# Patient Record
Sex: FEMALE | Race: WHITE | NOT HISPANIC OR LATINO
[De-identification: names, ages, dates, MRNs, and addresses within clinical notes are randomized per-mention and may not be internally consistent; named-entity substitution may affect disease eponyms.]

---

## 2017-02-17 ENCOUNTER — PRIOR ORIGINAL RECORDS (OUTPATIENT)
Dept: OTHER | Age: 82
End: 2017-02-17

## 2017-03-07 ENCOUNTER — PRIOR ORIGINAL RECORDS (OUTPATIENT)
Dept: OTHER | Age: 82
End: 2017-03-07

## 2017-03-10 ENCOUNTER — APPOINTMENT (OUTPATIENT)
Dept: LAB | Facility: HOSPITAL | Age: 82
End: 2017-03-10
Attending: INTERNAL MEDICINE
Payer: MEDICARE

## 2017-03-10 ENCOUNTER — PRIOR ORIGINAL RECORDS (OUTPATIENT)
Dept: OTHER | Age: 82
End: 2017-03-10

## 2017-03-10 DIAGNOSIS — I48.91 ATRIAL FIBRILLATION, UNSPECIFIED TYPE (HCC): ICD-10-CM

## 2017-03-10 LAB
ANION GAP SERPL CALC-SCNC: 8 MMOL/L (ref 0–18)
BUN SERPL-MCNC: 7 MG/DL (ref 8–20)
BUN/CREAT SERPL: 7.1 (ref 10–20)
CALCIUM SERPL-MCNC: 9.1 MG/DL (ref 8.5–10.5)
CHLORIDE SERPL-SCNC: 107 MMOL/L (ref 95–110)
CO2 SERPL-SCNC: 28 MMOL/L (ref 22–32)
CREAT SERPL-MCNC: 0.99 MG/DL (ref 0.5–1.5)
GLUCOSE SERPL-MCNC: 107 MG/DL (ref 70–99)
OSMOLALITY UR CALC.SUM OF ELEC: 294 MOSM/KG (ref 275–295)
POTASSIUM SERPL-SCNC: 4.2 MMOL/L (ref 3.3–5.1)
SODIUM SERPL-SCNC: 143 MMOL/L (ref 136–144)

## 2017-03-10 PROCEDURE — 80048 BASIC METABOLIC PNL TOTAL CA: CPT

## 2017-03-10 PROCEDURE — 36415 COLL VENOUS BLD VENIPUNCTURE: CPT

## 2017-03-13 LAB
BUN: 7 MG/DL
CALCIUM: 9.1 MG/DL
CHLORIDE: 107 MEQ/L
CREATININE, SERUM: 0.99 MG/DL
GLUCOSE: 107 MG/DL
POTASSIUM, SERUM: 4.2 MEQ/L
SODIUM: 143 MEQ/L

## 2017-03-17 RX ORDER — SODIUM CHLORIDE 9 MG/ML
INJECTION, SOLUTION INTRAVENOUS ONCE
Status: COMPLETED | OUTPATIENT
Start: 2017-03-20 | End: 2017-03-20

## 2017-03-17 RX ORDER — AMIODARONE HYDROCHLORIDE 200 MG/1
400 TABLET ORAL DAILY
COMMUNITY
End: 2017-07-04

## 2017-03-20 ENCOUNTER — HOSPITAL ENCOUNTER (OUTPATIENT)
Dept: INTERVENTIONAL RADIOLOGY/VASCULAR | Facility: HOSPITAL | Age: 82
Discharge: HOME | End: 2017-03-20
Attending: INTERNAL MEDICINE | Admitting: INTERNAL MEDICINE
Payer: MEDICARE

## 2017-03-20 VITALS
HEIGHT: 68 IN | HEART RATE: 52 BPM | OXYGEN SATURATION: 97 % | SYSTOLIC BLOOD PRESSURE: 147 MMHG | DIASTOLIC BLOOD PRESSURE: 77 MMHG | WEIGHT: 195 LBS | RESPIRATION RATE: 18 BRPM | BODY MASS INDEX: 29.55 KG/M2

## 2017-03-20 DIAGNOSIS — I48.91 A-FIB (HCC): ICD-10-CM

## 2017-03-20 PROCEDURE — 5A2204Z RESTORATION OF CARDIAC RHYTHM, SINGLE: ICD-10-PCS | Performed by: INTERNAL MEDICINE

## 2017-03-20 PROCEDURE — 93005 ELECTROCARDIOGRAM TRACING: CPT

## 2017-03-20 PROCEDURE — 93010 ELECTROCARDIOGRAM REPORT: CPT | Performed by: INTERNAL MEDICINE

## 2017-03-20 PROCEDURE — 92960 CARDIOVERSION ELECTRIC EXT: CPT

## 2017-03-20 RX ADMIN — SODIUM CHLORIDE: 9 INJECTION, SOLUTION INTRAVENOUS at 08:45:00

## 2017-03-20 NOTE — PROCEDURES
PROCEDURE(S) PERFORMED:    1. Cardioversion. 2.     Sedation     :  Marcelino Traylor MD    ANESTHESIA:  IV sedation. Moderate conscious sedation for this procedure was administered and personally monitored from 9:10a until 9:23.      INDICATION:  Pe

## 2017-03-27 ENCOUNTER — OFFICE VISIT (OUTPATIENT)
Dept: CARDIOLOGY CLINIC | Facility: HOSPITAL | Age: 82
End: 2017-03-27
Attending: INTERNAL MEDICINE
Payer: MEDICARE

## 2017-03-27 VITALS
WEIGHT: 193.31 LBS | OXYGEN SATURATION: 97 % | HEART RATE: 54 BPM | RESPIRATION RATE: 16 BRPM | SYSTOLIC BLOOD PRESSURE: 127 MMHG | DIASTOLIC BLOOD PRESSURE: 70 MMHG | BODY MASS INDEX: 29 KG/M2

## 2017-03-27 DIAGNOSIS — I48.19 PERSISTENT ATRIAL FIBRILLATION (HCC): ICD-10-CM

## 2017-03-27 DIAGNOSIS — I10 ESSENTIAL HYPERTENSION: ICD-10-CM

## 2017-03-27 DIAGNOSIS — Z79.899 ON AMIODARONE THERAPY: Chronic | ICD-10-CM

## 2017-03-27 DIAGNOSIS — I48.19 ATRIAL FIBRILLATION, PERSISTENT (HCC): ICD-10-CM

## 2017-03-27 DIAGNOSIS — I48.91 ATRIAL FIBRILLATION (HCC): Primary | ICD-10-CM

## 2017-03-27 PROCEDURE — 99214 OFFICE O/P EST MOD 30 MIN: CPT | Performed by: NURSE PRACTITIONER

## 2017-03-27 PROCEDURE — 93005 ELECTROCARDIOGRAM TRACING: CPT

## 2017-03-27 PROCEDURE — 99211 OFF/OP EST MAY X REQ PHY/QHP: CPT | Performed by: NURSE PRACTITIONER

## 2017-03-27 PROCEDURE — 93010 ELECTROCARDIOGRAM REPORT: CPT | Performed by: NURSE PRACTITIONER

## 2017-03-27 RX ORDER — CARVEDILOL 3.12 MG/1
3.12 TABLET ORAL 2 TIMES DAILY WITH MEALS
Qty: 60 TABLET | Refills: 2 | Status: SHIPPED | OUTPATIENT
Start: 2017-03-27 | End: 2017-09-06

## 2017-03-27 NOTE — PATIENT INSTRUCTIONS
Continue all your same medications except decrease carvedilol to 3.125 mg take 1 tablet twice daily (may cut the 6.25 mg tablet in half)    Call if having any dizziness, lightheadedness, heart racing, palpitations, chest pain, shortness of breath, coughing

## 2017-03-27 NOTE — PROGRESS NOTES
1740 [facility] Patient Status:  Outpatient    1934 MRN [id number]   [facility] Mcgee Gunnar is a 80year old female who presents to clini 143 03/10/2017 10:14 AM   K 4.2 03/10/2017 10:14 AM    03/10/2017 10:14 AM   CO2 28 03/10/2017 10:14 AM   * 03/10/2017 10:14 AM   CA 9.1 03/10/2017 10:14 AM   ALB 3.3* 10/04/2016 05:20 AM   ALKPHO 59 10/04/2016 05:20 AM   BILT 1.1 10/04/2016 0 amiodarone after recurrent Atrial fib on flecainide. There is post DC cardioversion on 3/20/2017 and continued on Xarelto 20 mg daily. EKG today notes sinus bradycardia heart rate 50 with stable QTC.       History of paroxysmal atrial fibrillation cardiov · Heart healthy, decreased refined sugars, and  2000 mg sodium restricted diet and maintain fluids at at about 64 ounces per day.  Common high sodium foods include frozen dinners, soups (not homemade), some cereal, vegetable juice, canned vegetables, janna

## 2017-04-10 ENCOUNTER — OFFICE VISIT (OUTPATIENT)
Dept: CARDIOLOGY CLINIC | Facility: HOSPITAL | Age: 82
End: 2017-04-10
Attending: INTERNAL MEDICINE
Payer: MEDICARE

## 2017-04-10 ENCOUNTER — PRIOR ORIGINAL RECORDS (OUTPATIENT)
Dept: OTHER | Age: 82
End: 2017-04-10

## 2017-04-10 VITALS
SYSTOLIC BLOOD PRESSURE: 117 MMHG | HEART RATE: 60 BPM | BODY MASS INDEX: 29 KG/M2 | RESPIRATION RATE: 16 BRPM | WEIGHT: 192.13 LBS | DIASTOLIC BLOOD PRESSURE: 77 MMHG | OXYGEN SATURATION: 98 %

## 2017-04-10 DIAGNOSIS — I95.1 ORTHOSTASIS: ICD-10-CM

## 2017-04-10 DIAGNOSIS — I10 ESSENTIAL HYPERTENSION: ICD-10-CM

## 2017-04-10 DIAGNOSIS — I48.0 PAROXYSMAL ATRIAL FIBRILLATION (HCC): ICD-10-CM

## 2017-04-10 DIAGNOSIS — I48.91 ATRIAL FIBRILLATION (HCC): Primary | ICD-10-CM

## 2017-04-10 DIAGNOSIS — R42 DIZZINESS: ICD-10-CM

## 2017-04-10 DIAGNOSIS — Z79.899 ON AMIODARONE THERAPY: Chronic | ICD-10-CM

## 2017-04-10 PROCEDURE — 36415 COLL VENOUS BLD VENIPUNCTURE: CPT | Performed by: NURSE PRACTITIONER

## 2017-04-10 PROCEDURE — 99214 OFFICE O/P EST MOD 30 MIN: CPT | Performed by: NURSE PRACTITIONER

## 2017-04-10 PROCEDURE — 93005 ELECTROCARDIOGRAM TRACING: CPT

## 2017-04-10 PROCEDURE — 99212 OFFICE O/P EST SF 10 MIN: CPT | Performed by: NURSE PRACTITIONER

## 2017-04-10 PROCEDURE — 93010 ELECTROCARDIOGRAM REPORT: CPT | Performed by: NURSE PRACTITIONER

## 2017-04-10 PROCEDURE — 84443 ASSAY THYROID STIM HORMONE: CPT | Performed by: NURSE PRACTITIONER

## 2017-04-10 PROCEDURE — 80053 COMPREHEN METABOLIC PANEL: CPT | Performed by: NURSE PRACTITIONER

## 2017-04-10 NOTE — PROGRESS NOTES
1740 [facility] Patient Status:  Outpatient    1934 MRN [id number]   Location MD Lashell Kingsley MD Adolph Blacksmith is a 80year old female who presents to clini HGB 14.1 10/19/2016 08:58 AM   HCT 41.6 10/19/2016 08:58 AM    10/19/2016 08:58 AM   CREATSERUM 0.99 03/10/2017 10:14 AM   BUN 7* 03/10/2017 10:14 AM    03/10/2017 10:14 AM   K 4.2 03/10/2017 10:14 AM    03/10/2017 10:14 AM   CO2 28 03 purpose of clinic visits, sodium restricted diet, low sodium foods, fluid restrictions, daily weights, medication regimen s/s atrial fibrillation exacerbation and when to call APN/clinic. Patient receptive.     Assessment:  Persistent recurrent atrial fibri for total of 3 months then decrease to 200 mg daily if she is remaining in sinus rhythm. Consider stopping carvedilol but reluctant due to history of recurrent A. fib.   Follow-up in the atrial fibrillation clinic on April 24 and follow up with Dr. Lexx Dsouza o

## 2017-04-26 ENCOUNTER — OFFICE VISIT (OUTPATIENT)
Dept: CARDIOLOGY CLINIC | Facility: HOSPITAL | Age: 82
End: 2017-04-26
Attending: INTERNAL MEDICINE
Payer: MEDICARE

## 2017-04-26 VITALS
DIASTOLIC BLOOD PRESSURE: 72 MMHG | BODY MASS INDEX: 30 KG/M2 | RESPIRATION RATE: 16 BRPM | HEART RATE: 59 BPM | SYSTOLIC BLOOD PRESSURE: 144 MMHG | OXYGEN SATURATION: 99 % | WEIGHT: 195.31 LBS

## 2017-04-26 DIAGNOSIS — I48.0 PAROXYSMAL ATRIAL FIBRILLATION (HCC): Primary | ICD-10-CM

## 2017-04-26 DIAGNOSIS — Z79.899 ON AMIODARONE THERAPY: Chronic | ICD-10-CM

## 2017-04-26 DIAGNOSIS — I10 ESSENTIAL HYPERTENSION: ICD-10-CM

## 2017-04-26 DIAGNOSIS — R42 DIZZINESS: ICD-10-CM

## 2017-04-26 PROCEDURE — 99213 OFFICE O/P EST LOW 20 MIN: CPT | Performed by: NURSE PRACTITIONER

## 2017-04-26 PROCEDURE — 99211 OFF/OP EST MAY X REQ PHY/QHP: CPT | Performed by: NURSE PRACTITIONER

## 2017-04-26 NOTE — PROGRESS NOTES
1740 [facility] Patient Status:  Outpatient    1934 MRN [id number]   Location MD Leno Shannon MD Tully Macadam is a 80year old female who presents to clini negative for muscle weakness and myalgias    Objective:    Lab Results  Component Value Date/Time   WBC 4.0 10/19/2016 08:58 AM   HGB 14.1 10/19/2016 08:58 AM   HCT 41.6 10/19/2016 08:58 AM    10/19/2016 08:58 AM   CREATSERUM 0.95 04/10/2017 01:36 P changes    Education:  Patient  instructed at length regarding clinic procedures, hours, purpose of clinic visits, sodium restricted diet, low sodium foods, fluid restrictions, daily weights, medication regimen s/s atrial fibrillation exacerbation and when worsening symptoms. Weigh yourself daily in the morning before breakfast, call if gaining 3 lbs or more overnight or more than 5 lbs in one week.     Follow up with Dr. Odell Houser on May 30 th as scheduled    Follow up with the atrial fibrillation clinic as

## 2017-05-30 ENCOUNTER — LAB ENCOUNTER (OUTPATIENT)
Dept: LAB | Facility: HOSPITAL | Age: 82
End: 2017-05-30
Attending: INTERNAL MEDICINE
Payer: MEDICARE

## 2017-05-30 ENCOUNTER — PRIOR ORIGINAL RECORDS (OUTPATIENT)
Dept: OTHER | Age: 82
End: 2017-05-30

## 2017-05-30 DIAGNOSIS — I48.91 A-FIB (HCC): Primary | ICD-10-CM

## 2017-05-30 LAB
ALBUMIN: 3.6 G/DL
ALKALINE PHOSPHATATE(ALK PHOS): 60 IU/L
ALT (SGPT): 31 U/L
AST (SGOT): 24 U/L
BILIRUBIN TOTAL: 0.9 MG/DL
BUN: 9 MG/DL
CALCIUM: 9.1 MG/DL
CHLORIDE: 106 MEQ/L
CREATININE, SERUM: 0.95 MG/DL
GLOBULIN: 2.8 G/DL
GLUCOSE: 89 MG/DL
GLUCOSE: 89 MG/DL
POTASSIUM, SERUM: 4.1 MEQ/L
PROTEIN, TOTAL: 6.4 G/DL
SGOT (AST): 24 IU/L
SGPT (ALT): 31 IU/L
SODIUM: 141 MEQ/L
THYROID STIMULATING HORMONE: 5.03 MLU/L

## 2017-05-30 PROCEDURE — 80053 COMPREHEN METABOLIC PANEL: CPT

## 2017-05-30 PROCEDURE — 84443 ASSAY THYROID STIM HORMONE: CPT

## 2017-05-30 PROCEDURE — 36415 COLL VENOUS BLD VENIPUNCTURE: CPT

## 2017-05-31 ENCOUNTER — PRIOR ORIGINAL RECORDS (OUTPATIENT)
Dept: OTHER | Age: 82
End: 2017-05-31

## 2017-05-31 LAB
ALBUMIN: 4 G/DL
ALKALINE PHOSPHATATE(ALK PHOS): 65 IU/L
ALT (SGPT): 27 U/L
AST (SGOT): 29 U/L
BILIRUBIN TOTAL: 1.1 MG/DL
BUN: 10 MG/DL
CALCIUM: 9.6 MG/DL
CHLORIDE: 108 MEQ/L
CREATININE, SERUM: 1.16 MG/DL
GLOBULIN: 2.5 G/DL
GLUCOSE: 94 MG/DL
POTASSIUM, SERUM: 4 MEQ/L
PROTEIN, TOTAL: 6.5 G/DL
SGOT (AST): 29 IU/L
SGPT (ALT): 27 IU/L
SODIUM: 144 MEQ/L
THYROID STIMULATING HORMONE: 4.94 MLU/L

## 2017-06-20 ENCOUNTER — OFFICE VISIT (OUTPATIENT)
Dept: INTERNAL MEDICINE CLINIC | Facility: CLINIC | Age: 82
End: 2017-06-20

## 2017-06-20 ENCOUNTER — PRIOR ORIGINAL RECORDS (OUTPATIENT)
Dept: OTHER | Age: 82
End: 2017-06-20

## 2017-06-20 ENCOUNTER — APPOINTMENT (OUTPATIENT)
Dept: LAB | Facility: HOSPITAL | Age: 82
End: 2017-06-20
Attending: INTERNAL MEDICINE
Payer: MEDICARE

## 2017-06-20 VITALS
HEART RATE: 60 BPM | HEIGHT: 66 IN | DIASTOLIC BLOOD PRESSURE: 72 MMHG | WEIGHT: 187 LBS | TEMPERATURE: 98 F | OXYGEN SATURATION: 95 % | BODY MASS INDEX: 30.05 KG/M2 | SYSTOLIC BLOOD PRESSURE: 124 MMHG

## 2017-06-20 DIAGNOSIS — E55.9 VITAMIN D DEFICIENCY: ICD-10-CM

## 2017-06-20 DIAGNOSIS — G47.9 SLEEP DISTURBANCE: ICD-10-CM

## 2017-06-20 DIAGNOSIS — E03.9 HYPOTHYROIDISM, UNSPECIFIED TYPE: ICD-10-CM

## 2017-06-20 DIAGNOSIS — I48.0 PAROXYSMAL ATRIAL FIBRILLATION (HCC): ICD-10-CM

## 2017-06-20 DIAGNOSIS — I10 ESSENTIAL HYPERTENSION: ICD-10-CM

## 2017-06-20 DIAGNOSIS — Z91.81 AT RISK FOR FALLING: ICD-10-CM

## 2017-06-20 DIAGNOSIS — I10 ESSENTIAL HYPERTENSION: Primary | ICD-10-CM

## 2017-06-20 DIAGNOSIS — R53.83 FATIGUE, UNSPECIFIED TYPE: ICD-10-CM

## 2017-06-20 DIAGNOSIS — E78.5 HYPERLIPIDEMIA, UNSPECIFIED HYPERLIPIDEMIA TYPE: ICD-10-CM

## 2017-06-20 PROCEDURE — 36415 COLL VENOUS BLD VENIPUNCTURE: CPT

## 2017-06-20 PROCEDURE — 80053 COMPREHEN METABOLIC PANEL: CPT

## 2017-06-20 PROCEDURE — 84443 ASSAY THYROID STIM HORMONE: CPT

## 2017-06-20 PROCEDURE — 82306 VITAMIN D 25 HYDROXY: CPT

## 2017-06-20 PROCEDURE — G0463 HOSPITAL OUTPT CLINIC VISIT: HCPCS | Performed by: INTERNAL MEDICINE

## 2017-06-20 PROCEDURE — 99215 OFFICE O/P EST HI 40 MIN: CPT | Performed by: INTERNAL MEDICINE

## 2017-06-20 PROCEDURE — 93005 ELECTROCARDIOGRAM TRACING: CPT | Performed by: INTERNAL MEDICINE

## 2017-06-20 PROCEDURE — 84439 ASSAY OF FREE THYROXINE: CPT

## 2017-06-20 PROCEDURE — 81001 URINALYSIS AUTO W/SCOPE: CPT

## 2017-06-20 PROCEDURE — 93010 ELECTROCARDIOGRAM REPORT: CPT | Performed by: INTERNAL MEDICINE

## 2017-06-20 PROCEDURE — 85027 COMPLETE CBC AUTOMATED: CPT

## 2017-06-20 RX ORDER — LEVOTHYROXINE SODIUM 0.03 MG/1
25 TABLET ORAL
Qty: 30 TABLET | Refills: 2 | Status: SHIPPED | OUTPATIENT
Start: 2017-06-20 | End: 2017-09-05

## 2017-06-24 NOTE — PATIENT INSTRUCTIONS
Healthy Diet and Regular Exercise  [employer] [facility] [address] for making healthy food choices  -   Enjoy your food, but eat less. Fully enjoy your food when eating. Don’t eat while distracted and slow down. Avoid over sized portions.    Do a night light between the bedroom and bathroom. ? Get up slowly from lying down or sitting if you get dizzy. ? Keep a working flashlight near your bed. STAIRS:  ? Keep stairwells well lit with light switches at top and bottom. ?  Install sturdy handrail

## 2017-06-24 NOTE — PROGRESS NOTES
HPI:    Patient ID: Jessenia Carballo is a 80year old female.     HPI    Presents with worsening fatigue  Weakness  And lightleaded  Lives alone   Mostly rest during the day cannot garden or do her usual  Chores    /72   Pulse 60   Temp 98.1 °F (36.7 ° months then decrease to 200 mg daily if she is remaining in sinus rhythm. Follow-up with Dr. Ashok Banks on May 30 as scheduled.   Up with the atrial fibrillation clinic as needed or as directed     Plan:  Continue all your same medications     Call if having an Tartrate (AMBIEN) 5 MG Oral Tab Take 1 tablet (5 mg total) by mouth nightly as needed for Sleep. Disp: 60 tablet Rfl: 0   Rosuvastatin Calcium (CRESTOR) 20 MG Oral Tab Take 1 tablet (20 mg total) by mouth once daily.  Dispense generic only Disp: 90 tablet R heard.  Pulmonary/Chest: Effort normal and breath sounds normal. No respiratory distress. She has no wheezes. She has no rales. She exhibits no tenderness. Abdominal: She exhibits no distension (obese) and no mass. There is no tenderness.    Musculoskelet Increase acativit B{ monitor  Supplement and side effect of med  Fall precaution  Patient voiced understanding  and agrees with plan  .           Orders Placed This Encounter      Assay, Thyroid Stim Hormone      Free T4, (Free Thyroxine)      CBC, Platelet

## 2017-09-06 RX ORDER — LEVOTHYROXINE SODIUM 0.03 MG/1
TABLET ORAL
Qty: 90 TABLET | Refills: 3 | Status: SHIPPED | OUTPATIENT
Start: 2017-09-06 | End: 2018-10-13

## 2017-09-06 RX ORDER — CARVEDILOL 3.12 MG/1
1.56 TABLET ORAL 2 TIMES DAILY WITH MEALS
Qty: 90 TABLET | Refills: 0 | Status: SHIPPED | OUTPATIENT
Start: 2017-09-06 | End: 2019-02-07

## 2017-09-18 LAB
ALBUMIN: 4 G/DL
BILIRUBIN TOTAL: 1 MG/DL
BUN: 14 MG/DL
CALCIUM: 9.2 MG/DL
CHLORIDE: 105 MEQ/L
CREATININE, SERUM: 0.99 MG/DL
FREE T4: 1.13 MG/DL
GLOBULIN: 2.4 G/DL
GLUCOSE: 91 MG/DL
HEMATOCRIT: 41.3 %
HEMOGLOBIN: 13.8 G/DL
MCH: 33.1 PG
MCHC: 33.4 G/DL
MCV: 98.9 FL
PLATELETS: 181 K/UL
POTASSIUM, SERUM: 4.7 MEQ/L
PROTEIN, TOTAL: 6.4 G/DL
RED BLOOD COUNT: 4.17 X 10-6/U
SGOT (AST): 27 IU/L
SGPT (ALT): 30 IU/L
SODIUM: 140 MEQ/L
VITAMIN D 25-OH: 29.3 NG/ML
WHITE BLOOD COUNT: 4.9 X 10-3/U

## 2017-09-19 ENCOUNTER — PRIOR ORIGINAL RECORDS (OUTPATIENT)
Dept: OTHER | Age: 82
End: 2017-09-19

## 2017-09-22 ENCOUNTER — OFFICE VISIT (OUTPATIENT)
Dept: INTERNAL MEDICINE CLINIC | Facility: CLINIC | Age: 82
End: 2017-09-22

## 2017-09-22 VITALS
HEIGHT: 67 IN | TEMPERATURE: 97 F | BODY MASS INDEX: 28.56 KG/M2 | SYSTOLIC BLOOD PRESSURE: 109 MMHG | HEART RATE: 61 BPM | DIASTOLIC BLOOD PRESSURE: 65 MMHG | WEIGHT: 182 LBS

## 2017-09-22 DIAGNOSIS — I10 ESSENTIAL HYPERTENSION: ICD-10-CM

## 2017-09-22 DIAGNOSIS — R53.83 FATIGUE, UNSPECIFIED TYPE: ICD-10-CM

## 2017-09-22 DIAGNOSIS — G47.10 HYPERSOMNIA: ICD-10-CM

## 2017-09-22 DIAGNOSIS — R42 DIZZINESS: ICD-10-CM

## 2017-09-22 DIAGNOSIS — R29.818 SUSPECTED SLEEP APNEA: ICD-10-CM

## 2017-09-22 DIAGNOSIS — I48.20 CHRONIC ATRIAL FIBRILLATION (HCC): ICD-10-CM

## 2017-09-22 DIAGNOSIS — I48.0 PAROXYSMAL ATRIAL FIBRILLATION (HCC): Primary | ICD-10-CM

## 2017-09-22 DIAGNOSIS — E03.9 HYPOTHYROIDISM, UNSPECIFIED TYPE: ICD-10-CM

## 2017-09-22 DIAGNOSIS — E55.9 VITAMIN D DEFICIENCY: ICD-10-CM

## 2017-09-22 PROCEDURE — 93005 ELECTROCARDIOGRAM TRACING: CPT | Performed by: INTERNAL MEDICINE

## 2017-09-22 PROCEDURE — 93010 ELECTROCARDIOGRAM REPORT: CPT | Performed by: INTERNAL MEDICINE

## 2017-09-22 PROCEDURE — G0463 HOSPITAL OUTPT CLINIC VISIT: HCPCS | Performed by: INTERNAL MEDICINE

## 2017-09-22 PROCEDURE — 99214 OFFICE O/P EST MOD 30 MIN: CPT | Performed by: INTERNAL MEDICINE

## 2017-09-22 NOTE — TELEPHONE ENCOUNTER
Vit D refill request     Ref Range & Units 6/20/17  6:13 PM   Vitamin D, 25OH, Total ng/mL 29.3      Refill Protocol Appointment Criteria  · Appointment scheduled in the past 6 months or in the next 3 months  Recent Outpatient Visits            3 months ag

## 2017-09-25 NOTE — PROGRESS NOTES
HPI:    Patient ID: Forest Elizondo is a 80year old female.     HPI    Came in bec she is feeling very fatigued  Saw [facility]  Was to follow up in a few months    /65 (BP Location: Left arm, Patient Position: Sitting)   Pulse 61   Temp (!) 97.4 °F Take 2 tablets (400 mg total) by mouth daily. Disp: 180 tablet Rfl: 3   Rivaroxaban (XARELTO) 20 MG Oral Tab Take 1 tablet (20 mg total) by mouth daily.  Disp: 30 tablet Rfl: 5   Zolpidem Tartrate (AMBIEN) 5 MG Oral Tab Take 1 tablet (5 mg total) by mouth n distal pulses. Bradycardia present. Exam reveals no gallop. No murmur heard. Pulmonary/Chest: Effort normal and breath sounds normal. No respiratory distress. She has no wheezes. She has no rales. She exhibits no tenderness. Abdominal: Soft.  Bowel [id number]

## 2017-09-26 ENCOUNTER — APPOINTMENT (OUTPATIENT)
Dept: LAB | Facility: HOSPITAL | Age: 82
End: 2017-09-26
Attending: INTERNAL MEDICINE
Payer: MEDICARE

## 2017-09-26 ENCOUNTER — PRIOR ORIGINAL RECORDS (OUTPATIENT)
Dept: OTHER | Age: 82
End: 2017-09-26

## 2017-09-26 DIAGNOSIS — E55.9 VITAMIN D DEFICIENCY: ICD-10-CM

## 2017-09-26 DIAGNOSIS — R42 DIZZINESS: ICD-10-CM

## 2017-09-26 DIAGNOSIS — I10 ESSENTIAL HYPERTENSION: ICD-10-CM

## 2017-09-26 DIAGNOSIS — E03.9 HYPOTHYROIDISM, UNSPECIFIED TYPE: ICD-10-CM

## 2017-09-26 LAB
ALBUMIN SERPL BCP-MCNC: 3.7 G/DL (ref 3.5–4.8)
ALBUMIN/GLOB SERPL: 1.6 {RATIO} (ref 1–2)
ALP SERPL-CCNC: 56 U/L (ref 32–100)
ALT SERPL-CCNC: 37 U/L (ref 14–54)
ANION GAP SERPL CALC-SCNC: 7 MMOL/L (ref 0–18)
AST SERPL-CCNC: 34 U/L (ref 15–41)
BACTERIA UR QL AUTO: NEGATIVE /HPF
BILIRUB SERPL-MCNC: 1.1 MG/DL (ref 0.3–1.2)
BUN SERPL-MCNC: 9 MG/DL (ref 8–20)
BUN/CREAT SERPL: 8.2 (ref 10–20)
CALCIUM SERPL-MCNC: 8.8 MG/DL (ref 8.5–10.5)
CHLORIDE SERPL-SCNC: 107 MMOL/L (ref 95–110)
CO2 SERPL-SCNC: 26 MMOL/L (ref 22–32)
CREAT SERPL-MCNC: 1.1 MG/DL (ref 0.5–1.5)
ERYTHROCYTE [DISTWIDTH] IN BLOOD BY AUTOMATED COUNT: 14.3 % (ref 11–15)
FOLATE SERPL-MCNC: 9.2 NG/ML
GLOBULIN PLAS-MCNC: 2.3 G/DL (ref 2.5–3.7)
GLUCOSE SERPL-MCNC: 161 MG/DL (ref 70–99)
HCT VFR BLD AUTO: 40.1 % (ref 35–48)
HGB BLD-MCNC: 13.6 G/DL (ref 12–16)
MCH RBC QN AUTO: 33.8 PG (ref 27–32)
MCHC RBC AUTO-ENTMCNC: 34 G/DL (ref 32–37)
MCV RBC AUTO: 99.4 FL (ref 80–100)
OSMOLALITY UR CALC.SUM OF ELEC: 292 MOSM/KG (ref 275–295)
PLATELET # BLD AUTO: 154 K/UL (ref 140–400)
PMV BLD AUTO: 9.2 FL (ref 7.4–10.3)
POTASSIUM SERPL-SCNC: 3.7 MMOL/L (ref 3.3–5.1)
PROT SERPL-MCNC: 6 G/DL (ref 5.9–8.4)
RBC # BLD AUTO: 4.04 M/UL (ref 3.7–5.4)
RBC #/AREA URNS AUTO: 2 /HPF
SODIUM SERPL-SCNC: 140 MMOL/L (ref 136–144)
T4 FREE SERPL-MCNC: 1.39 NG/DL (ref 0.58–1.64)
TSH SERPL-ACNC: 3.3 UIU/ML (ref 0.45–5.33)
VIT B12 SERPL-MCNC: 668 PG/ML (ref 181–914)
WBC # BLD AUTO: 3.6 K/UL (ref 4–11)
WBC #/AREA URNS AUTO: 1 /HPF

## 2017-09-26 PROCEDURE — 82607 VITAMIN B-12: CPT

## 2017-09-26 PROCEDURE — 81015 MICROSCOPIC EXAM OF URINE: CPT

## 2017-09-26 PROCEDURE — 85027 COMPLETE CBC AUTOMATED: CPT

## 2017-09-26 PROCEDURE — 36415 COLL VENOUS BLD VENIPUNCTURE: CPT

## 2017-09-26 PROCEDURE — 84439 ASSAY OF FREE THYROXINE: CPT

## 2017-09-26 PROCEDURE — 82746 ASSAY OF FOLIC ACID SERUM: CPT

## 2017-09-26 PROCEDURE — 84443 ASSAY THYROID STIM HORMONE: CPT

## 2017-09-26 PROCEDURE — 82306 VITAMIN D 25 HYDROXY: CPT

## 2017-09-26 PROCEDURE — 80053 COMPREHEN METABOLIC PANEL: CPT

## 2017-09-27 LAB — 25(OH)D3 SERPL-MCNC: 38.5 NG/ML

## 2017-09-28 ENCOUNTER — TELEPHONE (OUTPATIENT)
Dept: INTERNAL MEDICINE CLINIC | Facility: CLINIC | Age: 82
End: 2017-09-28

## 2017-09-28 RX ORDER — ERGOCALCIFEROL 1.25 MG/1
CAPSULE ORAL
Qty: 3 CAPSULE | Refills: 0 | OUTPATIENT
Start: 2017-09-28

## 2017-09-28 RX ORDER — ERGOCALCIFEROL 1.25 MG/1
50000 CAPSULE ORAL
Qty: 6 CAPSULE | Refills: 0 | Status: SHIPPED | OUTPATIENT
Start: 2017-09-28 | End: 2018-02-15

## 2017-09-28 NOTE — TELEPHONE ENCOUNTER
Per pt spoke to pt this morning. Pt states Dr. Gloria Velázquez advise her to make appt to see her. Appt made for Tues. 10/3/17   Informed pt Dr. Gloria Velázquez will like her to make appt for sleep study. Order place. Phone number given. Verbalized understanding.   Pt stat

## 2017-09-28 NOTE — TELEPHONE ENCOUNTER
Call patient   I called her and told her that  I ordered a sleep study  She did not have the order and wanted phone number and more information re sleep study  Can you call her and notify her of the order  And give her he information on how to make an appt

## 2017-09-29 ENCOUNTER — PRIOR ORIGINAL RECORDS (OUTPATIENT)
Dept: OTHER | Age: 82
End: 2017-09-29

## 2017-09-29 RX ORDER — ZOLPIDEM TARTRATE 5 MG/1
TABLET ORAL
Qty: 60 TABLET | Refills: 0 | OUTPATIENT
Start: 2017-09-29 | End: 2017-11-30

## 2017-10-03 ENCOUNTER — OFFICE VISIT (OUTPATIENT)
Dept: INTERNAL MEDICINE CLINIC | Facility: CLINIC | Age: 82
End: 2017-10-03

## 2017-10-03 VITALS
HEIGHT: 68 IN | BODY MASS INDEX: 27.43 KG/M2 | DIASTOLIC BLOOD PRESSURE: 75 MMHG | HEART RATE: 57 BPM | SYSTOLIC BLOOD PRESSURE: 130 MMHG | TEMPERATURE: 98 F | WEIGHT: 181 LBS

## 2017-10-03 DIAGNOSIS — E03.9 HYPOTHYROIDISM, UNSPECIFIED TYPE: ICD-10-CM

## 2017-10-03 DIAGNOSIS — E55.9 VITAMIN D DEFICIENCY: ICD-10-CM

## 2017-10-03 DIAGNOSIS — I25.10 CORONARY ARTERY DISEASE INVOLVING NATIVE CORONARY ARTERY OF NATIVE HEART WITHOUT ANGINA PECTORIS: ICD-10-CM

## 2017-10-03 DIAGNOSIS — R42 DIZZINESS: ICD-10-CM

## 2017-10-03 DIAGNOSIS — I48.0 PAROXYSMAL ATRIAL FIBRILLATION (HCC): Primary | ICD-10-CM

## 2017-10-03 PROCEDURE — G0463 HOSPITAL OUTPT CLINIC VISIT: HCPCS | Performed by: INTERNAL MEDICINE

## 2017-10-03 PROCEDURE — 99214 OFFICE O/P EST MOD 30 MIN: CPT | Performed by: INTERNAL MEDICINE

## 2017-10-03 PROCEDURE — G0008 ADMIN INFLUENZA VIRUS VAC: HCPCS | Performed by: INTERNAL MEDICINE

## 2017-10-03 PROCEDURE — G0009 ADMIN PNEUMOCOCCAL VACCINE: HCPCS | Performed by: INTERNAL MEDICINE

## 2017-10-03 PROCEDURE — 90670 PCV13 VACCINE IM: CPT | Performed by: INTERNAL MEDICINE

## 2017-10-03 PROCEDURE — 90653 IIV ADJUVANT VACCINE IM: CPT | Performed by: INTERNAL MEDICINE

## 2017-10-18 NOTE — PROGRESS NOTES
HPI:    Patient ID: Ofelia Bonner is a 80year old female.     HPI    Feeling dizzy weak and fatigue  Hx of paroxysmal atrial fib  Switched form tambocor to amiodorone  Pt with bradycardia stable  Was recently seen by Dr Octavio Ricardo    Pt unsure if she sno ergocalciferol 92780 units Oral Cap Take 1 capsule (50,000 Units total) by mouth every 14 (fourteen) days.  Disp: 6 capsule Rfl: 0   LEVOTHYROXINE SODIUM 25 MCG Oral Tab TAKE 1 TABLET(25 MCG) BY MOUTH BEFORE BREAKFAST Disp: 90 tablet Rfl: 3   carvedilol 3 moist. No oropharyngeal exudate. Eyes: Conjunctivae and EOM are normal. Pupils are equal, round, and reactive to light. Right eye exhibits no discharge. Left eye exhibits no discharge. No scleral icterus. Neck: Neck supple. No thyromegaly present.    Ca

## 2017-10-25 ENCOUNTER — MED REC SCAN ONLY (OUTPATIENT)
Dept: INTERNAL MEDICINE CLINIC | Facility: CLINIC | Age: 82
End: 2017-10-25

## 2017-11-18 RX ORDER — ROSUVASTATIN CALCIUM 20 MG/1
TABLET, COATED ORAL
Qty: 90 TABLET | Refills: 3 | Status: SHIPPED | OUTPATIENT
Start: 2017-11-18 | End: 2018-12-06

## 2017-11-18 RX ORDER — RIVAROXABAN 20 MG/1
TABLET, FILM COATED ORAL
Qty: 90 TABLET | Refills: 3 | Status: SHIPPED | OUTPATIENT
Start: 2017-11-18 | End: 2018-12-06

## 2017-12-02 RX ORDER — ZOLPIDEM TARTRATE 5 MG/1
TABLET ORAL
Qty: 60 TABLET | Refills: 0 | OUTPATIENT
Start: 2017-12-02 | End: 2017-12-09

## 2017-12-10 RX ORDER — ZOLPIDEM TARTRATE 5 MG/1
TABLET ORAL
Qty: 60 TABLET | Refills: 1 | OUTPATIENT
Start: 2017-12-10 | End: 2018-02-15

## 2017-12-17 ENCOUNTER — TELEPHONE (OUTPATIENT)
Dept: INTERNAL MEDICINE CLINIC | Facility: CLINIC | Age: 82
End: 2017-12-17

## 2018-01-04 ENCOUNTER — TELEPHONE (OUTPATIENT)
Dept: INTERNAL MEDICINE CLINIC | Facility: CLINIC | Age: 83
End: 2018-01-04

## 2018-02-16 RX ORDER — ERGOCALCIFEROL 1.25 MG/1
CAPSULE ORAL
Qty: 6 CAPSULE | Refills: 0 | Status: SHIPPED | OUTPATIENT
Start: 2018-02-16 | End: 2018-07-27

## 2018-02-16 RX ORDER — ZOLPIDEM TARTRATE 5 MG/1
TABLET ORAL
Qty: 60 TABLET | Refills: 0 | OUTPATIENT
Start: 2018-02-16

## 2018-03-12 LAB
ALBUMIN: 3.7 G/DL
ALT (SGPT): 37 U/L
AST (SGOT): 34 U/L
BILIRUBIN TOTAL: 1.1 MG/DL
BUN: 9 MG/DL
CALCIUM: 8.8 MG/DL
CHLORIDE: 107 MEQ/L
CREATININE, SERUM: 1.1 MG/DL
FREE T4: 1.39 MG/DL
GLOBULIN: 2.3 G/DL
GLUCOSE: 161 MG/DL
GLUCOSE: 161 MG/DL
HEMATOCRIT: 40.1 %
HEMOGLOBIN: 13.6 G/DL
PLATELETS: 154 K/UL
POTASSIUM, SERUM: 3.7 MEQ/L
PROTEIN, TOTAL: 6 G/DL
RED BLOOD COUNT: 4.04 X 10-6/U
SGOT (AST): 34 IU/L
SGPT (ALT): 37 IU/L
SODIUM: 140 MEQ/L
THYROID STIMULATING HORMONE: 3.3 MLU/L
VITAMIN D 25-OH: 38.5 NG/ML
WHITE BLOOD COUNT: 3.6 X 10-3/U

## 2018-03-16 ENCOUNTER — PRIOR ORIGINAL RECORDS (OUTPATIENT)
Dept: OTHER | Age: 83
End: 2018-03-16

## 2018-03-16 ENCOUNTER — LAB ENCOUNTER (OUTPATIENT)
Dept: LAB | Facility: HOSPITAL | Age: 83
End: 2018-03-16
Attending: INTERNAL MEDICINE
Payer: MEDICARE

## 2018-03-16 DIAGNOSIS — I48.91 A-FIB (HCC): ICD-10-CM

## 2018-03-16 DIAGNOSIS — Z79.899 NEED FOR PROPHYLACTIC CHEMOTHERAPY: Primary | ICD-10-CM

## 2018-03-16 LAB
ALBUMIN SERPL BCP-MCNC: 3.8 G/DL (ref 3.5–4.8)
ALBUMIN/GLOB SERPL: 1.4 {RATIO} (ref 1–2)
ALP SERPL-CCNC: 66 U/L (ref 32–100)
ALT SERPL-CCNC: 36 U/L (ref 14–54)
ANION GAP SERPL CALC-SCNC: 6 MMOL/L (ref 0–18)
AST SERPL-CCNC: 32 U/L (ref 15–41)
BILIRUB SERPL-MCNC: 1 MG/DL (ref 0.3–1.2)
BUN SERPL-MCNC: 9 MG/DL (ref 8–20)
BUN/CREAT SERPL: 9.1 (ref 10–20)
CALCIUM SERPL-MCNC: 9.2 MG/DL (ref 8.5–10.5)
CHLORIDE SERPL-SCNC: 105 MMOL/L (ref 95–110)
CO2 SERPL-SCNC: 29 MMOL/L (ref 22–32)
CREAT SERPL-MCNC: 0.99 MG/DL (ref 0.5–1.5)
GLOBULIN PLAS-MCNC: 2.7 G/DL (ref 2.5–3.7)
GLUCOSE SERPL-MCNC: 116 MG/DL (ref 70–99)
OSMOLALITY UR CALC.SUM OF ELEC: 290 MOSM/KG (ref 275–295)
PATIENT FASTING: NO
POTASSIUM SERPL-SCNC: 3.7 MMOL/L (ref 3.3–5.1)
PROT SERPL-MCNC: 6.5 G/DL (ref 5.9–8.4)
SODIUM SERPL-SCNC: 140 MMOL/L (ref 136–144)
TSH SERPL-ACNC: 3.66 UIU/ML (ref 0.45–5.33)

## 2018-03-16 PROCEDURE — 80053 COMPREHEN METABOLIC PANEL: CPT

## 2018-03-16 PROCEDURE — 36415 COLL VENOUS BLD VENIPUNCTURE: CPT

## 2018-03-16 PROCEDURE — 84443 ASSAY THYROID STIM HORMONE: CPT

## 2018-03-19 LAB
ALBUMIN: 3.8 G/DL
ALKALINE PHOSPHATATE(ALK PHOS): 66 IU/L
ALT (SGPT): 36 U/L
AST (SGOT): 32 U/L
BILIRUBIN TOTAL: 1 MG/DL
BUN: 9 MG/DL
CALCIUM: 9.2 MG/DL
CHLORIDE: 105 MEQ/L
CREATININE, SERUM: 0.99 MG/DL
GLOBULIN: 2.7 G/DL
GLUCOSE: 116 MG/DL
POTASSIUM, SERUM: 3.7 MEQ/L
PROTEIN, TOTAL: 6.5 G/DL
SGOT (AST): 32 IU/L
SGPT (ALT): 36 IU/L
SODIUM: 140 MEQ/L
THYROID STIMULATING HORMONE: 3.66 MLU/L

## 2018-03-20 ENCOUNTER — PRIOR ORIGINAL RECORDS (OUTPATIENT)
Dept: OTHER | Age: 83
End: 2018-03-20

## 2018-04-17 ENCOUNTER — OFFICE VISIT (OUTPATIENT)
Dept: INTERNAL MEDICINE CLINIC | Facility: CLINIC | Age: 83
End: 2018-04-17

## 2018-04-17 VITALS
DIASTOLIC BLOOD PRESSURE: 80 MMHG | HEART RATE: 66 BPM | BODY MASS INDEX: 26.52 KG/M2 | SYSTOLIC BLOOD PRESSURE: 124 MMHG | WEIGHT: 175 LBS | TEMPERATURE: 97 F | HEIGHT: 68 IN

## 2018-04-17 DIAGNOSIS — R73.9 HYPERGLYCEMIA: ICD-10-CM

## 2018-04-17 DIAGNOSIS — E78.5 HYPERLIPIDEMIA, UNSPECIFIED HYPERLIPIDEMIA TYPE: ICD-10-CM

## 2018-04-17 DIAGNOSIS — Z12.31 VISIT FOR SCREENING MAMMOGRAM: ICD-10-CM

## 2018-04-17 DIAGNOSIS — I10 ESSENTIAL HYPERTENSION: Primary | ICD-10-CM

## 2018-04-17 PROCEDURE — G0463 HOSPITAL OUTPT CLINIC VISIT: HCPCS | Performed by: INTERNAL MEDICINE

## 2018-04-17 PROCEDURE — 99214 OFFICE O/P EST MOD 30 MIN: CPT | Performed by: INTERNAL MEDICINE

## 2018-04-18 ENCOUNTER — PRIOR ORIGINAL RECORDS (OUTPATIENT)
Dept: OTHER | Age: 83
End: 2018-04-18

## 2018-04-18 ENCOUNTER — APPOINTMENT (OUTPATIENT)
Dept: LAB | Facility: HOSPITAL | Age: 83
End: 2018-04-18
Attending: INTERNAL MEDICINE
Payer: MEDICARE

## 2018-04-18 DIAGNOSIS — R73.9 HYPERGLYCEMIA: ICD-10-CM

## 2018-04-18 DIAGNOSIS — E78.5 HYPERLIPIDEMIA, UNSPECIFIED HYPERLIPIDEMIA TYPE: ICD-10-CM

## 2018-04-18 PROCEDURE — 84460 ALANINE AMINO (ALT) (SGPT): CPT

## 2018-04-18 PROCEDURE — 36415 COLL VENOUS BLD VENIPUNCTURE: CPT

## 2018-04-18 PROCEDURE — 80061 LIPID PANEL: CPT

## 2018-04-18 PROCEDURE — 84450 TRANSFERASE (AST) (SGOT): CPT

## 2018-04-18 PROCEDURE — 80048 BASIC METABOLIC PNL TOTAL CA: CPT

## 2018-04-18 PROCEDURE — 83036 HEMOGLOBIN GLYCOSYLATED A1C: CPT

## 2018-04-19 ENCOUNTER — HOSPITAL ENCOUNTER (OUTPATIENT)
Dept: MAMMOGRAPHY | Facility: HOSPITAL | Age: 83
Discharge: HOME | End: 2018-04-19
Attending: INTERNAL MEDICINE
Payer: MEDICARE

## 2018-04-19 DIAGNOSIS — Z12.31 VISIT FOR SCREENING MAMMOGRAM: ICD-10-CM

## 2018-04-19 PROCEDURE — 77067 SCR MAMMO BI INCL CAD: CPT | Performed by: INTERNAL MEDICINE

## 2018-04-28 NOTE — PROGRESS NOTES
HPI:    Patient ID: Samson Wood is a 80year old female.     HPI     HTN  Long standing history of hypertension     sympotms  :        Headache no  dizziness        no                             Blurred vision no  palpitaionsSyncope no  Chest pain  no MOUTH EVERY DAY AT BEDTIME AS NEEDED Disp: 60 tablet Rfl: 0   ERGOCALCIFEROL 31326 units Oral Cap TAKE 1 CAPSULE BY MOUTH EVERY 14 DAYS Disp: 6 capsule Rfl: 0   ROSUVASTATIN CALCIUM 20 MG Oral Tab TAKE 1 TABLET BY MOUTH EVERY DAY Disp: 90 tablet Rfl: 3   X Conjunctivae and EOM are normal. Pupils are equal, round, and reactive to light. Right eye exhibits no discharge. Left eye exhibits no discharge. No scleral icterus. Neck: Neck supple. No thyromegaly present.    Cardiovascular: Normal rate, regular rhythm Visit:  No prescriptions requested or ordered in this encounter    Imaging & Referrals:  None        QI#[id number]

## 2018-04-30 ENCOUNTER — PATIENT OUTREACH (OUTPATIENT)
Dept: CASE MANAGEMENT | Age: 83
End: 2018-04-30

## 2018-05-01 ENCOUNTER — TELEPHONE (OUTPATIENT)
Dept: INTERNAL MEDICINE CLINIC | Facility: CLINIC | Age: 83
End: 2018-05-01

## 2018-05-01 NOTE — TELEPHONE ENCOUNTER
Component      Latest Ref Rng & Units 4/18/2018 3/16/2018   Glucose      70 - 99 mg/dL 103 (H) 116 (H)   Sodium      136 - 144 mmol/L 141 140   Potassium      3.3 - 5.1 mmol/L 4.2 3.7   Chloride      95 - 110 mmol/L 106 105   Carbon Dioxide, Total      22

## 2018-05-01 NOTE — TELEPHONE ENCOUNTER
Spoke with patient (identified name and ), results reviewed and agrees with plan.    Labs sent to the patient in the mail per her request.    Called re labs     About the same CKD3 slightly lower GFR   Adequate water intake advised   Lipids normal  She i

## 2018-07-28 RX ORDER — ERGOCALCIFEROL 1.25 MG/1
CAPSULE ORAL
Qty: 6 CAPSULE | Refills: 1 | Status: SHIPPED | OUTPATIENT
Start: 2018-07-28 | End: 2018-08-09

## 2018-07-31 ENCOUNTER — TELEPHONE (OUTPATIENT)
Dept: INTERNAL MEDICINE CLINIC | Facility: CLINIC | Age: 83
End: 2018-07-31

## 2018-07-31 NOTE — TELEPHONE ENCOUNTER
Was in [place] for 3 wks  When she came back about a month ago  Felt weak   No appetite  Lost 8 lbs   Cannot eat  Photosensitivity  Skin burns even under a reading light  Myalgias  Muscle weakness   lightheaded  Lives alone      Denies chest pain palpation

## 2018-08-01 ENCOUNTER — HOSPITAL ENCOUNTER (OUTPATIENT)
Dept: GENERAL RADIOLOGY | Facility: HOSPITAL | Age: 83
Discharge: HOME | End: 2018-08-01
Attending: INTERNAL MEDICINE
Payer: MEDICARE

## 2018-08-01 ENCOUNTER — LAB ENCOUNTER (OUTPATIENT)
Dept: LAB | Facility: HOSPITAL | Age: 83
End: 2018-08-01
Attending: INTERNAL MEDICINE
Payer: MEDICARE

## 2018-08-01 DIAGNOSIS — I10 ESSENTIAL HYPERTENSION: ICD-10-CM

## 2018-08-01 DIAGNOSIS — I48.19 ATRIAL FIBRILLATION, PERSISTENT (HCC): ICD-10-CM

## 2018-08-01 DIAGNOSIS — F41.9 ANXIETY: ICD-10-CM

## 2018-08-01 DIAGNOSIS — R21 RASH: ICD-10-CM

## 2018-08-01 DIAGNOSIS — E55.9 VITAMIN D DEFICIENCY: ICD-10-CM

## 2018-08-01 DIAGNOSIS — Z79.899 ON AMIODARONE THERAPY: Chronic | ICD-10-CM

## 2018-08-01 DIAGNOSIS — L56.8 PHOTOSENSITIVITY: ICD-10-CM

## 2018-08-01 DIAGNOSIS — E78.5 HYPERLIPIDEMIA, UNSPECIFIED HYPERLIPIDEMIA TYPE: ICD-10-CM

## 2018-08-01 LAB
25(OH)D3 SERPL-MCNC: 22.8 NG/ML
ALBUMIN SERPL BCP-MCNC: 3.6 G/DL (ref 3.5–4.8)
ALBUMIN/GLOB SERPL: 1.3 {RATIO} (ref 1–2)
ALP SERPL-CCNC: 69 U/L (ref 32–100)
ALT SERPL-CCNC: 37 U/L (ref 14–54)
ANION GAP SERPL CALC-SCNC: 7 MMOL/L (ref 0–18)
AST SERPL-CCNC: 33 U/L (ref 15–41)
BILIRUB SERPL-MCNC: 1.2 MG/DL (ref 0.3–1.2)
BUN SERPL-MCNC: 9 MG/DL (ref 8–20)
BUN/CREAT SERPL: 8.8 (ref 10–20)
C3 SERPL-MCNC: 113 MG/DL (ref 88–201)
C4 SERPL-MCNC: 32 MG/DL (ref 18–55)
CALCIUM SERPL-MCNC: 9.2 MG/DL (ref 8.5–10.5)
CHLORIDE SERPL-SCNC: 104 MMOL/L (ref 95–110)
CO2 SERPL-SCNC: 29 MMOL/L (ref 22–32)
CREAT SERPL-MCNC: 1.02 MG/DL (ref 0.5–1.5)
CRP SERPL-MCNC: <0.5 MG/DL (ref 0–0.9)
ERYTHROCYTE [DISTWIDTH] IN BLOOD BY AUTOMATED COUNT: 14.3 % (ref 11–15)
FOLATE SERPL-MCNC: 7.3 NG/ML
GLOBULIN PLAS-MCNC: 2.7 G/DL (ref 2.5–3.7)
GLUCOSE SERPL-MCNC: 159 MG/DL (ref 70–99)
HCT VFR BLD AUTO: 42.8 % (ref 35–48)
HGB BLD-MCNC: 14.6 G/DL (ref 12–16)
HYALINE CASTS #/AREA URNS AUTO: 11 /LPF
MCH RBC QN AUTO: 33.9 PG (ref 27–32)
MCHC RBC AUTO-ENTMCNC: 34.1 G/DL (ref 32–37)
MCV RBC AUTO: 99.4 FL (ref 80–100)
OSMOLALITY UR CALC.SUM OF ELEC: 292 MOSM/KG (ref 275–295)
PATIENT FASTING: NO
PLATELET # BLD AUTO: 198 K/UL (ref 140–400)
PMV BLD AUTO: 9.4 FL (ref 7.4–10.3)
POTASSIUM SERPL-SCNC: 3.7 MMOL/L (ref 3.3–5.1)
PROT SERPL-MCNC: 6.3 G/DL (ref 5.9–8.4)
RBC # BLD AUTO: 4.3 M/UL (ref 3.7–5.4)
RBC #/AREA URNS AUTO: 3 /HPF
RHEUMATOID FACT SER QL: <5 IU/ML
SODIUM SERPL-SCNC: 140 MMOL/L (ref 136–144)
T4 FREE SERPL-MCNC: 1.35 NG/DL (ref 0.58–1.64)
TSH SERPL-ACNC: 3.76 UIU/ML (ref 0.45–5.33)
VIT B12 SERPL-MCNC: 806 PG/ML (ref 181–914)
WBC # BLD AUTO: 3.4 K/UL (ref 4–11)
WBC #/AREA URNS AUTO: 6 /HPF

## 2018-08-01 PROCEDURE — 84443 ASSAY THYROID STIM HORMONE: CPT

## 2018-08-01 PROCEDURE — 87086 URINE CULTURE/COLONY COUNT: CPT

## 2018-08-01 PROCEDURE — 93010 ELECTROCARDIOGRAM REPORT: CPT | Performed by: INTERNAL MEDICINE

## 2018-08-01 PROCEDURE — 82103 ALPHA-1-ANTITRYPSIN TOTAL: CPT

## 2018-08-01 PROCEDURE — 85027 COMPLETE CBC AUTOMATED: CPT

## 2018-08-01 PROCEDURE — 82306 VITAMIN D 25 HYDROXY: CPT

## 2018-08-01 PROCEDURE — 36415 COLL VENOUS BLD VENIPUNCTURE: CPT

## 2018-08-01 PROCEDURE — 93005 ELECTROCARDIOGRAM TRACING: CPT

## 2018-08-01 PROCEDURE — 81015 MICROSCOPIC EXAM OF URINE: CPT

## 2018-08-01 PROCEDURE — 86140 C-REACTIVE PROTEIN: CPT

## 2018-08-01 PROCEDURE — 84439 ASSAY OF FREE THYROXINE: CPT

## 2018-08-01 PROCEDURE — 86038 ANTINUCLEAR ANTIBODIES: CPT

## 2018-08-01 PROCEDURE — 86160 COMPLEMENT ANTIGEN: CPT

## 2018-08-01 PROCEDURE — 80053 COMPREHEN METABOLIC PANEL: CPT

## 2018-08-01 PROCEDURE — 84165 PROTEIN E-PHORESIS SERUM: CPT

## 2018-08-01 PROCEDURE — 71046 X-RAY EXAM CHEST 2 VIEWS: CPT | Performed by: INTERNAL MEDICINE

## 2018-08-01 PROCEDURE — 82746 ASSAY OF FOLIC ACID SERUM: CPT

## 2018-08-01 PROCEDURE — 82607 VITAMIN B-12: CPT

## 2018-08-01 PROCEDURE — 86431 RHEUMATOID FACTOR QUANT: CPT

## 2018-08-02 LAB
ALPHA-1-ANTITRYPSIN: 147 MG/DL
NUCLEAR IGG TITR SER IF: NEGATIVE {TITER}

## 2018-08-03 LAB
ALBUMIN SERPL ELPH-MCNC: 3.73 G/DL (ref 3.75–5.21)
ALBUMIN/GLOB SERPL: 1.51 {RATIO} (ref 1–2)
ALPHA1 GLOB SERPL ELPH-MCNC: 0.3 G/DL (ref 0.19–0.46)
ALPHA2 GLOB SERPL ELPH-MCNC: 0.64 G/DL (ref 0.48–1.05)
B-GLOBULIN SERPL ELPH-MCNC: 0.65 G/DL (ref 0.68–1.23)
GAMMA GLOB SERPL ELPH-MCNC: 0.88 G/DL (ref 0.62–1.7)
TOTAL PROTEIN (SPECIAL TESTING): 6.2 G/DL (ref 6.5–9.1)

## 2018-08-06 ENCOUNTER — NURSE TRIAGE (OUTPATIENT)
Dept: OTHER | Age: 83
End: 2018-08-06

## 2018-08-06 NOTE — TELEPHONE ENCOUNTER
Pt called back, states that she prefers to see Dr. Patrick Juan at [place] location. Please advise if pt can be seen tomorrow. Pt states she was told by Dr. Soler December on Friday that she needs to be seen today.  Pt does not want to go to [facility], however, would rather see

## 2018-08-06 NOTE — TELEPHONE ENCOUNTER
Action Requested: Summary for Provider     []  Critical Lab, Recommendations Needed  [] Need Additional Advice  []   FYI    []   Need Orders  [] Need Medications Sent to Pharmacy  []  Other     SUMMARY: Pt states that she spoke to MMP on Friday regarding l

## 2018-08-09 ENCOUNTER — OFFICE VISIT (OUTPATIENT)
Dept: INTERNAL MEDICINE CLINIC | Facility: CLINIC | Age: 83
End: 2018-08-09
Payer: MEDICARE

## 2018-08-09 VITALS
RESPIRATION RATE: 18 BRPM | DIASTOLIC BLOOD PRESSURE: 81 MMHG | SYSTOLIC BLOOD PRESSURE: 156 MMHG | HEART RATE: 62 BPM | BODY MASS INDEX: 25.76 KG/M2 | HEIGHT: 68 IN | WEIGHT: 170 LBS

## 2018-08-09 DIAGNOSIS — I48.0 PAROXYSMAL ATRIAL FIBRILLATION (HCC): ICD-10-CM

## 2018-08-09 DIAGNOSIS — Z79.899 ON AMIODARONE THERAPY: ICD-10-CM

## 2018-08-09 DIAGNOSIS — R53.83 FATIGUE, UNSPECIFIED TYPE: ICD-10-CM

## 2018-08-09 DIAGNOSIS — E55.9 VITAMIN D DEFICIENCY: ICD-10-CM

## 2018-08-09 DIAGNOSIS — E78.5 HYPERLIPIDEMIA, UNSPECIFIED HYPERLIPIDEMIA TYPE: ICD-10-CM

## 2018-08-09 DIAGNOSIS — I10 ESSENTIAL HYPERTENSION: ICD-10-CM

## 2018-08-09 DIAGNOSIS — R63.4 WEIGHT LOSS: ICD-10-CM

## 2018-08-09 DIAGNOSIS — R31.29 MICROSCOPIC HEMATURIA: Primary | ICD-10-CM

## 2018-08-09 PROCEDURE — 99214 OFFICE O/P EST MOD 30 MIN: CPT | Performed by: INTERNAL MEDICINE

## 2018-08-09 PROCEDURE — G0463 HOSPITAL OUTPT CLINIC VISIT: HCPCS | Performed by: INTERNAL MEDICINE

## 2018-08-09 RX ORDER — MIRTAZAPINE 15 MG/1
15 TABLET, FILM COATED ORAL NIGHTLY
Qty: 30 TABLET | Refills: 0 | Status: SHIPPED | OUTPATIENT
Start: 2018-08-09 | End: 2018-09-08

## 2018-08-09 RX ORDER — LOTEPREDNOL ETABONATE 5 MG/ML
SUSPENSION/ DROPS OPHTHALMIC
COMMUNITY
Start: 2018-08-04 | End: 2019-01-16

## 2018-08-09 RX ORDER — ERGOCALCIFEROL 1.25 MG/1
50000 CAPSULE ORAL WEEKLY
Qty: 12 CAPSULE | Refills: 1 | Status: SHIPPED | OUTPATIENT
Start: 2018-08-09 | End: 2019-03-15

## 2018-08-10 NOTE — PROGRESS NOTES
HPI:    Patient ID: Ishan Sung is a 80year old female.     HPI    Follow   Up      Complain of fatigue  Loss of appetite  Skin burns easily  Currently  No skin redness or blister     Was exposed to the sun  Had sunburn but is much better      Was in G Rfl: 1   mirtazapine 15 MG Oral Tab Take 1 tablet (15 mg total) by mouth nightly.  Disp: 30 tablet Rfl: 0   ZOLPIDEM TARTRATE 5 MG Oral Tab TAKE 1 TABLET BY MOUTH EVERY DAY AT BEDTIME AS NEEDED Disp: 60 tablet Rfl: 0   ROSUVASTATIN CALCIUM 20 MG Oral Tab TA clear and moist. No oropharyngeal exudate. Eyes: Conjunctivae and EOM are normal. Pupils are equal, round, and reactive to light. Right eye exhibits no discharge. Left eye exhibits no discharge. No scleral icterus. Neck: Neck supple.  No thyromegaly pre (L)    GFR, -American      >=60 >60    Patient Fasting?            WBC      4.0 - 11.0 K/UL     RBC      3.70 - 5.40 M/UL     Hemoglobin      12.0 - 16.0 g/dL     Hematocrit      35.0 - 48.0 %     MCV      80.0 - 100.0 fL     MCH      27.0 - 32.0 pg g/dL 3.6   GLOBULIN, TOTAL      2.5 - 3.7 g/dL 2.7   A/G RATIO      1.0 - 2.0 1.3   ANION GAP      0 - 18 mmol/L 7   BUN/CREA RATIO      10.0 - 20.0 8.8 (L)   CALCULATED OSMOLALITY      275 - 295 mOsm/kg 292   GFR, Non-      >=60 51 (L)   G kg)  04/17/18 : 175 lb (79.4 kg)  10/03/17 : 181 lb (82.1 kg)  09/22/17 : 182 lb (82.6 kg)  06/20/17 : 187 lb (84.8 kg)  04/26/17 : 195 lb 4.8 oz (88.6 kg)    Pt is eating better  heatlhy choices  Appetite is less  Lives alone    Poor sleep   Anxious  Body

## 2018-08-11 ENCOUNTER — HOSPITAL ENCOUNTER (OUTPATIENT)
Dept: CT IMAGING | Facility: HOSPITAL | Age: 83
Discharge: HOME | End: 2018-08-11
Attending: INTERNAL MEDICINE
Payer: MEDICARE

## 2018-08-11 DIAGNOSIS — R63.4 WEIGHT LOSS: ICD-10-CM

## 2018-08-11 DIAGNOSIS — R31.29 MICROSCOPIC HEMATURIA: ICD-10-CM

## 2018-08-11 LAB — CREAT BLD-MCNC: 0.9 MG/DL (ref 0.5–1.5)

## 2018-08-11 PROCEDURE — 82565 ASSAY OF CREATININE: CPT

## 2018-08-11 PROCEDURE — 74178 CT ABD&PLV WO CNTR FLWD CNTR: CPT | Performed by: INTERNAL MEDICINE

## 2018-08-18 NOTE — TELEPHONE ENCOUNTER
LOV: 8-9-18 Last Rx: 7-4-17     No protocol     Please advise in regards to refill request. Thank You

## 2018-08-20 RX ORDER — AMIODARONE HYDROCHLORIDE 200 MG/1
200 TABLET ORAL DAILY
Qty: 90 TABLET | Refills: 3 | Status: SHIPPED | OUTPATIENT
Start: 2018-08-20 | End: 2019-02-07

## 2018-09-16 ENCOUNTER — APPOINTMENT (OUTPATIENT)
Dept: GENERAL RADIOLOGY | Facility: HOSPITAL | Age: 83
End: 2018-09-16
Attending: EMERGENCY MEDICINE
Payer: MEDICARE

## 2018-09-16 ENCOUNTER — HOSPITAL ENCOUNTER (EMERGENCY)
Facility: HOSPITAL | Age: 83
Discharge: HOME | End: 2018-09-16
Attending: EMERGENCY MEDICINE
Payer: MEDICARE

## 2018-09-16 ENCOUNTER — APPOINTMENT (OUTPATIENT)
Dept: CT IMAGING | Facility: HOSPITAL | Age: 83
End: 2018-09-16
Attending: EMERGENCY MEDICINE
Payer: MEDICARE

## 2018-09-16 ENCOUNTER — PRIOR ORIGINAL RECORDS (OUTPATIENT)
Dept: OTHER | Age: 83
End: 2018-09-16

## 2018-09-16 VITALS
TEMPERATURE: 97 F | DIASTOLIC BLOOD PRESSURE: 101 MMHG | BODY MASS INDEX: 26 KG/M2 | RESPIRATION RATE: 20 BRPM | WEIGHT: 169.75 LBS | OXYGEN SATURATION: 96 % | SYSTOLIC BLOOD PRESSURE: 161 MMHG | HEART RATE: 69 BPM

## 2018-09-16 DIAGNOSIS — R42 VERTIGO: Primary | ICD-10-CM

## 2018-09-16 LAB
ALBUMIN SERPL BCP-MCNC: 3.5 G/DL (ref 3.5–4.8)
ALP SERPL-CCNC: 65 U/L (ref 32–100)
ALT SERPL-CCNC: 35 U/L (ref 14–54)
ANION GAP SERPL CALC-SCNC: 7 MMOL/L (ref 0–18)
AST SERPL-CCNC: 35 U/L (ref 15–41)
BACTERIA UR QL AUTO: NEGATIVE /HPF
BASOPHILS # BLD: 0 K/UL (ref 0–0.2)
BASOPHILS NFR BLD: 1 %
BILIRUB DIRECT SERPL-MCNC: 0.2 MG/DL (ref 0–0.2)
BILIRUB SERPL-MCNC: 1 MG/DL (ref 0.3–1.2)
BILIRUB UR QL: NEGATIVE
BUN SERPL-MCNC: 12 MG/DL (ref 8–20)
BUN/CREAT SERPL: 12 (ref 10–20)
CALCIUM SERPL-MCNC: 8.8 MG/DL (ref 8.5–10.5)
CHLORIDE SERPL-SCNC: 108 MMOL/L (ref 95–110)
CLARITY UR: CLEAR
CO2 SERPL-SCNC: 26 MMOL/L (ref 22–32)
COLOR UR: YELLOW
CREAT SERPL-MCNC: 1 MG/DL (ref 0.5–1.5)
EOSINOPHIL # BLD: 0.1 K/UL (ref 0–0.7)
EOSINOPHIL NFR BLD: 2 %
ERYTHROCYTE [DISTWIDTH] IN BLOOD BY AUTOMATED COUNT: 14.7 % (ref 11–15)
GLUCOSE SERPL-MCNC: 98 MG/DL (ref 70–99)
GLUCOSE UR-MCNC: NEGATIVE MG/DL
HCT VFR BLD AUTO: 44.3 % (ref 35–48)
HGB BLD-MCNC: 14.4 G/DL (ref 12–16)
HGB UR QL STRIP.AUTO: NEGATIVE
KETONES UR-MCNC: NEGATIVE MG/DL
LYMPHOCYTES # BLD: 0.8 K/UL (ref 1–4)
LYMPHOCYTES NFR BLD: 18 %
MCH RBC QN AUTO: 32.7 PG (ref 27–32)
MCHC RBC AUTO-ENTMCNC: 32.5 G/DL (ref 32–37)
MCV RBC AUTO: 100.3 FL (ref 80–100)
MONOCYTES # BLD: 0.5 K/UL (ref 0–1)
MONOCYTES NFR BLD: 11 %
NEUTROPHILS # BLD AUTO: 2.9 K/UL (ref 1.8–7.7)
NEUTROPHILS NFR BLD: 68 %
NITRITE UR QL STRIP.AUTO: NEGATIVE
OSMOLALITY UR CALC.SUM OF ELEC: 292 MOSM/KG (ref 275–295)
PH UR: 6 [PH] (ref 5–8)
PLATELET # BLD AUTO: 183 K/UL (ref 140–400)
PMV BLD AUTO: 9.2 FL (ref 7.4–10.3)
POTASSIUM SERPL-SCNC: 4.4 MMOL/L (ref 3.3–5.1)
PROT SERPL-MCNC: 6 G/DL (ref 5.9–8.4)
PROT UR-MCNC: NEGATIVE MG/DL
RBC # BLD AUTO: 4.42 M/UL (ref 3.7–5.4)
RBC #/AREA URNS AUTO: 2 /HPF
SODIUM SERPL-SCNC: 141 MMOL/L (ref 136–144)
SP GR UR STRIP: 1.01 (ref 1–1.03)
UROBILINOGEN UR STRIP-ACNC: 2
VIT C UR-MCNC: 20 MG/DL
WBC # BLD AUTO: 4.2 K/UL (ref 4–11)
WBC #/AREA URNS AUTO: 5 /HPF

## 2018-09-16 PROCEDURE — 36415 COLL VENOUS BLD VENIPUNCTURE: CPT

## 2018-09-16 PROCEDURE — 71045 X-RAY EXAM CHEST 1 VIEW: CPT | Performed by: EMERGENCY MEDICINE

## 2018-09-16 PROCEDURE — 80076 HEPATIC FUNCTION PANEL: CPT | Performed by: EMERGENCY MEDICINE

## 2018-09-16 PROCEDURE — 93005 ELECTROCARDIOGRAM TRACING: CPT

## 2018-09-16 PROCEDURE — 99285 EMERGENCY DEPT VISIT HI MDM: CPT

## 2018-09-16 PROCEDURE — 93010 ELECTROCARDIOGRAM REPORT: CPT | Performed by: EMERGENCY MEDICINE

## 2018-09-16 PROCEDURE — 70450 CT HEAD/BRAIN W/O DYE: CPT | Performed by: EMERGENCY MEDICINE

## 2018-09-16 PROCEDURE — 81001 URINALYSIS AUTO W/SCOPE: CPT | Performed by: EMERGENCY MEDICINE

## 2018-09-16 PROCEDURE — 85025 COMPLETE CBC W/AUTO DIFF WBC: CPT | Performed by: EMERGENCY MEDICINE

## 2018-09-16 PROCEDURE — 80048 BASIC METABOLIC PNL TOTAL CA: CPT | Performed by: EMERGENCY MEDICINE

## 2018-09-16 RX ORDER — ONDANSETRON 4 MG/1
4 TABLET, ORALLY DISINTEGRATING ORAL ONCE
Status: COMPLETED | OUTPATIENT
Start: 2018-09-16 | End: 2018-09-16

## 2018-09-16 RX ORDER — ONDANSETRON 4 MG/1
4 TABLET, ORALLY DISINTEGRATING ORAL EVERY 4 HOURS PRN
Qty: 15 TABLET | Refills: 0 | Status: SHIPPED | OUTPATIENT
Start: 2018-09-16 | End: 2018-10-04

## 2018-09-16 RX ORDER — MECLIZINE HYDROCHLORIDE 25 MG/1
25 TABLET ORAL 2 TIMES DAILY PRN
Qty: 20 TABLET | Refills: 0 | Status: SHIPPED | OUTPATIENT
Start: 2018-09-16 | End: 2019-07-16 | Stop reason: ALTCHOICE

## 2018-09-16 RX ORDER — MECLIZINE HYDROCHLORIDE 25 MG/1
25 TABLET ORAL ONCE
Status: COMPLETED | OUTPATIENT
Start: 2018-09-16 | End: 2018-09-16

## 2018-09-17 NOTE — ED PROVIDER NOTES
Patient Seen in: [facility] AND [facility] Emergency Department    History   Patient presents with:  Dizziness (neurologic)    Stated Complaint: pt states she is very dizzy and very tired and hasn't been eating    HPI    Patient complains of dizziness that start mouth daily.          Family History   Problem Relation Age of Onset   • Other (Varicose veins [Other]) Mother    • Other (Varicose veins [Other]) Father        Social History    Tobacco Use      Smoking status: Never Smoker      Smokeless tobacco: Never Us Ascorbic Acid Urine 20  (*)     All other components within normal limits   CBC W/ DIFFERENTIAL - Abnormal; Notable for the following components:    .3 (*)     MCH 32.7 (*)     Lymphocyte Absolute 0.8 (*)     All other components within normal li Clinical Impression:  Vertigo  (primary encounter diagnosis)    Disposition:  Discharge  9/16/2018  8:17 pm    Follow-up:  Nannette Tipton MD  [address] [facility]  [phone]          [place], [place] [address]

## 2018-09-18 ENCOUNTER — TELEPHONE (OUTPATIENT)
Dept: OTHER | Age: 83
End: 2018-09-18

## 2018-09-18 NOTE — TELEPHONE ENCOUNTER
Spoke with neighbor Jovi and reports patient \"looked bad and felt bad earlier. \"    Jovi walked back over to patient's house--spoke with patient and relayed MMP message below.     Patient states Jovi picked up Meclizine and Zofran today (patient paid $100 out

## 2018-09-18 NOTE — TELEPHONE ENCOUNTER
Dr Goetz=please see message below and asking for your recommendation,still feeling sick.     Patient calling and states that she was at ER last 9/16/18 , all tests were normal and was prescribed with 2 medications, ondansetron and meclizine, did not start

## 2018-09-18 NOTE — TELEPHONE ENCOUNTER
I agree with back to ER  Pt lives alone have no support     Hx of atrial fib paroxysmal  HTN    Feeling poorly with loss of appetitire

## 2018-09-19 LAB
BUN: 12 MG/DL
CALCIUM: 8.8 MG/DL
CHLORIDE: 108 MEQ/L
CHOLESTEROL, TOTAL: 151 MG/DL
CREATININE, SERUM: 1 MG/DL
GLUCOSE: 98 MG/DL
HDL CHOLESTEROL: 65 MG/DL
HEMATOCRIT: 44.3 %
HEMOGLOBIN: 14.4 G/DL
LDL CHOLESTEROL: 74 MG/DL
NON-HDL CHOLESTEROL: 86 MG/DL
PLATELETS: 183 K/UL
POTASSIUM, SERUM: 4.4 MEQ/L
RED BLOOD COUNT: 4.42 X 10-6/U
SODIUM: 141 MEQ/L
TRIGLYCERIDES: 58 MG/DL
WHITE BLOOD COUNT: 4.2 X 10-3/U

## 2018-09-20 NOTE — TELEPHONE ENCOUNTER
Called patient who stated she is feeling better today.  Taking meclizine for dizziness and zofran for nausea as presrcibed by Dr. Pao Galaviz, hospitalist. Patient has appointment with her cardiologist, Dr. Michelle Gonzalez for 9/21/18 at 1130 am. Scheduled ER follow-up appo

## 2018-09-21 ENCOUNTER — PRIOR ORIGINAL RECORDS (OUTPATIENT)
Dept: OTHER | Age: 83
End: 2018-09-21

## 2018-09-21 ENCOUNTER — TELEPHONE (OUTPATIENT)
Dept: INTERNAL MEDICINE CLINIC | Facility: CLINIC | Age: 83
End: 2018-09-21

## 2018-09-21 ENCOUNTER — OFFICE VISIT (OUTPATIENT)
Dept: INTERNAL MEDICINE CLINIC | Facility: CLINIC | Age: 83
End: 2018-09-21
Payer: MEDICARE

## 2018-09-21 VITALS
WEIGHT: 172 LBS | DIASTOLIC BLOOD PRESSURE: 77 MMHG | TEMPERATURE: 98 F | HEART RATE: 71 BPM | SYSTOLIC BLOOD PRESSURE: 132 MMHG | BODY MASS INDEX: 26 KG/M2

## 2018-09-21 DIAGNOSIS — R53.83 FATIGUE, UNSPECIFIED TYPE: ICD-10-CM

## 2018-09-21 DIAGNOSIS — I48.0 PAROXYSMAL ATRIAL FIBRILLATION (HCC): ICD-10-CM

## 2018-09-21 DIAGNOSIS — R31.9 HEMATURIA, UNSPECIFIED TYPE: ICD-10-CM

## 2018-09-21 DIAGNOSIS — E78.5 HYPERLIPIDEMIA, UNSPECIFIED HYPERLIPIDEMIA TYPE: ICD-10-CM

## 2018-09-21 DIAGNOSIS — Z79.899 ON AMIODARONE THERAPY: ICD-10-CM

## 2018-09-21 DIAGNOSIS — I10 ESSENTIAL HYPERTENSION: Primary | ICD-10-CM

## 2018-09-21 PROCEDURE — 99214 OFFICE O/P EST MOD 30 MIN: CPT | Performed by: INTERNAL MEDICINE

## 2018-09-21 PROCEDURE — G0463 HOSPITAL OUTPT CLINIC VISIT: HCPCS | Performed by: INTERNAL MEDICINE

## 2018-09-21 NOTE — TELEPHONE ENCOUNTER
Katharine Daniels is calling to inform that they are having trouble reaching the pt. This is just an [place]. Thank you.

## 2018-09-24 ENCOUNTER — PRIOR ORIGINAL RECORDS (OUTPATIENT)
Dept: OTHER | Age: 83
End: 2018-09-24

## 2018-09-24 ENCOUNTER — APPOINTMENT (OUTPATIENT)
Dept: LAB | Facility: HOSPITAL | Age: 83
End: 2018-09-24
Attending: INTERNAL MEDICINE
Payer: MEDICARE

## 2018-09-24 DIAGNOSIS — I48.91 ATRIAL FIBRILLATION, UNSPECIFIED TYPE (HCC): ICD-10-CM

## 2018-09-24 LAB
ANION GAP SERPL CALC-SCNC: 9 MMOL/L (ref 0–18)
BUN SERPL-MCNC: 11 MG/DL (ref 8–20)
BUN/CREAT SERPL: 10.4 (ref 10–20)
CALCIUM SERPL-MCNC: 9.2 MG/DL (ref 8.5–10.5)
CHLORIDE SERPL-SCNC: 105 MMOL/L (ref 95–110)
CO2 SERPL-SCNC: 29 MMOL/L (ref 22–32)
CREAT SERPL-MCNC: 1.06 MG/DL (ref 0.5–1.5)
GLUCOSE SERPL-MCNC: 140 MG/DL (ref 70–99)
OSMOLALITY UR CALC.SUM OF ELEC: 298 MOSM/KG (ref 275–295)
POTASSIUM SERPL-SCNC: 4.2 MMOL/L (ref 3.3–5.1)
SODIUM SERPL-SCNC: 143 MMOL/L (ref 136–144)

## 2018-09-24 PROCEDURE — 36415 COLL VENOUS BLD VENIPUNCTURE: CPT

## 2018-09-24 PROCEDURE — 80048 BASIC METABOLIC PNL TOTAL CA: CPT

## 2018-09-26 ENCOUNTER — HOSPITAL ENCOUNTER (OUTPATIENT)
Dept: INTERVENTIONAL RADIOLOGY/VASCULAR | Facility: HOSPITAL | Age: 83
Discharge: HOME | End: 2018-09-26
Attending: INTERNAL MEDICINE | Admitting: INTERNAL MEDICINE
Payer: MEDICARE

## 2018-09-26 VITALS
OXYGEN SATURATION: 96 % | RESPIRATION RATE: 17 BRPM | HEART RATE: 50 BPM | DIASTOLIC BLOOD PRESSURE: 82 MMHG | SYSTOLIC BLOOD PRESSURE: 136 MMHG

## 2018-09-26 DIAGNOSIS — I48.91 A-FIB (HCC): ICD-10-CM

## 2018-09-26 PROCEDURE — 93005 ELECTROCARDIOGRAM TRACING: CPT

## 2018-09-26 PROCEDURE — 93010 ELECTROCARDIOGRAM REPORT: CPT | Performed by: INTERNAL MEDICINE

## 2018-09-26 PROCEDURE — 5A2204Z RESTORATION OF CARDIAC RHYTHM, SINGLE: ICD-10-PCS | Performed by: INTERNAL MEDICINE

## 2018-09-26 PROCEDURE — 92960 CARDIOVERSION ELECTRIC EXT: CPT

## 2018-09-26 RX ORDER — SODIUM CHLORIDE 9 MG/ML
INJECTION, SOLUTION INTRAVENOUS CONTINUOUS
Status: DISCONTINUED | OUTPATIENT
Start: 2018-09-26 | End: 2018-09-26

## 2018-09-26 RX ORDER — HEPARIN SODIUM 1000 [USP'U]/ML
INJECTION, SOLUTION INTRAVENOUS; SUBCUTANEOUS
Status: COMPLETED
Start: 2018-09-26 | End: 2018-09-26

## 2018-09-26 RX ORDER — SODIUM CHLORIDE 9 MG/ML
INJECTION, SOLUTION INTRAVENOUS
Status: COMPLETED
Start: 2018-09-26 | End: 2018-09-26

## 2018-09-26 RX ADMIN — Medication 60 MG: at 10:03:00

## 2018-09-26 RX ADMIN — SODIUM CHLORIDE: 9 INJECTION, SOLUTION INTRAVENOUS at 09:45:00

## 2018-09-26 NOTE — PROGRESS NOTES
Pt able to eat and drink, no nausea or vomiting. Ekg done. Pt to follow up with Afib clinic as scheduled and with Dr. Brittany Toro as needed.  Discharge instructions given to pt and family

## 2018-09-26 NOTE — PROCEDURES
PROCEDURE(S) PERFORMED:    1. Cardioversion. 2.     Sedation     :  Louis Allen MD    ANESTHESIA:  IV sedation. Moderate conscious sedation for this procedure was administered and personally monitored from 9:40 until 9:50.      INDICATION:  Per

## 2018-10-01 NOTE — PROGRESS NOTES
HPI:    Patient ID: Gloria Hamilton is a 80year old female.     HPI    Was in the ER diagnosed with Vertigo  ER notes reivewed    Was seen by DR Naye COHEN  Planned  Cardioversion for recurren atrial fib    Pt is feeling better  No reporducible vertiog toda daily as needed. Disp: 20 tablet Rfl: 0   ondansetron 4 MG Oral Tablet Dispersible Take 1 tablet (4 mg total) by mouth every 4 (four) hours as needed for Nausea.  Disp: 15 tablet Rfl: 0   amiodarone HCl 200 MG Oral Tab Take 1 tablet (200 mg total) by mouth EXAM:    Physical Exam   Constitutional: She appears well-nourished. No distress. HENT:   Head: Normocephalic and atraumatic.    Right Ear: External ear normal.   Left Ear: External ear normal.   Nose: Nose normal.   Mouth/Throat: Oropharynx is clear and Refill will be given as needed . Mediation side effect reviewed. Most recent laboratory and diagnostic testing reviewed. Dietary and lifestyle change discussed. Modification of risk for CAD discussed.   Patient voiced understanding and agrees with Mary Beth Malik

## 2018-10-04 ENCOUNTER — OFFICE VISIT (OUTPATIENT)
Dept: CARDIOLOGY CLINIC | Facility: HOSPITAL | Age: 83
End: 2018-10-04
Attending: INTERNAL MEDICINE
Payer: MEDICARE

## 2018-10-04 VITALS
DIASTOLIC BLOOD PRESSURE: 70 MMHG | OXYGEN SATURATION: 99 % | WEIGHT: 174.19 LBS | HEART RATE: 55 BPM | BODY MASS INDEX: 26 KG/M2 | SYSTOLIC BLOOD PRESSURE: 155 MMHG

## 2018-10-04 DIAGNOSIS — I48.91 ATRIAL FIBRILLATION (HCC): Primary | ICD-10-CM

## 2018-10-04 DIAGNOSIS — I48.0 PAROXYSMAL ATRIAL FIBRILLATION (HCC): ICD-10-CM

## 2018-10-04 PROCEDURE — 99214 OFFICE O/P EST MOD 30 MIN: CPT | Performed by: CLINICAL NURSE SPECIALIST

## 2018-10-04 PROCEDURE — 99212 OFFICE O/P EST SF 10 MIN: CPT | Performed by: CLINICAL NURSE SPECIALIST

## 2018-10-04 PROCEDURE — 93005 ELECTROCARDIOGRAM TRACING: CPT

## 2018-10-04 PROCEDURE — 93010 ELECTROCARDIOGRAM REPORT: CPT | Performed by: CLINICAL NURSE SPECIALIST

## 2018-10-04 NOTE — PROGRESS NOTES
1740 [facility] Patient Status:  Outpatient    1934 MRN [id number]   Location MD Chani Medrano MD        Elizabeth Ma is a 80year old female who presents to clini 09/24/2018 02:36 PM     09/24/2018 02:36 PM    K 4.2 09/24/2018 02:36 PM     09/24/2018 02:36 PM    CO2 29 09/24/2018 02:36 PM     (H) 09/24/2018 02:36 PM    CA 9.2 09/24/2018 02:36 PM    ALB 3.5 09/16/2018 06:49 PM    ALKPHO 65 09/16/20 foods, fluid restrictions, daily weights, medication regimen s/s atrial fibrillation exacerbation and when to call APN/clinic. Patient receptive.     Assessment:  Persistent/ recurrent atrial fibrillation, switched from flecainide to amiodarone after recurr

## 2018-10-15 RX ORDER — LEVOTHYROXINE SODIUM 0.03 MG/1
TABLET ORAL
Qty: 90 TABLET | Refills: 3 | Status: SHIPPED | OUTPATIENT
Start: 2018-10-15 | End: 2019-11-05

## 2018-10-24 LAB
BUN: 11 MG/DL
CALCIUM: 9.2 MG/DL
CHLORIDE: 105 MEQ/L
CREATININE, SERUM: 1.06 MG/DL
GLUCOSE: 140 MG/DL
POTASSIUM, SERUM: 4.2 MEQ/L
SODIUM: 143 MEQ/L

## 2018-10-26 ENCOUNTER — PRIOR ORIGINAL RECORDS (OUTPATIENT)
Dept: OTHER | Age: 83
End: 2018-10-26

## 2018-11-01 ENCOUNTER — PRIOR ORIGINAL RECORDS (OUTPATIENT)
Dept: OTHER | Age: 83
End: 2018-11-01

## 2018-11-01 ENCOUNTER — LAB ENCOUNTER (OUTPATIENT)
Dept: LAB | Facility: HOSPITAL | Age: 83
End: 2018-11-01
Attending: INTERNAL MEDICINE
Payer: MEDICARE

## 2018-11-01 DIAGNOSIS — I48.91 ATRIAL FIBRILLATION (HCC): Primary | ICD-10-CM

## 2018-11-01 DIAGNOSIS — Z79.899 ENCOUNTER FOR LONG-TERM (CURRENT) DRUG USE: ICD-10-CM

## 2018-11-01 PROCEDURE — 84460 ALANINE AMINO (ALT) (SGPT): CPT

## 2018-11-01 PROCEDURE — 84450 TRANSFERASE (AST) (SGOT): CPT

## 2018-11-01 PROCEDURE — 84443 ASSAY THYROID STIM HORMONE: CPT

## 2018-11-01 PROCEDURE — 36415 COLL VENOUS BLD VENIPUNCTURE: CPT

## 2018-11-01 PROCEDURE — 80048 BASIC METABOLIC PNL TOTAL CA: CPT

## 2018-11-02 ENCOUNTER — PRIOR ORIGINAL RECORDS (OUTPATIENT)
Dept: OTHER | Age: 83
End: 2018-11-02

## 2018-11-02 LAB
ALT (SGPT): 50 U/L
AST (SGOT): 40 U/L
BUN: 9 MG/DL
CALCIUM: 9.3 MG/DL
CHLORIDE: 107 MEQ/L
CREATININE, SERUM: 0.96 MG/DL
GLUCOSE: 132 MG/DL
POTASSIUM, SERUM: 3.9 MEQ/L
SGOT (AST): 40 IU/L
SGPT (ALT): 50 IU/L
SODIUM: 141 MEQ/L
THYROID STIMULATING HORMONE: 2.35 MLU/L

## 2018-11-21 ENCOUNTER — MYAURORA ACCOUNT LINK (OUTPATIENT)
Dept: OTHER | Age: 83
End: 2018-11-21

## 2018-11-27 ENCOUNTER — PRIOR ORIGINAL RECORDS (OUTPATIENT)
Dept: OTHER | Age: 83
End: 2018-11-27

## 2018-12-06 ENCOUNTER — TELEPHONE (OUTPATIENT)
Dept: INTERNAL MEDICINE CLINIC | Facility: CLINIC | Age: 83
End: 2018-12-06

## 2018-12-07 RX ORDER — ROSUVASTATIN CALCIUM 20 MG/1
TABLET, COATED ORAL
Qty: 90 TABLET | Refills: 3 | Status: SHIPPED | OUTPATIENT
Start: 2018-12-07 | End: 2018-12-25

## 2018-12-07 RX ORDER — RIVAROXABAN 20 MG/1
TABLET, FILM COATED ORAL
Qty: 90 TABLET | Refills: 3 | Status: SHIPPED | OUTPATIENT
Start: 2018-12-07 | End: 2020-01-09

## 2018-12-21 NOTE — TELEPHONE ENCOUNTER
Per pharmacy, rx med  ROSUVASTATIN CALCIUM 20 MG Oral Tab and all strength is in back order and might going to be back hopefully by February 2019, need to know what to issue.

## 2018-12-24 NOTE — TELEPHONE ENCOUNTER
Spoke to Pharmacist Madisyn Tapia, was informed patient has not picked up full prescription for 90 days as medication is on back order. ON 12/11/18 patient got 10 tablets, on 12/18/2018 patient got 4 tablets.  Per pharmacist can send an alternative or even a differ

## 2018-12-26 NOTE — TELEPHONE ENCOUNTER
Called Linda. Informed her of Rx that has been sent and instructions. Patient verbalized her understanding. Patient states while she has an RN on the phone she wanted to relay a message to Dr. Harpreet Terry regarding her eyes.  For the past 3-4 weeks she ha

## 2018-12-28 ENCOUNTER — OFFICE VISIT (OUTPATIENT)
Dept: OPHTHALMOLOGY | Facility: CLINIC | Age: 83
End: 2018-12-28
Payer: MEDICARE

## 2018-12-28 DIAGNOSIS — Z96.1 PSEUDOPHAKIA OF BOTH EYES: ICD-10-CM

## 2018-12-28 DIAGNOSIS — H57.13 EYE PAIN, BILATERAL: Primary | ICD-10-CM

## 2018-12-28 DIAGNOSIS — H04.203 TEARING EYES: ICD-10-CM

## 2018-12-28 DIAGNOSIS — H40.003 GLAUCOMA SUSPECT OF BOTH EYES: ICD-10-CM

## 2018-12-28 PROCEDURE — 99203 OFFICE O/P NEW LOW 30 MIN: CPT | Performed by: OPHTHALMOLOGY

## 2018-12-28 PROCEDURE — G0463 HOSPITAL OUTPT CLINIC VISIT: HCPCS | Performed by: OPHTHALMOLOGY

## 2018-12-28 NOTE — PROGRESS NOTES
Jessenia Carballo is a 80year old female. HPI:     HPI     Patient is complaining of pain (8/10)  and tearing in both eyes for about 3-4 weeks. She was  using Lotemax OU QHS (Rx'd by her Optometrist Dr. Darliss Cheadle) which did not relieve her symtoms.   Dr Darliss Cheadle th tablet Rfl: 0   XARELTO 20 MG Oral Tab TAKE 1 TABLET BY MOUTH EVERY DAY Disp: 90 tablet Rfl: 3   LEVOTHYROXINE SODIUM 25 MCG Oral Tab TAKE 1 TABLET(25 MCG) BY MOUTH BEFORE BREAKFAST Disp: 90 tablet Rfl: 3   Meclizine HCl 25 MG Oral Tab Take 1 tablet (25 mg bilateral  No reason found today why patient has 8/10 pain in her eyes. Tearing eyes  Patient instructed to use lid hygiene twice daily. Use warm compresses twice a day.      Pseudophakia of both eyes  Pt needs to come back in 1 month for a complete d

## 2018-12-28 NOTE — ASSESSMENT & PLAN NOTE
Pt was seen 10 years ago by Dr. Simran Montes. Pt has been going elsewhere since then. Pt is over due for all her glaucoma testing.  Pt ill return in 1 month for a complete exam and do photos then N/A VF, OCT with no MD.

## 2018-12-28 NOTE — PATIENT INSTRUCTIONS
Eye pain, bilateral  No reason found today why patient has 8/10 pain in her eyes. Tearing eyes  Patient instructed to use lid hygiene twice daily. Use warm compresses twice a day.      Pseudophakia of both eyes  Pt needs to come back in 1 month for a

## 2018-12-28 NOTE — ASSESSMENT & PLAN NOTE
Pt needs to come back in 1 month for a complete dilated eye exam. Pt needs a refraction done for distance glasses.

## 2019-01-08 ENCOUNTER — MED REC SCAN ONLY (OUTPATIENT)
Dept: INTERNAL MEDICINE CLINIC | Facility: CLINIC | Age: 84
End: 2019-01-08

## 2019-01-16 ENCOUNTER — PRIOR ORIGINAL RECORDS (OUTPATIENT)
Dept: OTHER | Age: 84
End: 2019-01-16

## 2019-01-16 ENCOUNTER — OFFICE VISIT (OUTPATIENT)
Dept: OPHTHALMOLOGY | Facility: CLINIC | Age: 84
End: 2019-01-16
Payer: MEDICARE

## 2019-01-16 DIAGNOSIS — Z79.899 ON AMIODARONE THERAPY: Chronic | ICD-10-CM

## 2019-01-16 DIAGNOSIS — Z96.1 PSEUDOPHAKIA OF BOTH EYES: ICD-10-CM

## 2019-01-16 DIAGNOSIS — H40.003 GLAUCOMA SUSPECT OF BOTH EYES: Primary | ICD-10-CM

## 2019-01-16 DIAGNOSIS — H35.30 ARMD (AGE-RELATED MACULAR DEGENERATION), BILATERAL: ICD-10-CM

## 2019-01-16 DIAGNOSIS — H04.123 DRY EYES, BILATERAL: ICD-10-CM

## 2019-01-16 PROBLEM — T50.905A DRUG REACTION: Status: ACTIVE | Noted: 2019-01-16

## 2019-01-16 PROCEDURE — 92015 DETERMINE REFRACTIVE STATE: CPT | Performed by: OPHTHALMOLOGY

## 2019-01-16 PROCEDURE — 92014 COMPRE OPH EXAM EST PT 1/>: CPT | Performed by: OPHTHALMOLOGY

## 2019-01-16 PROCEDURE — 92250 FUNDUS PHOTOGRAPHY W/I&R: CPT | Performed by: OPHTHALMOLOGY

## 2019-01-16 NOTE — PROGRESS NOTES
Armani Shafer is a 80year old female. HPI:     HPI     Patient is here for a complete exam and photos. Patient was seen by JIMMIE on 12/28/18 for pain and tearing. Patient still complains of pain (7/10) OU and tearing OU.   She is using warm compresses MOUTH EVERY DAY Disp: 90 tablet Rfl: 3   LEVOTHYROXINE SODIUM 25 MCG Oral Tab TAKE 1 TABLET(25 MCG) BY MOUTH BEFORE BREAKFAST Disp: 90 tablet Rfl: 3   Meclizine HCl 25 MG Oral Tab Take 1 tablet (25 mg total) by mouth 2 (two) times daily as needed.  Disp: 20 Macula RPE hypopigmentation nasal to fovea  RPE hypopigmentation nasal to fovea     Vessels Normal Normal    Periphery Normal Normal            Lacrimal Exam     Schirmers       Right Left     2 1    Anesthesia:  Yes   1/16/19             Refraction     We a healthy diet, sunglasses for eye protection and no smoking to prevent progression of macular degeneration. Dry eyes, bilateral  Schirmer's test today revealed dry eyes.   Patient was instructed to use warm compresses to the eyelids twice a day everyd

## 2019-01-16 NOTE — ASSESSMENT & PLAN NOTE
Schirmer's test today revealed dry eyes. Patient was instructed to use warm compresses to the eyelids twice a day everyday.     Instructions for warm compress use:   Patient should place wash compresses on both eyelids for 5 minutes every morning and every

## 2019-01-16 NOTE — ASSESSMENT & PLAN NOTE
Patient is experiencing many ocular symptoms that may be associated to amiodarone therapy. Patient should discuss with her cardiologist to see if there is an alternate medication.

## 2019-01-17 ENCOUNTER — MYAURORA ACCOUNT LINK (OUTPATIENT)
Dept: OTHER | Age: 84
End: 2019-01-17

## 2019-01-17 ENCOUNTER — PRIOR ORIGINAL RECORDS (OUTPATIENT)
Dept: OTHER | Age: 84
End: 2019-01-17

## 2019-01-20 ENCOUNTER — APPOINTMENT (OUTPATIENT)
Dept: GENERAL RADIOLOGY | Facility: HOSPITAL | Age: 84
End: 2019-01-20
Attending: EMERGENCY MEDICINE
Payer: MEDICARE

## 2019-01-20 ENCOUNTER — HOSPITAL ENCOUNTER (EMERGENCY)
Facility: HOSPITAL | Age: 84
Discharge: HOME | End: 2019-01-20
Attending: EMERGENCY MEDICINE
Payer: MEDICARE

## 2019-01-20 VITALS
HEIGHT: 64 IN | BODY MASS INDEX: 23.9 KG/M2 | DIASTOLIC BLOOD PRESSURE: 76 MMHG | WEIGHT: 140 LBS | TEMPERATURE: 98 F | OXYGEN SATURATION: 96 % | SYSTOLIC BLOOD PRESSURE: 168 MMHG | HEART RATE: 62 BPM | RESPIRATION RATE: 12 BRPM

## 2019-01-20 DIAGNOSIS — N30.01 ACUTE CYSTITIS WITH HEMATURIA: ICD-10-CM

## 2019-01-20 DIAGNOSIS — R53.1 WEAKNESS: Primary | ICD-10-CM

## 2019-01-20 LAB
ANION GAP SERPL CALC-SCNC: 12 MMOL/L (ref 0–18)
BASOPHILS # BLD: 0 K/UL (ref 0–0.2)
BASOPHILS NFR BLD: 1 %
BILIRUB UR QL: NEGATIVE
BNP SERPL-MCNC: 158 PG/ML (ref 0–100)
BUN SERPL-MCNC: 7 MG/DL (ref 8–20)
BUN/CREAT SERPL: 6.7 (ref 10–20)
CALCIUM SERPL-MCNC: 9 MG/DL (ref 8.5–10.5)
CHLORIDE SERPL-SCNC: 104 MMOL/L (ref 95–110)
CO2 SERPL-SCNC: 23 MMOL/L (ref 22–32)
COLOR UR: YELLOW
CREAT SERPL-MCNC: 1.05 MG/DL (ref 0.5–1.5)
EOSINOPHIL # BLD: 0 K/UL (ref 0–0.7)
EOSINOPHIL NFR BLD: 1 %
ERYTHROCYTE [DISTWIDTH] IN BLOOD BY AUTOMATED COUNT: 14.8 % (ref 11–15)
GLUCOSE SERPL-MCNC: 117 MG/DL (ref 70–99)
GLUCOSE UR-MCNC: NEGATIVE MG/DL
HCT VFR BLD AUTO: 43.8 % (ref 35–48)
HGB BLD-MCNC: 14.6 G/DL (ref 12–16)
HGB UR QL STRIP.AUTO: NEGATIVE
HYALINE CASTS #/AREA URNS AUTO: 1 /LPF
KETONES UR-MCNC: NEGATIVE MG/DL
LYMPHOCYTES # BLD: 0.5 K/UL (ref 1–4)
LYMPHOCYTES NFR BLD: 16 %
MCH RBC QN AUTO: 33.2 PG (ref 27–32)
MCHC RBC AUTO-ENTMCNC: 33.4 G/DL (ref 32–37)
MCV RBC AUTO: 99.3 FL (ref 80–100)
MONOCYTES # BLD: 0.3 K/UL (ref 0–1)
MONOCYTES NFR BLD: 9 %
NEUTROPHILS # BLD AUTO: 2.6 K/UL (ref 1.8–7.7)
NEUTROPHILS NFR BLD: 74 %
NITRITE UR QL STRIP.AUTO: NEGATIVE
OSMOLALITY UR CALC.SUM OF ELEC: 287 MOSM/KG (ref 275–295)
PH UR: 8 [PH] (ref 5–8)
PLATELET # BLD AUTO: 176 K/UL (ref 140–400)
PMV BLD AUTO: 9.1 FL (ref 7.4–10.3)
POTASSIUM SERPL-SCNC: 3.4 MMOL/L (ref 3.3–5.1)
PROT UR-MCNC: NEGATIVE MG/DL
RBC # BLD AUTO: 4.41 M/UL (ref 3.7–5.4)
RBC #/AREA URNS AUTO: 3 /HPF
SODIUM SERPL-SCNC: 139 MMOL/L (ref 136–144)
SP GR UR STRIP: 1.01 (ref 1–1.03)
TROPONIN I SERPL-MCNC: 0 NG/ML (ref ?–0.03)
UROBILINOGEN UR STRIP-ACNC: <2
VIT C UR-MCNC: NEGATIVE MG/DL
WBC # BLD AUTO: 3.5 K/UL (ref 4–11)
WBC #/AREA URNS AUTO: 8 /HPF

## 2019-01-20 PROCEDURE — 71046 X-RAY EXAM CHEST 2 VIEWS: CPT | Performed by: EMERGENCY MEDICINE

## 2019-01-20 PROCEDURE — 81001 URINALYSIS AUTO W/SCOPE: CPT | Performed by: EMERGENCY MEDICINE

## 2019-01-20 PROCEDURE — 93005 ELECTROCARDIOGRAM TRACING: CPT

## 2019-01-20 PROCEDURE — 99285 EMERGENCY DEPT VISIT HI MDM: CPT

## 2019-01-20 PROCEDURE — 84484 ASSAY OF TROPONIN QUANT: CPT | Performed by: EMERGENCY MEDICINE

## 2019-01-20 PROCEDURE — 96361 HYDRATE IV INFUSION ADD-ON: CPT

## 2019-01-20 PROCEDURE — 96365 THER/PROPH/DIAG IV INF INIT: CPT

## 2019-01-20 PROCEDURE — 96375 TX/PRO/DX INJ NEW DRUG ADDON: CPT

## 2019-01-20 PROCEDURE — 80048 BASIC METABOLIC PNL TOTAL CA: CPT | Performed by: EMERGENCY MEDICINE

## 2019-01-20 PROCEDURE — 93010 ELECTROCARDIOGRAM REPORT: CPT | Performed by: EMERGENCY MEDICINE

## 2019-01-20 PROCEDURE — 87086 URINE CULTURE/COLONY COUNT: CPT | Performed by: EMERGENCY MEDICINE

## 2019-01-20 PROCEDURE — 85025 COMPLETE CBC W/AUTO DIFF WBC: CPT | Performed by: EMERGENCY MEDICINE

## 2019-01-20 PROCEDURE — 83880 ASSAY OF NATRIURETIC PEPTIDE: CPT | Performed by: EMERGENCY MEDICINE

## 2019-01-20 RX ORDER — ONDANSETRON 4 MG/1
4 TABLET, ORALLY DISINTEGRATING ORAL EVERY 4 HOURS PRN
Qty: 10 TABLET | Refills: 0 | Status: SHIPPED | OUTPATIENT
Start: 2019-01-20 | End: 2019-01-27

## 2019-01-20 RX ORDER — CEPHALEXIN 500 MG/1
500 CAPSULE ORAL 4 TIMES DAILY
Qty: 28 CAPSULE | Refills: 0 | Status: SHIPPED | OUTPATIENT
Start: 2019-01-20 | End: 2019-01-27

## 2019-01-20 RX ORDER — ONDANSETRON 2 MG/ML
4 INJECTION INTRAMUSCULAR; INTRAVENOUS ONCE
Status: COMPLETED | OUTPATIENT
Start: 2019-01-20 | End: 2019-01-20

## 2019-01-20 NOTE — ED NOTES
Discharge instructions reviewed with the patient, all questions answered, pt verbalized understanding.

## 2019-01-20 NOTE — ED PROVIDER NOTES
Patient Seen in: [facility] AND [facility] Emergency Department    History   Patient presents with:  Dizziness (neurologic)    Stated Complaint: DIZZINESS/CHEST PAIN    HPI     69-year-old female with history of atrial fibrillation, hypertension, hyperlipidemia, cough, shortness of breath and wheezing. Cardiovascular: Negative for chest pain. Gastrointestinal: Positive for nausea. Negative for abdominal pain, diarrhea and vomiting. Genitourinary: Negative for dysuria, flank pain and frequency.    Musculoskel No rash noted. She is not diaphoretic. Psychiatric: She has a normal mood and affect. Nursing note and vitals reviewed. ED Course     EKG    Rate, intervals and axes as noted on EKG Report.   Rate: 64  Rhythm: Sinus Rhythm with RBBB  Reading: aissatou >=60    GFR, -American 56 (L) >=60   TROPONIN I    Collection Time: 01/20/19 11:37 AM   Result Value Ref Range    Troponin 0.00 <=0.03 ng/mL   CBC W/ DIFFERENTIAL    Collection Time: 01/20/19 11:37 AM   Result Value Ref Range    WBC 3.5 (L) 4.0 - 11 (CST): Anne Marie Lowery MD on 1/20/2019 at 13:03              EMERGENCY DEPARTMENT COURSE AND TREATMENT:  Patient's condition was stable during Emergency Department evaluation.      84yoF with weakness  - I personally reviewed and interpreted all the ED vitals including fevers, chills, vomiting, pt expresses understanding and agrees to d/c instructions    EMERGENCY DEPARTMENT MEDICAL DECISION MAKING:  After obtaining the patient's history, performing the physical exam and reviewing the diagnostics, multiple init

## 2019-01-24 ENCOUNTER — OFFICE VISIT (OUTPATIENT)
Dept: INTERNAL MEDICINE CLINIC | Facility: CLINIC | Age: 84
End: 2019-01-24
Payer: MEDICARE

## 2019-01-24 VITALS
HEIGHT: 68 IN | DIASTOLIC BLOOD PRESSURE: 73 MMHG | WEIGHT: 172 LBS | TEMPERATURE: 97 F | HEART RATE: 61 BPM | SYSTOLIC BLOOD PRESSURE: 145 MMHG | BODY MASS INDEX: 26.07 KG/M2

## 2019-01-24 DIAGNOSIS — R53.1 WEAKNESS: ICD-10-CM

## 2019-01-24 DIAGNOSIS — H35.30 ARMD (AGE-RELATED MACULAR DEGENERATION), BILATERAL: ICD-10-CM

## 2019-01-24 DIAGNOSIS — I10 ESSENTIAL HYPERTENSION: ICD-10-CM

## 2019-01-24 DIAGNOSIS — Z79.899 ON AMIODARONE THERAPY: ICD-10-CM

## 2019-01-24 DIAGNOSIS — Z91.81 AT HIGH RISK FOR INJURY RELATED TO FALL: ICD-10-CM

## 2019-01-24 DIAGNOSIS — R39.9 UTI SYMPTOMS: Primary | ICD-10-CM

## 2019-01-24 DIAGNOSIS — Z74.09 IMPAIRED FUNCTIONAL MOBILITY, BALANCE, GAIT, AND ENDURANCE: ICD-10-CM

## 2019-01-24 DIAGNOSIS — I48.0 PAROXYSMAL ATRIAL FIBRILLATION (HCC): ICD-10-CM

## 2019-01-24 LAB
BILIRUBIN: NEGATIVE
GLUCOSE (URINE DIPSTICK): NEGATIVE MG/DL
KETONES (URINE DIPSTICK): NEGATIVE MG/DL
MULTISTIX LOT#: NORMAL NUMERIC
NITRITE, URINE: NEGATIVE
PH, URINE: 6 (ref 4.5–8)
SPECIFIC GRAVITY: 1.02 (ref 1–1.03)
URINE-COLOR: YELLOW
UROBILINOGEN,SEMI-QN: 0.2 MG/DL (ref 0–1.9)

## 2019-01-24 PROCEDURE — 81002 URINALYSIS NONAUTO W/O SCOPE: CPT | Performed by: INTERNAL MEDICINE

## 2019-01-24 PROCEDURE — G0463 HOSPITAL OUTPT CLINIC VISIT: HCPCS | Performed by: INTERNAL MEDICINE

## 2019-01-24 PROCEDURE — 99214 OFFICE O/P EST MOD 30 MIN: CPT | Performed by: INTERNAL MEDICINE

## 2019-01-24 RX ORDER — MIRTAZAPINE 15 MG/1
15 TABLET, FILM COATED ORAL NIGHTLY
Qty: 30 TABLET | Refills: 0 | Status: SHIPPED | OUTPATIENT
Start: 2019-01-24 | End: 2019-03-15

## 2019-01-25 PROBLEM — H57.13 EYE PAIN, BILATERAL: Status: RESOLVED | Noted: 2018-12-28 | Resolved: 2019-01-25

## 2019-01-25 PROBLEM — T50.905A DRUG REACTION: Status: RESOLVED | Noted: 2019-01-16 | Resolved: 2019-01-25

## 2019-01-25 NOTE — PROGRESS NOTES
HPI:    Patient ID: Shy Sanchez is a 80year old female.     HPI     ER follow up  Feeling fatigue  Was seen by Cardiology      /73 (BP Location: Right arm, Patient Position: Sitting, Cuff Size: adult)   Pulse 61   Temp 97.4 °F (36.3 °C) (Oral) by mouth every 4 (four) hours as needed for Nausea. Disp: 10 tablet Rfl: 0   cephALEXin (KEFLEX) 500 MG Oral Cap Take 1 capsule (500 mg total) by mouth 4 (four) times daily for 7 days.  Disp: 28 capsule Rfl: 0   Rosuvastatin Calcium 10 MG Oral Tab Take 2 ta Dr. Ziyad Zacarias      Family History   Problem Relation Age of Onset   • Other (Varicose veins) Mother    • Other (Varicose veins) Father    • Diabetes Neg    • Glaucoma Neg    • Macular degeneration Neg       Social History: Social History    Tobacco Use      Sm gait, and endurance  Plan: PHYSICAL THERAPY - INTERNAL        Progressive  Sedentary  Have fallen twice no major injyr   Afraid of med side effect  Will monitor closely    (Z91.81) At high risk for injury related to fall  Plan: PHYSICAL THERAPY - INTERNAL

## 2019-01-26 ENCOUNTER — NURSE ONLY (OUTPATIENT)
Dept: OPHTHALMOLOGY | Facility: CLINIC | Age: 84
End: 2019-01-26
Payer: MEDICARE

## 2019-01-26 DIAGNOSIS — H40.003 GLAUCOMA SUSPECT OF BOTH EYES: ICD-10-CM

## 2019-01-26 PROCEDURE — 92133 CPTRZD OPH DX IMG PST SGM ON: CPT | Performed by: OPHTHALMOLOGY

## 2019-01-26 PROCEDURE — 92083 EXTENDED VISUAL FIELD XM: CPT | Performed by: OPHTHALMOLOGY

## 2019-01-26 PROCEDURE — 76514 ECHO EXAM OF EYE THICKNESS: CPT | Performed by: OPHTHALMOLOGY

## 2019-01-26 NOTE — PROGRESS NOTES
Keyona Dela Cruz is a 80year old female. HPI:     HPI     Patient here for a glaucoma work up with HVF, OCT and Pachy, jair QUISPE      Last edited by Prashant Soto on 1/26/2019 11:13 AM. (History)        Patient History:  Past Medical History:   Diagnosis nightly.  Disp: 180 tablet Rfl: 0   XARELTO 20 MG Oral Tab TAKE 1 TABLET BY MOUTH EVERY DAY Disp: 90 tablet Rfl: 3   LEVOTHYROXINE SODIUM 25 MCG Oral Tab TAKE 1 TABLET(25 MCG) BY MOUTH BEFORE BREAKFAST Disp: 90 tablet Rfl: 3   Meclizine HCl 25 MG Oral Tab T

## 2019-02-04 ENCOUNTER — TELEPHONE (OUTPATIENT)
Dept: OPHTHALMOLOGY | Facility: CLINIC | Age: 84
End: 2019-02-04

## 2019-02-07 ENCOUNTER — OFFICE VISIT (OUTPATIENT)
Dept: CARDIOLOGY CLINIC | Facility: CLINIC | Age: 84
End: 2019-02-07
Payer: MEDICARE

## 2019-02-07 ENCOUNTER — APPOINTMENT (OUTPATIENT)
Dept: LAB | Age: 84
End: 2019-02-07
Attending: INTERNAL MEDICINE
Payer: MEDICARE

## 2019-02-07 VITALS
DIASTOLIC BLOOD PRESSURE: 73 MMHG | BODY MASS INDEX: 26 KG/M2 | SYSTOLIC BLOOD PRESSURE: 158 MMHG | HEART RATE: 64 BPM | WEIGHT: 171 LBS | RESPIRATION RATE: 20 BRPM

## 2019-02-07 DIAGNOSIS — R39.9 UTI SYMPTOMS: ICD-10-CM

## 2019-02-07 DIAGNOSIS — I48.0 PAROXYSMAL ATRIAL FIBRILLATION (HCC): Primary | ICD-10-CM

## 2019-02-07 DIAGNOSIS — I10 ESSENTIAL HYPERTENSION: ICD-10-CM

## 2019-02-07 DIAGNOSIS — I95.1 ORTHOSTASIS: ICD-10-CM

## 2019-02-07 LAB
BACTERIA UR QL AUTO: NEGATIVE /HPF
BILIRUB UR QL: NEGATIVE
CLARITY UR: CLEAR
COLOR UR: YELLOW
GLUCOSE UR-MCNC: NEGATIVE MG/DL
HGB UR QL STRIP.AUTO: NEGATIVE
HYALINE CASTS #/AREA URNS AUTO: 1 /LPF
KETONES UR-MCNC: NEGATIVE MG/DL
NITRITE UR QL STRIP.AUTO: NEGATIVE
PH UR: 6 [PH] (ref 5–8)
PROT UR-MCNC: NEGATIVE MG/DL
RBC #/AREA URNS AUTO: 1 /HPF
SP GR UR STRIP: 1.01 (ref 1–1.03)
UROBILINOGEN UR STRIP-ACNC: <2
VIT C UR-MCNC: NEGATIVE MG/DL
WBC #/AREA URNS AUTO: 3 /HPF

## 2019-02-07 PROCEDURE — 99215 OFFICE O/P EST HI 40 MIN: CPT | Performed by: INTERNAL MEDICINE

## 2019-02-07 PROCEDURE — 81001 URINALYSIS AUTO W/SCOPE: CPT

## 2019-02-07 PROCEDURE — G0463 HOSPITAL OUTPT CLINIC VISIT: HCPCS | Performed by: INTERNAL MEDICINE

## 2019-02-07 RX ORDER — LOSARTAN POTASSIUM 50 MG/1
50 TABLET ORAL DAILY
Qty: 90 TABLET | Refills: 4 | Status: SHIPPED | OUTPATIENT
Start: 2019-02-07 | End: 2019-03-15

## 2019-02-07 NOTE — PATIENT INSTRUCTIONS
- stop amiodarone  - stop carvedilol  - start losartan 50 mg daily  - monitor blood pressures, goal < 140/90  - 48-hour heart monitor  - visit with Dr Demetrius Warner in 2-3 weeks review monitor results, adjust blood pressure medication, and start new antiarrhythmic

## 2019-02-07 NOTE — H&P
[facility], [facility]    Cardiac Electrophysiology Consultation  2019    Name:  Yvonne Forde  : 1934    Date of consultation:   2019    Referring physician: Dr. Arabella Titus    Reason for Consultation:  Atrial fibrillation    History of Present [place] Mother    • Other (Varicose veins) Father    • Diabetes Neg    • Glaucoma Neg    • Macular degeneration Neg      She reports that  has never smoked. she has never used smokeless tobacco. She reports that she does not drink alcohol or use drugs.     Allergpolo Exam:  Vital Signs: /73 (BP Location: Left arm, Patient Position: Sitting, Cuff Size: large)   Pulse 64   Resp 20   Wt 171 lb (77.6 kg)   BMI 26.00 kg/m²   General: Comfortable, well-nourished, in no acute distress   HEENT: Atraumatic.   No scleral ic CHEST (CPT=71020)  COMPARISON: [facility], XR CHEST AP PORTABLE (CPT=71045), 9/16/2018, 18:53. INDICATIONS: Chest pain and dizziness, history of cardiac issues  TECHNIQUE:   Two views.    FINDINGS: CARDIAC/VASC: Stable enlargement of the ca monitor results, adjust blood pressure medications as needed, start a new antiarrhythmic drug. · We discussed the use of Xarelto, and she understands the goals, alternatives, risks of this, and she is comfortable continuing it.   We will monitor blood coun

## 2019-02-08 ENCOUNTER — HOSPITAL ENCOUNTER (OUTPATIENT)
Dept: CV DIAGNOSTICS | Facility: HOSPITAL | Age: 84
Discharge: HOME | End: 2019-02-08
Attending: INTERNAL MEDICINE
Payer: MEDICARE

## 2019-02-08 DIAGNOSIS — I48.0 PAROXYSMAL ATRIAL FIBRILLATION (HCC): ICD-10-CM

## 2019-02-08 DIAGNOSIS — I95.1 ORTHOSTASIS: ICD-10-CM

## 2019-02-08 PROCEDURE — 93227 XTRNL ECG REC<48 HR R&I: CPT | Performed by: INTERNAL MEDICINE

## 2019-02-08 PROCEDURE — 93225 XTRNL ECG REC<48 HRS REC: CPT | Performed by: INTERNAL MEDICINE

## 2019-02-28 ENCOUNTER — OFFICE VISIT (OUTPATIENT)
Dept: CARDIOLOGY CLINIC | Facility: CLINIC | Age: 84
End: 2019-02-28
Payer: MEDICARE

## 2019-02-28 VITALS
DIASTOLIC BLOOD PRESSURE: 84 MMHG | BODY MASS INDEX: 25.61 KG/M2 | HEIGHT: 68 IN | HEART RATE: 61 BPM | WEIGHT: 169 LBS | RESPIRATION RATE: 18 BRPM | SYSTOLIC BLOOD PRESSURE: 144 MMHG

## 2019-02-28 VITALS
BODY MASS INDEX: 25.76 KG/M2 | OXYGEN SATURATION: 99 % | HEART RATE: 74 BPM | DIASTOLIC BLOOD PRESSURE: 80 MMHG | HEIGHT: 68 IN | SYSTOLIC BLOOD PRESSURE: 118 MMHG | WEIGHT: 170 LBS

## 2019-02-28 VITALS
HEIGHT: 68 IN | WEIGHT: 170 LBS | HEART RATE: 58 BPM | SYSTOLIC BLOOD PRESSURE: 136 MMHG | OXYGEN SATURATION: 98 % | BODY MASS INDEX: 25.76 KG/M2 | DIASTOLIC BLOOD PRESSURE: 84 MMHG

## 2019-02-28 VITALS
SYSTOLIC BLOOD PRESSURE: 142 MMHG | DIASTOLIC BLOOD PRESSURE: 86 MMHG | WEIGHT: 170 LBS | HEART RATE: 64 BPM | OXYGEN SATURATION: 98 % | BODY MASS INDEX: 25.76 KG/M2 | HEIGHT: 68 IN

## 2019-02-28 VITALS
DIASTOLIC BLOOD PRESSURE: 72 MMHG | HEART RATE: 70 BPM | RESPIRATION RATE: 16 BRPM | HEIGHT: 68 IN | BODY MASS INDEX: 25.91 KG/M2 | SYSTOLIC BLOOD PRESSURE: 130 MMHG | WEIGHT: 171 LBS

## 2019-02-28 VITALS
SYSTOLIC BLOOD PRESSURE: 96 MMHG | WEIGHT: 179 LBS | HEIGHT: 68 IN | RESPIRATION RATE: 16 BRPM | HEART RATE: 98 BPM | OXYGEN SATURATION: 98 % | BODY MASS INDEX: 27.13 KG/M2 | DIASTOLIC BLOOD PRESSURE: 67 MMHG

## 2019-02-28 DIAGNOSIS — I48.91 ATRIAL FIBRILLATION, UNSPECIFIED TYPE (HCC): ICD-10-CM

## 2019-02-28 DIAGNOSIS — R42 INTERMITTENT LIGHTHEADEDNESS: Primary | ICD-10-CM

## 2019-02-28 DIAGNOSIS — I10 ESSENTIAL HYPERTENSION: ICD-10-CM

## 2019-02-28 PROCEDURE — 99214 OFFICE O/P EST MOD 30 MIN: CPT | Performed by: INTERNAL MEDICINE

## 2019-02-28 PROCEDURE — G0463 HOSPITAL OUTPT CLINIC VISIT: HCPCS | Performed by: INTERNAL MEDICINE

## 2019-02-28 NOTE — PROGRESS NOTES
[facility]    Cardiac Electrophysiology Progress Note  2/28/2019      HPI:   Cherri Ashford is a 80year old returning to discuss test results. She did stop carvedilol and amiodarone, then completed a Holter monitor.   She has fatigue and intermitten distress  HEENT: Atraumatic. No scleral icterus. Mucous membranes are moist.  Neck:  JVP is 12 cmH2O. Carotids normal pulses without bruits. Lungs: Clear to auscultation bilaterally. Cardiac: RRR, nl S1/S2.   Abdomen: Soft, nontender, nondistended  Ex that is observed. · Outside of the above, I recommend repeating a Holter monitor every year. · I have asked her to increase her fluid intake to 2 to 2.5 Liters daily.     Gia Johnston MD  Cardiology/Cardiac EP  2/28/2019

## 2019-02-28 NOTE — PATIENT INSTRUCTIONS
- continue Xarelto  - increase fluid intake to 2 - 2.5 Liters daily  - monitor heart rhythm during future doctor's visits for recurrent atrial fibrillation. If that occurs, visit with Dr. Heath Morillo in office to discuss next steps.   - if no problems before then

## 2019-03-01 VITALS
SYSTOLIC BLOOD PRESSURE: 98 MMHG | DIASTOLIC BLOOD PRESSURE: 58 MMHG | HEART RATE: 60 BPM | WEIGHT: 186 LBS | OXYGEN SATURATION: 98 % | RESPIRATION RATE: 8 BRPM | HEIGHT: 68 IN | BODY MASS INDEX: 28.19 KG/M2

## 2019-03-01 VITALS
OXYGEN SATURATION: 96 % | BODY MASS INDEX: 31.66 KG/M2 | RESPIRATION RATE: 8 BRPM | DIASTOLIC BLOOD PRESSURE: 56 MMHG | WEIGHT: 197 LBS | SYSTOLIC BLOOD PRESSURE: 110 MMHG | HEIGHT: 66 IN | HEART RATE: 52 BPM

## 2019-03-01 VITALS
OXYGEN SATURATION: 100 % | WEIGHT: 197 LBS | BODY MASS INDEX: 29.86 KG/M2 | HEART RATE: 90 BPM | RESPIRATION RATE: 8 BRPM | HEIGHT: 68 IN | DIASTOLIC BLOOD PRESSURE: 66 MMHG | SYSTOLIC BLOOD PRESSURE: 108 MMHG

## 2019-03-15 ENCOUNTER — OFFICE VISIT (OUTPATIENT)
Dept: INTERNAL MEDICINE CLINIC | Facility: CLINIC | Age: 84
End: 2019-03-15
Payer: MEDICARE

## 2019-03-15 ENCOUNTER — APPOINTMENT (OUTPATIENT)
Dept: LAB | Age: 84
End: 2019-03-15
Attending: INTERNAL MEDICINE
Payer: MEDICARE

## 2019-03-15 VITALS
DIASTOLIC BLOOD PRESSURE: 78 MMHG | HEART RATE: 97 BPM | SYSTOLIC BLOOD PRESSURE: 129 MMHG | TEMPERATURE: 97 F | HEIGHT: 68 IN | BODY MASS INDEX: 25.76 KG/M2 | WEIGHT: 170 LBS

## 2019-03-15 DIAGNOSIS — E55.9 VITAMIN D DEFICIENCY: ICD-10-CM

## 2019-03-15 DIAGNOSIS — H53.143 VISUAL DISCOMFORT OF BOTH EYES: ICD-10-CM

## 2019-03-15 DIAGNOSIS — E78.5 HYPERLIPIDEMIA, UNSPECIFIED HYPERLIPIDEMIA TYPE: ICD-10-CM

## 2019-03-15 DIAGNOSIS — I48.0 PAROXYSMAL ATRIAL FIBRILLATION (HCC): ICD-10-CM

## 2019-03-15 DIAGNOSIS — I10 ESSENTIAL HYPERTENSION: Primary | ICD-10-CM

## 2019-03-15 PROCEDURE — G0463 HOSPITAL OUTPT CLINIC VISIT: HCPCS | Performed by: INTERNAL MEDICINE

## 2019-03-15 PROCEDURE — 93005 ELECTROCARDIOGRAM TRACING: CPT

## 2019-03-15 PROCEDURE — 36415 COLL VENOUS BLD VENIPUNCTURE: CPT

## 2019-03-15 PROCEDURE — 93010 ELECTROCARDIOGRAM REPORT: CPT | Performed by: INTERNAL MEDICINE

## 2019-03-15 PROCEDURE — 99214 OFFICE O/P EST MOD 30 MIN: CPT | Performed by: INTERNAL MEDICINE

## 2019-03-15 PROCEDURE — 82306 VITAMIN D 25 HYDROXY: CPT

## 2019-03-15 RX ORDER — LOSARTAN POTASSIUM 50 MG/1
50 TABLET ORAL DAILY
Qty: 90 TABLET | Refills: 4 | Status: SHIPPED | OUTPATIENT
Start: 2019-03-15 | End: 2019-04-02 | Stop reason: DRUGHIGH

## 2019-03-15 RX ORDER — ERGOCALCIFEROL 1.25 MG/1
50000 CAPSULE ORAL WEEKLY
Qty: 12 CAPSULE | Refills: 1 | Status: SHIPPED | OUTPATIENT
Start: 2019-03-15 | End: 2019-11-08 | Stop reason: DRUGHIGH

## 2019-03-22 ENCOUNTER — MED REC SCAN ONLY (OUTPATIENT)
Dept: INTERNAL MEDICINE CLINIC | Facility: CLINIC | Age: 84
End: 2019-03-22

## 2019-03-24 PROBLEM — Z79.899 ON AMIODARONE THERAPY: Chronic | Status: RESOLVED | Noted: 2017-03-27 | Resolved: 2019-03-24

## 2019-03-24 PROBLEM — H04.203 TEARING EYES: Status: RESOLVED | Noted: 2018-12-28 | Resolved: 2019-03-24

## 2019-03-24 PROBLEM — R42 DIZZINESS: Status: RESOLVED | Noted: 2017-04-10 | Resolved: 2019-03-24

## 2019-03-24 NOTE — PROGRESS NOTES
HPI:    Patient ID: Carolyn Lopez is a 80year old female.     HPI    Follow up  HTN  Long standing history of hypertension     sympotms  :        Headache no  dizziness        no                             Blurred vision no  palpitaionsSyncope no  Chest breath and wheezing. Cardiovascular: Negative for chest pain, palpitations and leg swelling. Gastrointestinal: Negative for abdominal pain, blood in stool, constipation, diarrhea, nausea and vomiting.    Genitourinary: Negative for difficulty urinating 12/28/2018   • Vitamin D deficiency    • Vitreous floater 2007      Past Surgical History:   Procedure Laterality Date   • CATARACT EXTRACTION W/  INTRAOCULAR LENS IMPLANT Right 02/09/2005    Dr. Ayaan Patel for near   • CATARACT EXTRACTION W/  I Skin: No rash noted. She is not diaphoretic. No erythema. Nursing note and vitals reviewed.            ASSESSMENT/PLAN:   (I10) Essential hypertension  (primary encounter diagnosis)  Plan: BP is much better  On losartan 100 mg po qd    (H53.143) Visual

## 2019-04-01 ENCOUNTER — TELEPHONE (OUTPATIENT)
Dept: INTERNAL MEDICINE CLINIC | Facility: CLINIC | Age: 84
End: 2019-04-01

## 2019-04-01 NOTE — TELEPHONE ENCOUNTER
Losartan 50mg tabs, take 1 tab (50mg) by mouth daily---fax from [facility] pt said her dose was increased to 2 ts po d at her last office visit. Please advise.

## 2019-04-02 RX ORDER — LOSARTAN POTASSIUM 100 MG/1
100 TABLET ORAL DAILY
Qty: 90 TABLET | Refills: 0 | Status: SHIPPED | OUTPATIENT
Start: 2019-04-02 | End: 2019-07-13

## 2019-04-02 NOTE — TELEPHONE ENCOUNTER
Attempted to call pharmacy to discuss Losartan and find out if patient needs a refill. Pharmacy closed. Will attempt to reach pharmacy tomorrow.

## 2019-04-02 NOTE — TELEPHONE ENCOUNTER
Pharmacy still close, will call later. Spoke with the pharmacist, requesting a new prescription for the new dose of Losartan, will generate the prescription and will electronically sent today.           LOV 3/15/19:    ASSESSMENT/PLAN:   (I10) Essential hy

## 2019-05-01 RX ORDER — ROSUVASTATIN CALCIUM 40 MG/1
40 TABLET, COATED ORAL DAILY
Qty: 90 TABLET | Refills: 3 | Status: SHIPPED | OUTPATIENT
Start: 2019-05-01 | End: 2020-06-30

## 2019-07-13 RX ORDER — LOSARTAN POTASSIUM 100 MG/1
TABLET ORAL
Qty: 90 TABLET | Refills: 1 | Status: SHIPPED | OUTPATIENT
Start: 2019-07-13 | End: 2020-01-09

## 2019-07-14 NOTE — TELEPHONE ENCOUNTER
Refill passed per [facility], [facility] protocol.   Hypertensive Medications  Protocol Criteria:  · Appointment scheduled in the past 6 months or in the next 3 months  · BMP or CMP in the past 12 months  · Creatinine result < 2  Recent Outpatient Visits

## 2019-07-16 ENCOUNTER — APPOINTMENT (OUTPATIENT)
Dept: LAB | Age: 84
End: 2019-07-16
Attending: INTERNAL MEDICINE
Payer: MEDICARE

## 2019-07-16 ENCOUNTER — OFFICE VISIT (OUTPATIENT)
Dept: INTERNAL MEDICINE CLINIC | Facility: CLINIC | Age: 84
End: 2019-07-16
Payer: MEDICARE

## 2019-07-16 ENCOUNTER — TELEPHONE (OUTPATIENT)
Dept: OTHER | Age: 84
End: 2019-07-16

## 2019-07-16 VITALS
HEART RATE: 85 BPM | DIASTOLIC BLOOD PRESSURE: 75 MMHG | SYSTOLIC BLOOD PRESSURE: 130 MMHG | HEIGHT: 68 IN | BODY MASS INDEX: 26.07 KG/M2 | WEIGHT: 172 LBS

## 2019-07-16 DIAGNOSIS — R31.9 HEMATURIA, UNSPECIFIED TYPE: ICD-10-CM

## 2019-07-16 DIAGNOSIS — R73.01 ABNORMAL FASTING GLUCOSE: ICD-10-CM

## 2019-07-16 DIAGNOSIS — I10 ESSENTIAL HYPERTENSION: Primary | ICD-10-CM

## 2019-07-16 DIAGNOSIS — R53.83 FATIGUE, UNSPECIFIED TYPE: ICD-10-CM

## 2019-07-16 DIAGNOSIS — I48.0 PAROXYSMAL ATRIAL FIBRILLATION (HCC): ICD-10-CM

## 2019-07-16 DIAGNOSIS — E03.9 HYPOTHYROIDISM, UNSPECIFIED TYPE: ICD-10-CM

## 2019-07-16 DIAGNOSIS — E55.9 VITAMIN D DEFICIENCY: ICD-10-CM

## 2019-07-16 LAB
ALBUMIN SERPL-MCNC: 3.6 G/DL (ref 3.4–5)
ALBUMIN/GLOB SERPL: 1.2 {RATIO} (ref 1–2)
ALP LIVER SERPL-CCNC: 73 U/L (ref 55–142)
ALT SERPL-CCNC: 42 U/L (ref 13–56)
ANION GAP SERPL CALC-SCNC: 5 MMOL/L (ref 0–18)
AST SERPL-CCNC: 26 U/L (ref 15–37)
BILIRUB SERPL-MCNC: 0.9 MG/DL (ref 0.1–2)
BUN BLD-MCNC: 13 MG/DL (ref 7–18)
BUN/CREAT SERPL: 13.1 (ref 10–20)
CALCIUM BLD-MCNC: 8.6 MG/DL (ref 8.5–10.1)
CHLORIDE SERPL-SCNC: 109 MMOL/L (ref 98–112)
CO2 SERPL-SCNC: 28 MMOL/L (ref 21–32)
CREAT BLD-MCNC: 0.99 MG/DL (ref 0.55–1.02)
DEPRECATED RDW RBC AUTO: 51.4 FL (ref 35.1–46.3)
ERYTHROCYTE [DISTWIDTH] IN BLOOD BY AUTOMATED COUNT: 13.8 % (ref 11–15)
EST. AVERAGE GLUCOSE BLD GHB EST-MCNC: 114 MG/DL (ref 68–126)
GLOBULIN PLAS-MCNC: 3.1 G/DL (ref 2.8–4.4)
GLUCOSE BLD-MCNC: 82 MG/DL (ref 70–99)
HBA1C MFR BLD HPLC: 5.6 % (ref ?–5.7)
HCT VFR BLD AUTO: 43.5 % (ref 35–48)
HGB BLD-MCNC: 14.7 G/DL (ref 12–16)
M PROTEIN MFR SERPL ELPH: 6.7 G/DL (ref 6.4–8.2)
MCH RBC QN AUTO: 34.3 PG (ref 26–34)
MCHC RBC AUTO-ENTMCNC: 33.8 G/DL (ref 31–37)
MCV RBC AUTO: 101.4 FL (ref 80–100)
OSMOLALITY SERPL CALC.SUM OF ELEC: 293 MOSM/KG (ref 275–295)
PATIENT FASTING: NO
PLATELET # BLD AUTO: 229 10(3)UL (ref 150–450)
POTASSIUM SERPL-SCNC: 4.3 MMOL/L (ref 3.5–5.1)
RBC # BLD AUTO: 4.29 X10(6)UL (ref 3.8–5.3)
RBC #/AREA URNS AUTO: 1 /HPF
SODIUM SERPL-SCNC: 142 MMOL/L (ref 136–145)
T4 FREE SERPL-MCNC: 1.1 NG/DL (ref 0.8–1.7)
TSI SER-ACNC: 2.99 MIU/ML (ref 0.36–3.74)
WBC # BLD AUTO: 5.2 X10(3) UL (ref 4–11)
WBC #/AREA URNS AUTO: 4 /HPF

## 2019-07-16 PROCEDURE — 80053 COMPREHEN METABOLIC PANEL: CPT

## 2019-07-16 PROCEDURE — 99214 OFFICE O/P EST MOD 30 MIN: CPT | Performed by: INTERNAL MEDICINE

## 2019-07-16 PROCEDURE — 93010 ELECTROCARDIOGRAM REPORT: CPT | Performed by: INTERNAL MEDICINE

## 2019-07-16 PROCEDURE — 93005 ELECTROCARDIOGRAM TRACING: CPT

## 2019-07-16 PROCEDURE — 85027 COMPLETE CBC AUTOMATED: CPT

## 2019-07-16 PROCEDURE — 83036 HEMOGLOBIN GLYCOSYLATED A1C: CPT

## 2019-07-16 PROCEDURE — 84443 ASSAY THYROID STIM HORMONE: CPT

## 2019-07-16 PROCEDURE — 81015 MICROSCOPIC EXAM OF URINE: CPT

## 2019-07-16 PROCEDURE — G0463 HOSPITAL OUTPT CLINIC VISIT: HCPCS | Performed by: INTERNAL MEDICINE

## 2019-07-16 PROCEDURE — 36415 COLL VENOUS BLD VENIPUNCTURE: CPT

## 2019-07-16 PROCEDURE — 84439 ASSAY OF FREE THYROXINE: CPT

## 2019-07-17 NOTE — PROGRESS NOTES
HPI:    Patient ID: Jackie Myers is a 80year old female.     HPI    Presents with fatigue the last 2 wks   Was doing well prior  Hx of atrial fibrillation  Paroxysmal  Did very well off amiodarone  Denies other symptoms  Lost weight walking regularly daily. Disp: 90 tablet Rfl: 3   ergocalciferol 63973 units Oral Cap Take 1 capsule (50,000 Units total) by mouth once a week.  Disp: 12 capsule Rfl: 1   XARELTO 20 MG Oral Tab TAKE 1 TABLET BY MOUTH EVERY DAY Disp: 90 tablet Rfl: 3   LEVOTHYROXINE SODIUM 25 Left Ear: External ear normal.   Nose: Nose normal.   Mouth/Throat: Oropharynx is clear and moist. No oropharyngeal exudate. Eyes: Pupils are equal, round, and reactive to light. Conjunctivae and EOM are normal. Right eye exhibits no discharge.  Left ey CANCELED: LIPID PANEL        Labs ordered    Patient voiced understanding  and agrees with plan  Follow up will depend on lab findings      Orders Placed This Encounter      Assay, Thyroid Stim Hormone      CBC, Platelet;  No Differential      Comp Metaboli

## 2019-07-17 NOTE — TELEPHONE ENCOUNTER
Pt would like to know blood and urine results. Urine show few bacteria. Pt denies any urinary burning or further concerns. Please advise.

## 2019-07-18 ENCOUNTER — MED REC SCAN ONLY (OUTPATIENT)
Dept: INTERNAL MEDICINE CLINIC | Facility: CLINIC | Age: 84
End: 2019-07-18

## 2019-10-18 ENCOUNTER — TELEPHONE (OUTPATIENT)
Dept: INTERNAL MEDICINE CLINIC | Facility: CLINIC | Age: 84
End: 2019-10-18

## 2019-10-21 RX ORDER — MIRTAZAPINE 15 MG/1
TABLET, FILM COATED ORAL
Qty: 90 TABLET | Refills: 1 | Status: SHIPPED | OUTPATIENT
Start: 2019-10-21

## 2019-10-21 NOTE — TELEPHONE ENCOUNTER
CSS=call and assist for FU OV. No MyChart. LOV 7/16/19: Instructions         Return in about 3 months (around 10/16/2019), or if symptoms worsen or fail to improve. Refill passed per [facility], [facility] protocol.     Refill Protocol Appointment

## 2019-11-05 ENCOUNTER — LAB ENCOUNTER (OUTPATIENT)
Dept: LAB | Age: 84
End: 2019-11-05
Attending: INTERNAL MEDICINE
Payer: MEDICARE

## 2019-11-05 ENCOUNTER — OFFICE VISIT (OUTPATIENT)
Dept: INTERNAL MEDICINE CLINIC | Facility: CLINIC | Age: 84
End: 2019-11-05
Payer: MEDICARE

## 2019-11-05 ENCOUNTER — APPOINTMENT (OUTPATIENT)
Dept: LAB | Age: 84
End: 2019-11-05
Attending: INTERNAL MEDICINE
Payer: MEDICARE

## 2019-11-05 DIAGNOSIS — I10 ESSENTIAL HYPERTENSION: Primary | ICD-10-CM

## 2019-11-05 DIAGNOSIS — H35.30 ARMD (AGE-RELATED MACULAR DEGENERATION), BILATERAL: ICD-10-CM

## 2019-11-05 DIAGNOSIS — R73.01 ABNORMAL FASTING GLUCOSE: ICD-10-CM

## 2019-11-05 DIAGNOSIS — I10 ESSENTIAL HYPERTENSION: ICD-10-CM

## 2019-11-05 DIAGNOSIS — R71.8 ELEVATED MCV: ICD-10-CM

## 2019-11-05 DIAGNOSIS — E03.9 HYPOTHYROIDISM, UNSPECIFIED TYPE: ICD-10-CM

## 2019-11-05 DIAGNOSIS — I48.0 PAROXYSMAL ATRIAL FIBRILLATION (HCC): ICD-10-CM

## 2019-11-05 DIAGNOSIS — Z12.31 VISIT FOR SCREENING MAMMOGRAM: ICD-10-CM

## 2019-11-05 DIAGNOSIS — I10 HYPERTENSION: Primary | ICD-10-CM

## 2019-11-05 DIAGNOSIS — R92.2 DENSE BREAST: ICD-10-CM

## 2019-11-05 DIAGNOSIS — E78.5 HYPERLIPIDEMIA, UNSPECIFIED HYPERLIPIDEMIA TYPE: ICD-10-CM

## 2019-11-05 DIAGNOSIS — E55.9 VITAMIN D DEFICIENCY: ICD-10-CM

## 2019-11-05 DIAGNOSIS — Z78.0 POSTMENOPAUSAL: ICD-10-CM

## 2019-11-05 PROCEDURE — 84443 ASSAY THYROID STIM HORMONE: CPT

## 2019-11-05 PROCEDURE — 80061 LIPID PANEL: CPT

## 2019-11-05 PROCEDURE — 90662 IIV NO PRSV INCREASED AG IM: CPT | Performed by: INTERNAL MEDICINE

## 2019-11-05 PROCEDURE — 82306 VITAMIN D 25 HYDROXY: CPT

## 2019-11-05 PROCEDURE — 82746 ASSAY OF FOLIC ACID SERUM: CPT

## 2019-11-05 PROCEDURE — G0008 ADMIN INFLUENZA VIRUS VAC: HCPCS | Performed by: INTERNAL MEDICINE

## 2019-11-05 PROCEDURE — 82607 VITAMIN B-12: CPT

## 2019-11-05 PROCEDURE — 93010 ELECTROCARDIOGRAM REPORT: CPT | Performed by: INTERNAL MEDICINE

## 2019-11-05 PROCEDURE — 85027 COMPLETE CBC AUTOMATED: CPT

## 2019-11-05 PROCEDURE — 83036 HEMOGLOBIN GLYCOSYLATED A1C: CPT

## 2019-11-05 PROCEDURE — 93005 ELECTROCARDIOGRAM TRACING: CPT

## 2019-11-05 PROCEDURE — 36415 COLL VENOUS BLD VENIPUNCTURE: CPT

## 2019-11-05 PROCEDURE — G0463 HOSPITAL OUTPT CLINIC VISIT: HCPCS | Performed by: INTERNAL MEDICINE

## 2019-11-05 PROCEDURE — 84439 ASSAY OF FREE THYROXINE: CPT

## 2019-11-05 PROCEDURE — 80053 COMPREHEN METABOLIC PANEL: CPT

## 2019-11-05 PROCEDURE — 99214 OFFICE O/P EST MOD 30 MIN: CPT | Performed by: INTERNAL MEDICINE

## 2019-11-05 RX ORDER — LEVOTHYROXINE SODIUM 0.03 MG/1
TABLET ORAL
Qty: 90 TABLET | Refills: 3 | Status: SHIPPED | OUTPATIENT
Start: 2019-11-05 | End: 2020-06-04

## 2019-11-06 VITALS
BODY MASS INDEX: 27.89 KG/M2 | DIASTOLIC BLOOD PRESSURE: 76 MMHG | TEMPERATURE: 97 F | SYSTOLIC BLOOD PRESSURE: 138 MMHG | WEIGHT: 184 LBS | HEIGHT: 68 IN | HEART RATE: 97 BPM

## 2019-11-06 NOTE — PROGRESS NOTES
HPI:    Patient ID: Maryjo Leija is a 80year old female.     HPI    Follow up  Feeling well in general    HTN  Long standing history of hypertension     sympotms  :        Headache no  dizziness        no                             Blurred vision no  p Medications   Medication Sig Dispense Refill   • Levothyroxine Sodium 25 MCG Oral Tab TAKE 1 TABLET(25 MCG) BY MOUTH BEFORE BREAKFAST 90 tablet 3   • MIRTAZAPINE 15 MG Oral Tab TAKE 1 TABLET BY MOUTH NIGHTLY 90 tablet 1   • LOSARTAN 100 MG Oral Tab TAKE 1 use: No      Alcohol/week: 0.0 standard drinks    Drug use: No       PHYSICAL EXAM:    Physical Exam   Constitutional: She appears well-nourished. No distress. HENT:   Head: Normocephalic and atraumatic.    Right Ear: External ear normal.   Left Ear: Exte type  Plan: ASSAY, THYROID STIM HORMONE, FREE T4 (FREE         THYROXINE)        Controlled CPM    (E78.5) Hyperlipidemia, unspecified hyperlipidemia type  Plan: LIPID PANEL, CBC, PLATELET; NO DIFFERENTIAL,         COMP METABOLIC PANEL (14)        Low chol (RHF=02539)  XR DEXA BONE DENSITOMETRY (CPT=18807)        GT#[id number]

## 2019-11-07 ENCOUNTER — APPOINTMENT (OUTPATIENT)
Dept: LAB | Age: 84
End: 2019-11-07
Attending: INTERNAL MEDICINE
Payer: MEDICARE

## 2019-11-07 ENCOUNTER — OFFICE VISIT (OUTPATIENT)
Dept: CARDIOLOGY CLINIC | Facility: CLINIC | Age: 84
End: 2019-11-07
Payer: MEDICARE

## 2019-11-07 VITALS
HEIGHT: 68 IN | BODY MASS INDEX: 28.26 KG/M2 | WEIGHT: 186.5 LBS | SYSTOLIC BLOOD PRESSURE: 130 MMHG | DIASTOLIC BLOOD PRESSURE: 93 MMHG | RESPIRATION RATE: 18 BRPM | HEART RATE: 108 BPM

## 2019-11-07 DIAGNOSIS — I48.21 PERMANENT ATRIAL FIBRILLATION (HCC): Primary | ICD-10-CM

## 2019-11-07 DIAGNOSIS — I10 HYPERTENSION: ICD-10-CM

## 2019-11-07 DIAGNOSIS — Z79.01 CURRENT USE OF LONG TERM ANTICOAGULATION: ICD-10-CM

## 2019-11-07 PROCEDURE — 99214 OFFICE O/P EST MOD 30 MIN: CPT | Performed by: INTERNAL MEDICINE

## 2019-11-07 PROCEDURE — G0463 HOSPITAL OUTPT CLINIC VISIT: HCPCS | Performed by: INTERNAL MEDICINE

## 2019-11-07 PROCEDURE — 81015 MICROSCOPIC EXAM OF URINE: CPT

## 2019-11-07 RX ORDER — ERGOCALCIFEROL 1.25 MG/1
CAPSULE ORAL
Qty: 12 CAPSULE | Refills: 0 | OUTPATIENT
Start: 2019-11-07

## 2019-11-07 NOTE — TELEPHONE ENCOUNTER
The patient was informed. She stated stated she just picked up the Vitamin D 50,000 units weekly for she had a refill   Can she continue to take? Or do you want her to take Vitamin D3 2000 units daily?

## 2019-11-07 NOTE — TELEPHONE ENCOUNTER
Last vitamin D was in normal range, patient can take 2000 units over the counter daily instead of this weekly rx

## 2019-11-07 NOTE — PROGRESS NOTES
[facility]    Cardiac Electrophysiology Progress Note  2/28/2019      HPI:   Edith Carbone is a 80year old returning to discuss her atrial fibrillation. She is now off of any rate or antiarrhythmic agents.   She feels no fatigue, exertional dyspnea bruits. No edema  Abdomen: Soft, nontender, nondistended  Extremities: Mild arthritic changes. Skin: Warm, normal turgor. Neurologic: Alert and oriented x 3. No dysarthria, facial droop, or gross motor deficits.     LABS/DATA:     ECG performed November

## 2019-11-07 NOTE — PATIENT INSTRUCTIONS
-Blood work at least every 6 months to monitor Xarelto  -Follow-up with Dr. Marisol Marks in 1 year, or sooner if new concerns arise

## 2019-11-08 RX ORDER — MULTIVIT-MIN/IRON/FOLIC ACID/K 18-600-40
CAPSULE ORAL
Qty: 30 CAPSULE | Refills: 0 | COMMUNITY
Start: 2019-11-08 | End: 2021-05-14

## 2019-11-08 NOTE — TELEPHONE ENCOUNTER
I called the patient who stated she is out of . She will take the Vitamin D 2000 units daily with understanding.

## 2019-12-29 ENCOUNTER — TELEPHONE (OUTPATIENT)
Dept: INTERNAL MEDICINE CLINIC | Facility: CLINIC | Age: 84
End: 2019-12-29

## 2019-12-29 NOTE — TELEPHONE ENCOUNTER
Patient planned to travel to [place] on 12/12;19  Return 1/1/20  She cancelled  Her trip  Pt felt she is unable to travel due to her chornc medical condition  She travels alone  She is dizzy off pm amd weakness   Afraid falling due unsteadiness  She is try

## 2020-01-09 RX ORDER — RIVAROXABAN 20 MG/1
TABLET, FILM COATED ORAL
Qty: 90 TABLET | Refills: 1 | Status: SHIPPED | OUTPATIENT
Start: 2020-01-09 | End: 2020-07-28

## 2020-01-09 RX ORDER — LOSARTAN POTASSIUM 100 MG/1
TABLET ORAL
Qty: 90 TABLET | Refills: 1 | Status: SHIPPED | OUTPATIENT
Start: 2020-01-09 | End: 2020-07-28

## 2020-01-09 NOTE — TELEPHONE ENCOUNTER
Refill passed per [facility], [facility] protocol.     Hypertensive Medications  Protocol Criteria:  · Appointment scheduled in the past 6 months or in the next 3 months  · BMP or CMP in the past 12 months  · Creatinine result < 2  Recent Outpatient Visits

## 2020-01-09 NOTE — TELEPHONE ENCOUNTER
Review pended refill request as it does not fall under a protocol.   Requested Prescriptions     Pending Prescriptions Disp Refills   • XARELTO 20 MG Oral Tab [Pharmacy Med Name: XARELTO 20MG TABLETS] 90 tablet 1     Sig: TAKE 1 TABLET BY MOUTH EVERY DAY

## 2020-01-30 ENCOUNTER — HOSPITAL ENCOUNTER (OUTPATIENT)
Dept: BONE DENSITY | Facility: HOSPITAL | Age: 85
Discharge: HOME | End: 2020-01-30
Attending: INTERNAL MEDICINE
Payer: MEDICARE

## 2020-01-30 ENCOUNTER — HOSPITAL ENCOUNTER (OUTPATIENT)
Dept: MAMMOGRAPHY | Facility: HOSPITAL | Age: 85
Discharge: HOME | End: 2020-01-30
Attending: INTERNAL MEDICINE
Payer: MEDICARE

## 2020-01-30 DIAGNOSIS — Z78.0 POSTMENOPAUSAL: ICD-10-CM

## 2020-01-30 DIAGNOSIS — Z12.31 VISIT FOR SCREENING MAMMOGRAM: ICD-10-CM

## 2020-01-30 PROCEDURE — 77063 BREAST TOMOSYNTHESIS BI: CPT | Performed by: INTERNAL MEDICINE

## 2020-01-30 PROCEDURE — 77080 DXA BONE DENSITY AXIAL: CPT | Performed by: INTERNAL MEDICINE

## 2020-01-30 PROCEDURE — 77067 SCR MAMMO BI INCL CAD: CPT | Performed by: INTERNAL MEDICINE

## 2020-05-06 RX ORDER — ERGOCALCIFEROL 1.25 MG/1
CAPSULE ORAL
Qty: 12 CAPSULE | Refills: 0 | Status: SHIPPED | OUTPATIENT
Start: 2020-05-06 | End: 2020-10-27

## 2020-06-04 ENCOUNTER — APPOINTMENT (OUTPATIENT)
Dept: LAB | Facility: HOSPITAL | Age: 85
End: 2020-06-04
Attending: INTERNAL MEDICINE
Payer: MEDICARE

## 2020-06-04 DIAGNOSIS — I10 ESSENTIAL HYPERTENSION: ICD-10-CM

## 2020-06-04 DIAGNOSIS — Z74.09 IMPAIRED FUNCTIONAL MOBILITY, BALANCE, GAIT, AND ENDURANCE: ICD-10-CM

## 2020-06-04 DIAGNOSIS — I48.0 PAROXYSMAL ATRIAL FIBRILLATION (HCC): ICD-10-CM

## 2020-06-04 DIAGNOSIS — E55.9 VITAMIN D DEFICIENCY: ICD-10-CM

## 2020-06-04 DIAGNOSIS — E03.9 HYPOTHYROIDISM, UNSPECIFIED TYPE: ICD-10-CM

## 2020-06-04 PROCEDURE — 36415 COLL VENOUS BLD VENIPUNCTURE: CPT

## 2020-06-04 PROCEDURE — 84443 ASSAY THYROID STIM HORMONE: CPT

## 2020-06-04 PROCEDURE — 93010 ELECTROCARDIOGRAM REPORT: CPT | Performed by: INTERNAL MEDICINE

## 2020-06-04 PROCEDURE — 82306 VITAMIN D 25 HYDROXY: CPT

## 2020-06-04 PROCEDURE — 87086 URINE CULTURE/COLONY COUNT: CPT

## 2020-06-04 PROCEDURE — 82746 ASSAY OF FOLIC ACID SERUM: CPT

## 2020-06-04 PROCEDURE — 81015 MICROSCOPIC EXAM OF URINE: CPT

## 2020-06-04 PROCEDURE — 80053 COMPREHEN METABOLIC PANEL: CPT

## 2020-06-04 PROCEDURE — 93005 ELECTROCARDIOGRAM TRACING: CPT

## 2020-06-04 PROCEDURE — 85027 COMPLETE CBC AUTOMATED: CPT

## 2020-06-04 PROCEDURE — 84439 ASSAY OF FREE THYROXINE: CPT

## 2020-06-04 PROCEDURE — 82607 VITAMIN B-12: CPT

## 2020-06-25 ENCOUNTER — TELEPHONE (OUTPATIENT)
Dept: CARDIOLOGY CLINIC | Facility: CLINIC | Age: 85
End: 2020-06-25

## 2020-06-25 DIAGNOSIS — I48.20 CHRONIC ATRIAL FIBRILLATION (HCC): Primary | ICD-10-CM

## 2020-06-29 ENCOUNTER — HOSPITAL ENCOUNTER (OUTPATIENT)
Dept: CV DIAGNOSTICS | Facility: HOSPITAL | Age: 85
Discharge: HOME | End: 2020-06-29
Attending: INTERNAL MEDICINE
Payer: MEDICARE

## 2020-06-29 DIAGNOSIS — I48.20 CHRONIC ATRIAL FIBRILLATION (HCC): ICD-10-CM

## 2020-06-29 PROCEDURE — 93227 XTRNL ECG REC<48 HR R&I: CPT | Performed by: INTERNAL MEDICINE

## 2020-06-29 PROCEDURE — 93225 XTRNL ECG REC<48 HRS REC: CPT | Performed by: INTERNAL MEDICINE

## 2020-06-30 RX ORDER — ROSUVASTATIN CALCIUM 40 MG/1
TABLET, COATED ORAL
Qty: 90 TABLET | Refills: 3 | Status: SHIPPED | OUTPATIENT
Start: 2020-06-30 | End: 2021-07-12

## 2020-07-09 ENCOUNTER — APPOINTMENT (OUTPATIENT)
Dept: LAB | Age: 85
End: 2020-07-09
Attending: INTERNAL MEDICINE
Payer: MEDICARE

## 2020-07-09 ENCOUNTER — OFFICE VISIT (OUTPATIENT)
Dept: CARDIOLOGY CLINIC | Facility: CLINIC | Age: 85
End: 2020-07-09
Payer: MEDICARE

## 2020-07-09 VITALS
SYSTOLIC BLOOD PRESSURE: 131 MMHG | TEMPERATURE: 99 F | RESPIRATION RATE: 19 BRPM | WEIGHT: 185.13 LBS | HEART RATE: 98 BPM | BODY MASS INDEX: 28.06 KG/M2 | DIASTOLIC BLOOD PRESSURE: 86 MMHG | HEIGHT: 68 IN

## 2020-07-09 DIAGNOSIS — I48.21 PERMANENT ATRIAL FIBRILLATION (HCC): Primary | ICD-10-CM

## 2020-07-09 DIAGNOSIS — I48.21 PERMANENT ATRIAL FIBRILLATION (HCC): ICD-10-CM

## 2020-07-09 DIAGNOSIS — Z79.01 CURRENT USE OF LONG TERM ANTICOAGULATION: ICD-10-CM

## 2020-07-09 LAB
ALBUMIN SERPL-MCNC: 3.6 G/DL (ref 3.4–5)
ALBUMIN/GLOB SERPL: 1.2 {RATIO} (ref 1–2)
ALP LIVER SERPL-CCNC: 72 U/L (ref 55–142)
ALT SERPL-CCNC: 49 U/L (ref 13–56)
ANION GAP SERPL CALC-SCNC: 6 MMOL/L (ref 0–18)
AST SERPL-CCNC: 26 U/L (ref 15–37)
BILIRUB SERPL-MCNC: 1.1 MG/DL (ref 0.1–2)
BUN BLD-MCNC: 12 MG/DL (ref 7–18)
BUN/CREAT SERPL: 14.1 (ref 10–20)
CALCIUM BLD-MCNC: 9.2 MG/DL (ref 8.5–10.1)
CHLORIDE SERPL-SCNC: 109 MMOL/L (ref 98–112)
CO2 SERPL-SCNC: 27 MMOL/L (ref 21–32)
CREAT BLD-MCNC: 0.85 MG/DL (ref 0.55–1.02)
DEPRECATED RDW RBC AUTO: 50 FL (ref 35.1–46.3)
ERYTHROCYTE [DISTWIDTH] IN BLOOD BY AUTOMATED COUNT: 13.7 % (ref 11–15)
GLOBULIN PLAS-MCNC: 2.9 G/DL (ref 2.8–4.4)
GLUCOSE BLD-MCNC: 127 MG/DL (ref 70–99)
HCT VFR BLD AUTO: 42.3 % (ref 35–48)
HGB BLD-MCNC: 14.2 G/DL (ref 12–16)
M PROTEIN MFR SERPL ELPH: 6.5 G/DL (ref 6.4–8.2)
MCH RBC QN AUTO: 33.1 PG (ref 26–34)
MCHC RBC AUTO-ENTMCNC: 33.6 G/DL (ref 31–37)
MCV RBC AUTO: 98.6 FL (ref 80–100)
OSMOLALITY SERPL CALC.SUM OF ELEC: 295 MOSM/KG (ref 275–295)
PATIENT FASTING Y/N/NP: NO
PLATELET # BLD AUTO: 213 10(3)UL (ref 150–450)
POTASSIUM SERPL-SCNC: 4.3 MMOL/L (ref 3.5–5.1)
RBC # BLD AUTO: 4.29 X10(6)UL (ref 3.8–5.3)
SODIUM SERPL-SCNC: 142 MMOL/L (ref 136–145)
WBC # BLD AUTO: 5.5 X10(3) UL (ref 4–11)

## 2020-07-09 PROCEDURE — 80053 COMPREHEN METABOLIC PANEL: CPT

## 2020-07-09 PROCEDURE — 99214 OFFICE O/P EST MOD 30 MIN: CPT | Performed by: INTERNAL MEDICINE

## 2020-07-09 PROCEDURE — G0463 HOSPITAL OUTPT CLINIC VISIT: HCPCS | Performed by: INTERNAL MEDICINE

## 2020-07-09 PROCEDURE — 85027 COMPLETE CBC AUTOMATED: CPT

## 2020-07-09 PROCEDURE — 36415 COLL VENOUS BLD VENIPUNCTURE: CPT

## 2020-07-09 NOTE — PROGRESS NOTES
[facility]    Cardiac Electrophysiology Progress Note  7/9/2020      HPI:   Jhonatan Cobian is a 80year old returning to discuss her atrial fibrillation. She has fatigue.   He feels fast palpitations with activity sometimes, but no other worrisome or or paresthesias    PHYSICAL EXAM:   Vital Signs: /86 (BP Location: Right arm, Patient Position: Sitting, Cuff Size: large)   Pulse 98   Temp 98.6 °F (37 °C) (Tympanic)   Resp 19   Ht 5' 8\" (1.727 m)   Wt 185 lb 2 oz (84 kg)   BMI 28.15 kg/m²   Gener Matt 96 11/05/2019    MIGUE 97 07/15/2016       ASSESSMENT / PLAN:   1. Permanent atrial fibrillation  (primary encounter diagnosis)  2.   Current use of long term anticoagulation     The patient is a 28-year-old with a long history of atrial fibri

## 2020-07-28 RX ORDER — LOSARTAN POTASSIUM 100 MG/1
TABLET ORAL
Qty: 90 TABLET | Refills: 1 | Status: SHIPPED | OUTPATIENT
Start: 2020-07-28 | End: 2021-01-28

## 2020-07-28 RX ORDER — RIVAROXABAN 20 MG/1
TABLET, FILM COATED ORAL
Qty: 90 TABLET | Refills: 1 | Status: SHIPPED | OUTPATIENT
Start: 2020-07-28 | End: 2021-01-28

## 2020-10-27 RX ORDER — ERGOCALCIFEROL 1.25 MG/1
CAPSULE ORAL
Qty: 12 CAPSULE | Refills: 0 | Status: SHIPPED | OUTPATIENT
Start: 2020-10-27 | End: 2021-04-05

## 2021-01-18 RX ORDER — LEVOTHYROXINE SODIUM 0.05 MG/1
TABLET ORAL
Qty: 90 TABLET | Refills: 1 | Status: SHIPPED | OUTPATIENT
Start: 2021-01-18 | End: 2021-07-20

## 2021-01-18 NOTE — TELEPHONE ENCOUNTER
•  Levothyroxine Sodium 50 MCG Oral Tab, TAKE 1 TABLET(25 MCG) BY MOUTH BEFORE BREAKFAST, Disp: 90 tablet, Rfl: 1

## 2021-01-28 RX ORDER — LOSARTAN POTASSIUM 100 MG/1
TABLET ORAL
Qty: 90 TABLET | Refills: 1 | Status: SHIPPED | OUTPATIENT
Start: 2021-01-28 | End: 2021-08-09

## 2021-01-28 RX ORDER — RIVAROXABAN 20 MG/1
TABLET, FILM COATED ORAL
Qty: 90 TABLET | Refills: 1 | Status: SHIPPED | OUTPATIENT
Start: 2021-01-28 | End: 2021-08-09

## 2021-03-12 DIAGNOSIS — Z23 NEED FOR VACCINATION: ICD-10-CM

## 2021-03-15 ENCOUNTER — IMMUNIZATION (OUTPATIENT)
Dept: LAB | Age: 86
End: 2021-03-15

## 2021-03-15 DIAGNOSIS — Z23 NEED FOR VACCINATION: Primary | ICD-10-CM

## 2021-03-15 PROCEDURE — 91301 COVID-19 MODERNA VACCINE: CPT | Performed by: NURSE PRACTITIONER

## 2021-03-15 PROCEDURE — 0011A COVID-19 MODERNA VACCINE: CPT | Performed by: NURSE PRACTITIONER

## 2021-04-05 RX ORDER — ERGOCALCIFEROL 1.25 MG/1
CAPSULE ORAL
Qty: 12 CAPSULE | Refills: 0 | Status: SHIPPED | OUTPATIENT
Start: 2021-04-05 | End: 2021-09-11

## 2021-04-12 ENCOUNTER — IMMUNIZATION (OUTPATIENT)
Dept: LAB | Age: 86
End: 2021-04-12
Attending: NURSE PRACTITIONER

## 2021-04-12 DIAGNOSIS — Z23 NEED FOR VACCINATION: Primary | ICD-10-CM

## 2021-04-12 PROCEDURE — 0012A COVID-19 MODERNA VACCINE: CPT

## 2021-04-12 PROCEDURE — 91301 COVID-19 MODERNA VACCINE: CPT

## 2021-04-16 ENCOUNTER — TELEPHONE (OUTPATIENT)
Dept: INTERNAL MEDICINE CLINIC | Facility: CLINIC | Age: 86
End: 2021-04-16

## 2021-04-16 NOTE — TELEPHONE ENCOUNTER
Called pt name and date of birth verified informed pt of appt change per pt request, confirmed by pt , per pt verbalized understanding no further questions asked

## 2021-04-16 NOTE — TELEPHONE ENCOUNTER
Patient called to see if her px appt got rescheduled and I see that the time was changed but not from the date that the patient originally said that she had.  So patient wants Dr. Eliezer Peters nurse to reach out and confirm that her rescheduled date for her px is 11/1

## 2021-05-10 ENCOUNTER — TELEPHONE (OUTPATIENT)
Dept: INTERNAL MEDICINE CLINIC | Facility: CLINIC | Age: 86
End: 2021-05-10

## 2021-05-11 NOTE — TELEPHONE ENCOUNTER
Pt scheduled to see me at 11 AM for MEDICARE ANNUAL AT 11 AM  I AM ONLY DOING TELE VIISITS IN am  PLEASE SCHED HER BET 1 TO 2 PM USE 24 HOUR RES

## 2021-05-11 NOTE — TELEPHONE ENCOUNTER
1st attempt/left voice mail message for pt to call back. When the patient returns the call please see message below and schedule appt to a later time.

## 2021-05-14 ENCOUNTER — OFFICE VISIT (OUTPATIENT)
Dept: INTERNAL MEDICINE CLINIC | Facility: CLINIC | Age: 86
End: 2021-05-14
Payer: MEDICARE

## 2021-05-14 VITALS
DIASTOLIC BLOOD PRESSURE: 72 MMHG | HEIGHT: 68 IN | BODY MASS INDEX: 27.28 KG/M2 | WEIGHT: 180 LBS | HEART RATE: 96 BPM | SYSTOLIC BLOOD PRESSURE: 116 MMHG

## 2021-05-14 DIAGNOSIS — H40.003 GLAUCOMA SUSPECT OF BOTH EYES: ICD-10-CM

## 2021-05-14 DIAGNOSIS — Z00.00 MEDICARE ANNUAL WELLNESS VISIT, SUBSEQUENT: Primary | ICD-10-CM

## 2021-05-14 DIAGNOSIS — H35.30 ARMD (AGE-RELATED MACULAR DEGENERATION), BILATERAL: ICD-10-CM

## 2021-05-14 DIAGNOSIS — R53.83 FATIGUE, UNSPECIFIED TYPE: ICD-10-CM

## 2021-05-14 DIAGNOSIS — E03.9 HYPOTHYROIDISM, UNSPECIFIED TYPE: ICD-10-CM

## 2021-05-14 DIAGNOSIS — Z12.31 VISIT FOR SCREENING MAMMOGRAM: ICD-10-CM

## 2021-05-14 DIAGNOSIS — I48.21 PERMANENT ATRIAL FIBRILLATION (HCC): ICD-10-CM

## 2021-05-14 DIAGNOSIS — Z00.00 ENCOUNTER FOR ANNUAL HEALTH EXAMINATION: ICD-10-CM

## 2021-05-14 DIAGNOSIS — E78.5 HYPERLIPIDEMIA, UNSPECIFIED HYPERLIPIDEMIA TYPE: ICD-10-CM

## 2021-05-14 DIAGNOSIS — Z79.01 CURRENT USE OF LONG TERM ANTICOAGULATION: ICD-10-CM

## 2021-05-14 DIAGNOSIS — R73.01 ABNORMAL FASTING GLUCOSE: ICD-10-CM

## 2021-05-14 DIAGNOSIS — E55.9 VITAMIN D DEFICIENCY: ICD-10-CM

## 2021-05-14 DIAGNOSIS — I10 ESSENTIAL HYPERTENSION: ICD-10-CM

## 2021-05-14 PROCEDURE — G0439 PPPS, SUBSEQ VISIT: HCPCS | Performed by: INTERNAL MEDICINE

## 2021-05-14 NOTE — PROGRESS NOTES
HPI:   Keyona Dela Cruz is a 80year old female who presents for a Medicare Subsequent Annual Wellness visit (Pt already had Initial Annual Wellness).     Her last annual assessment has been over 1 year: Annual Physical Never done         Fall/Risk Assessmen 06/04/2020    CREATSERUM 0.85 07/09/2020     (H) 07/09/2020        CBC  (most recent labs)   Lab Results   Component Value Date    WBC 5.5 07/09/2020    HGB 14.2 07/09/2020    .0 07/09/2020        ALLERGIES:   She has No Known Allergies.     C sinus pressure and sore throat. Eyes: Negative for pain, discharge and redness. Respiratory: Negative for cough, chest tightness, shortness of breath and wheezing. Cardiovascular: Negative for chest pain, palpitations and leg swelling.    Filomena Wakefield the speech of women and children: No I have trouble understanding the speaker in a large room such as at a meeting or place of Oriental orthodox: No   Many people I talk to seem to mumble (or don't speak clearly): No People get annoyed because I misunderstand what t Mental Status: She is alert.          Vaccination History     Immunization History   Administered Date(s) Administered   • Covid-19 Vaccine Moderna 100 mcg/0.5 ml 03/15/2021, 04/12/2021   • FLU VAC High Dose 72 YRS & Older PRSV Free (97844) 11/20/2014, 11/ .Breast exam.  Bilateral symmetric  No discrete palpable masses or tenderness  No nipple discharge  And no axillary adenopathy.    Self breast exam advised    (H35.30) ARMD (age-related macular degeneration), bilateral  Plan: moniotor  Ongoing followu p wit results found for this or any previous visit. No flowsheet data found. Fecal Occult Blood Annually No results found for: FOB No flowsheet data found.     Glaucoma Screening      Ophthalmology Visit Annually: Diabetics, FHx Glaucoma, AA>50, > 65 pharmacy  prescription benefits      SPECIFIC DISEASE MONITORING Internal Lab or Procedure External Lab or Procedure      Annual Monitoring of Persistent     Medications (ACE/ARB, digoxin diuretics, anticonvulsants.)    Potassium  Annually Potassium (mmol/

## 2021-05-14 NOTE — PATIENT INSTRUCTIONS
Linda Cleveland's SCREENING SCHEDULE   Tests on this list are recommended by your physician but may not be covered, or covered at this frequency, by your insurer. Please check with your insurance carrier before scheduling to verify coverage.    PREVENTATI 10 years- more often if abnormal There are no preventive care reminders to display for this patient. Update Health Maintenance if applicable    Flex Sigmoidoscopy Screen  Covered every 5 years No results found for this or any previous visit.  No flowsheet d Orders placed or performed in visit on 11/20/14   • FLU VACC PRSV FREE INC ANTIG    Please get every year    Pneumococcal 13 (Prevnar)  Covered Once after 65 Orders placed or performed in visit on 10/03/17   • PNEUMOCOCCAL VACC, 13 ALEXANDRA IM    Please get o

## 2021-05-16 PROBLEM — Z96.1 PSEUDOPHAKIA OF BOTH EYES: Status: RESOLVED | Noted: 2018-12-28 | Resolved: 2021-05-16

## 2021-05-16 PROBLEM — H04.123 DRY EYES, BILATERAL: Status: RESOLVED | Noted: 2019-01-16 | Resolved: 2021-05-16

## 2021-05-18 ENCOUNTER — LAB ENCOUNTER (OUTPATIENT)
Dept: LAB | Age: 86
End: 2021-05-18
Attending: INTERNAL MEDICINE
Payer: MEDICARE

## 2021-05-18 DIAGNOSIS — R53.83 FATIGUE, UNSPECIFIED TYPE: ICD-10-CM

## 2021-05-18 DIAGNOSIS — E78.5 HYPERLIPIDEMIA, UNSPECIFIED HYPERLIPIDEMIA TYPE: ICD-10-CM

## 2021-05-18 DIAGNOSIS — R73.01 ABNORMAL FASTING GLUCOSE: ICD-10-CM

## 2021-05-18 DIAGNOSIS — E55.9 VITAMIN D DEFICIENCY: ICD-10-CM

## 2021-05-18 PROCEDURE — 36415 COLL VENOUS BLD VENIPUNCTURE: CPT

## 2021-05-18 PROCEDURE — 80061 LIPID PANEL: CPT

## 2021-05-18 PROCEDURE — 81015 MICROSCOPIC EXAM OF URINE: CPT

## 2021-05-18 PROCEDURE — 82306 VITAMIN D 25 HYDROXY: CPT

## 2021-05-18 PROCEDURE — 82607 VITAMIN B-12: CPT

## 2021-05-18 PROCEDURE — 84439 ASSAY OF FREE THYROXINE: CPT

## 2021-05-18 PROCEDURE — 85027 COMPLETE CBC AUTOMATED: CPT

## 2021-05-18 PROCEDURE — 82746 ASSAY OF FOLIC ACID SERUM: CPT

## 2021-05-18 PROCEDURE — 87086 URINE CULTURE/COLONY COUNT: CPT

## 2021-05-18 PROCEDURE — 84443 ASSAY THYROID STIM HORMONE: CPT

## 2021-05-18 PROCEDURE — 80053 COMPREHEN METABOLIC PANEL: CPT

## 2021-05-18 PROCEDURE — 83036 HEMOGLOBIN GLYCOSYLATED A1C: CPT

## 2021-06-23 ENCOUNTER — APPOINTMENT (OUTPATIENT)
Dept: CT IMAGING | Facility: HOSPITAL | Age: 86
End: 2021-06-23
Payer: MEDICARE

## 2021-06-23 ENCOUNTER — HOSPITAL ENCOUNTER (EMERGENCY)
Facility: HOSPITAL | Age: 86
Discharge: HOME | End: 2021-06-23
Payer: MEDICARE

## 2021-06-23 VITALS
DIASTOLIC BLOOD PRESSURE: 99 MMHG | RESPIRATION RATE: 19 BRPM | WEIGHT: 180.75 LBS | OXYGEN SATURATION: 98 % | SYSTOLIC BLOOD PRESSURE: 126 MMHG | BODY MASS INDEX: 27 KG/M2 | HEART RATE: 96 BPM | TEMPERATURE: 98 F

## 2021-06-23 DIAGNOSIS — R42 DIZZINESS: Primary | ICD-10-CM

## 2021-06-23 PROCEDURE — 85025 COMPLETE CBC W/AUTO DIFF WBC: CPT

## 2021-06-23 PROCEDURE — 99285 EMERGENCY DEPT VISIT HI MDM: CPT

## 2021-06-23 PROCEDURE — 84484 ASSAY OF TROPONIN QUANT: CPT

## 2021-06-23 PROCEDURE — 93010 ELECTROCARDIOGRAM REPORT: CPT | Performed by: INTERNAL MEDICINE

## 2021-06-23 PROCEDURE — 93005 ELECTROCARDIOGRAM TRACING: CPT

## 2021-06-23 PROCEDURE — 96360 HYDRATION IV INFUSION INIT: CPT

## 2021-06-23 PROCEDURE — 80048 BASIC METABOLIC PNL TOTAL CA: CPT

## 2021-06-23 PROCEDURE — 70450 CT HEAD/BRAIN W/O DYE: CPT

## 2021-06-23 RX ORDER — MECLIZINE HYDROCHLORIDE 25 MG/1
25 TABLET ORAL 3 TIMES DAILY PRN
Qty: 20 TABLET | Refills: 0 | Status: SHIPPED | OUTPATIENT
Start: 2021-06-23

## 2021-06-23 RX ORDER — MECLIZINE HYDROCHLORIDE 25 MG/1
25 TABLET ORAL ONCE
Status: COMPLETED | OUTPATIENT
Start: 2021-06-23 | End: 2021-06-23

## 2021-06-23 NOTE — ED INITIAL ASSESSMENT (HPI)
Pt to ED with c/o dizziness upon awakening this morning. Pt denies headache. Pt denies fall or head trauma. Pt denies visual changes. Pt is alert and oriented x4. Pt ambulating by self with steady gait. Pt on Xarelto. Pt denies N/V/D.  Pt denies chest pain

## 2021-06-23 NOTE — ED PROVIDER NOTES
Patient Seen in: [facility] AND [facility] Emergency Department      History   Patient presents with:  Dizziness    Stated Complaint: dizziness     HPI/Subjective:   HPI    63-year-old female with history of atrial fibrillation, on Xarelto, hypertension, hyperli Review of Systems   Constitutional: Negative for fever. HENT: Negative for congestion. Eyes: Negative for visual disturbance. Respiratory: Negative for shortness of breath. Cardiovascular: Negative for chest pain.    Gastrointestinal: Colleen Aus noted on EKG Report. Rate: 94  Rhythm: Atrial Fibrillation  Reading: normal for rate, abnormal for rhythm, no acute ST changes      Cardiac Monitor:    Patient placed on the cardiac monitor and a rhythm strip obtained with the indication of dizziness.   Marko Carlson %    Lymphocyte % 19.9 %    Monocyte % 8.3 %    Eosinophil % 1.0 %    Basophil % 0.6 %    Immature Granulocyte % 0.4 %       Imaging Results Available and Reviewed by me while in ED:  CT BRAIN OR HEAD (36732)    Result Date: 6/23/2021  CONCLUSION:  Mild ch pm    Follow-up:  Ann Marie [address]  [phone]    Schedule an appointment as soon as possible for a visit in 2 days  As needed    We recommend that you schedule follow up care with a primary care provider within

## 2021-06-28 ENCOUNTER — NURSE TRIAGE (OUTPATIENT)
Dept: INTERNAL MEDICINE CLINIC | Facility: CLINIC | Age: 86
End: 2021-06-28

## 2021-06-28 NOTE — TELEPHONE ENCOUNTER
Action Requested: Summary for Provider     []  Critical Lab, Recommendations Needed  [] Need Additional Advice  []   FYI    []   Need Orders  [] Need Medications Sent to Pharmacy  [x]  Other     SUMMARY: Verified name and .   Patient reports pain to bildavid
Not applicable

## 2021-07-02 ENCOUNTER — OFFICE VISIT (OUTPATIENT)
Dept: INTERNAL MEDICINE CLINIC | Facility: CLINIC | Age: 86
End: 2021-07-02
Payer: MEDICARE

## 2021-07-02 ENCOUNTER — HOSPITAL ENCOUNTER (OUTPATIENT)
Dept: GENERAL RADIOLOGY | Facility: HOSPITAL | Age: 86
Discharge: HOME | End: 2021-07-02
Attending: INTERNAL MEDICINE
Payer: MEDICARE

## 2021-07-02 VITALS
DIASTOLIC BLOOD PRESSURE: 73 MMHG | BODY MASS INDEX: 27.4 KG/M2 | HEART RATE: 85 BPM | SYSTOLIC BLOOD PRESSURE: 107 MMHG | HEIGHT: 68 IN | WEIGHT: 180.81 LBS

## 2021-07-02 DIAGNOSIS — M19.012 PRIMARY OSTEOARTHRITIS OF BOTH SHOULDERS: Primary | ICD-10-CM

## 2021-07-02 DIAGNOSIS — M75.40 IMPINGEMENT SYNDROME OF SHOULDER REGION, UNSPECIFIED LATERALITY: ICD-10-CM

## 2021-07-02 DIAGNOSIS — E78.5 HYPERLIPIDEMIA, UNSPECIFIED HYPERLIPIDEMIA TYPE: ICD-10-CM

## 2021-07-02 DIAGNOSIS — M19.011 PRIMARY OSTEOARTHRITIS OF BOTH SHOULDERS: Primary | ICD-10-CM

## 2021-07-02 DIAGNOSIS — I48.21 PERMANENT ATRIAL FIBRILLATION (HCC): ICD-10-CM

## 2021-07-02 DIAGNOSIS — I10 ESSENTIAL HYPERTENSION: ICD-10-CM

## 2021-07-02 PROCEDURE — 73030 X-RAY EXAM OF SHOULDER: CPT | Performed by: INTERNAL MEDICINE

## 2021-07-02 PROCEDURE — 99214 OFFICE O/P EST MOD 30 MIN: CPT | Performed by: INTERNAL MEDICINE

## 2021-07-02 RX ORDER — METHYLPREDNISOLONE 4 MG/1
TABLET ORAL
Qty: 1 EACH | Refills: 0 | Status: SHIPPED | OUTPATIENT
Start: 2021-07-02 | End: 2021-07-26 | Stop reason: ALTCHOICE

## 2021-07-04 NOTE — PROGRESS NOTES
HPI:    Patient ID: Chantal Mayer is a 80year old female.     HPI  Currently denies dizzines  Per pt having shoulder arm pain when she wakes up int he morning which gradually improves during the day  ER follow up  80year-old female with history of atria 100 MG Oral Tab TAKE 1 TABLET(100 MG) BY MOUTH DAILY 90 tablet 1   • Levothyroxine Sodium 50 MCG Oral Tab TAKE 1 TABLET(25 MCG) BY MOUTH BEFORE BREAKFAST 90 tablet 1   • ROSUVASTATIN CALCIUM 40 MG Oral Tab TAKE 1 TABLET(40 MG) BY MOUTH DAILY 90 tablet 3 Smokeless tobacco: Never Used    Alcohol use: No      Alcohol/week: 0.0 standard drinks    Drug use: No       PHYSICAL EXAM:    Physical Exam  Constitutional:       Appearance: She is not ill-appearing. Cardiovascular:      Rate and Rhythm: Normal rate. 98 - 112 mmol/L 108 109    Carbon Dioxide, Total      21.0 - 32.0 mmol/L 26.0 27.0    ANION GAP      0 - 18 mmol/L 7 6    BUN      7 - 18 mg/dL 10 14    CREATININE      0.55 - 1.02 mg/dL 0.80 0.89    BUN/CREAT Ratio      10.0 - 20.0 12.5 15.7    CALCIUM No significant changes have occurred   Electronically signed on 06/23/2021 at 16:06 by MICHELLE vAila Specimen Collected: 06/23/21  1:30 PM Last Resulted: 06/23/21  1:36 PM          Impression 7/2/21   CONCLUSION: xray right shoulder  1.  Mild AC joint

## 2021-07-12 RX ORDER — ROSUVASTATIN CALCIUM 40 MG/1
TABLET, COATED ORAL
Qty: 90 TABLET | Refills: 3 | Status: SHIPPED | OUTPATIENT
Start: 2021-07-12

## 2021-07-12 RX ORDER — ROSUVASTATIN CALCIUM 40 MG/1
TABLET, COATED ORAL
Qty: 90 TABLET | Refills: 3 | Status: SHIPPED | OUTPATIENT
Start: 2021-07-12 | End: 2021-07-26

## 2021-07-14 RX ORDER — METHYLPREDNISOLONE 4 MG/1
TABLET ORAL
Qty: 21 EACH | Refills: 0 | OUTPATIENT
Start: 2021-07-14

## 2021-07-20 RX ORDER — LEVOTHYROXINE SODIUM 0.05 MG/1
TABLET ORAL
Qty: 90 TABLET | Refills: 1 | Status: SHIPPED | OUTPATIENT
Start: 2021-07-20

## 2021-07-26 ENCOUNTER — TELEPHONE (OUTPATIENT)
Dept: CARDIOLOGY CLINIC | Facility: CLINIC | Age: 86
End: 2021-07-26

## 2021-07-26 ENCOUNTER — OFFICE VISIT (OUTPATIENT)
Dept: CARDIOLOGY CLINIC | Facility: CLINIC | Age: 86
End: 2021-07-26
Payer: MEDICARE

## 2021-07-26 VITALS
DIASTOLIC BLOOD PRESSURE: 79 MMHG | BODY MASS INDEX: 27.16 KG/M2 | HEIGHT: 68 IN | WEIGHT: 179.19 LBS | SYSTOLIC BLOOD PRESSURE: 124 MMHG | HEART RATE: 56 BPM

## 2021-07-26 DIAGNOSIS — E78.5 HYPERLIPIDEMIA, UNSPECIFIED HYPERLIPIDEMIA TYPE: ICD-10-CM

## 2021-07-26 DIAGNOSIS — I48.21 PERMANENT ATRIAL FIBRILLATION (HCC): Primary | ICD-10-CM

## 2021-07-26 DIAGNOSIS — Z79.01 CURRENT USE OF LONG TERM ANTICOAGULATION: ICD-10-CM

## 2021-07-26 DIAGNOSIS — I10 ESSENTIAL HYPERTENSION: ICD-10-CM

## 2021-07-26 PROCEDURE — 99214 OFFICE O/P EST MOD 30 MIN: CPT | Performed by: INTERNAL MEDICINE

## 2021-07-26 NOTE — TELEPHONE ENCOUNTER
Faxed Dr Shelley Kumar request for pt MCT monitor to  today. Dr. Shelley Kumar indicated order and notes will be updated this afternoon.

## 2021-07-26 NOTE — PROGRESS NOTES
[place] [facility] NOTE      Patient Name: Rosalie Garza MRN: [id number]   : 1934 CSN: [id number], height 5' 8\" (1.727 m), weight 179 lb 3.2 oz (81.3 kg), not currently breastfeeding.   GEN - no distress  HEENT - normal conjunctiva, moist mucosa  Neck - Supple, no LAD  Lungs - nonlabored, clear bilaterally  Heart - JVP 14 cm H2O, carotids normal; ir months as ordered by Dr Ana Maria Boswell for monitoring CBC, CMP, and lipids. She will now complete a MCT monitor to clarify the cause for her symptoms and overall AF rate control.   Also, she is aware of the semi-annual blood work to monitor anticoagulation, as

## 2021-07-26 NOTE — PATIENT INSTRUCTIONS
- Complete a mobile cardiac telemetry (MCT) monitor, and review results with Dr Anirudh Warren. The monitor will be scheduled at [address] [place] [address], and you will receive a phone call from us with scheduling instructions.   - Blood work under Dr Greta Mack

## 2021-08-03 ENCOUNTER — TELEPHONE (OUTPATIENT)
Dept: CARDIOLOGY CLINIC | Facility: CLINIC | Age: 86
End: 2021-08-03

## 2021-08-03 NOTE — TELEPHONE ENCOUNTER
Patient received an MCT event monitor from [address] [place] [address]. office. Patient states she is unable to place monitor on herself and has no one at home to help her. Offered to have someone from [address] [place] [address]. call to help her with placement of monitor and patient declined.  Patient wo

## 2021-08-04 NOTE — TELEPHONE ENCOUNTER
This is the easiest monitor to place and wear. I will ask our staff to call her at home to walk her through the steps.   Thank you, AG

## 2021-08-05 NOTE — TELEPHONE ENCOUNTER
Copy email from [email or web address]  Thu 8/5/2021 8:44 AM    Thank you, we heard about this yesterday and sent a message to our monitor tech to contact her :-)    Have a good day!

## 2021-08-05 NOTE — TELEPHONE ENCOUNTER
Emailed Gadielkaren Greene at [facility] to call patient and advise patient on monitor

## 2021-08-06 NOTE — TELEPHONE ENCOUNTER
What is the number for the monitor tech  Please let me know so I can help the patient  Dr Patrick Juan

## 2021-08-09 RX ORDER — RIVAROXABAN 20 MG/1
TABLET, FILM COATED ORAL
Qty: 90 TABLET | Refills: 1 | Status: SHIPPED | OUTPATIENT
Start: 2021-08-09

## 2021-08-09 RX ORDER — LOSARTAN POTASSIUM 100 MG/1
TABLET ORAL
Qty: 90 TABLET | Refills: 1 | Status: SHIPPED | OUTPATIENT
Start: 2021-08-09 | End: 2021-12-01

## 2021-08-11 NOTE — TELEPHONE ENCOUNTER
If she cannot be helped over the phone, we can set up an appt ASAP in our office at [facility]  to help her set it up correctly. If desired, please let me know.   Thank you, AG

## 2021-08-12 NOTE — TELEPHONE ENCOUNTER
Dr. Luisa Hu, please let us know how we can coordinate getting the monitor on the patient.  If you still have the monitor we can see if the patient can come to the clinic and we will assist her, or she can go the [address] [place] [address] at [facility]

## 2021-08-30 ENCOUNTER — TELEPHONE (OUTPATIENT)
Dept: CARDIOLOGY CLINIC | Facility: CLINIC | Age: 86
End: 2021-08-30

## 2021-08-30 NOTE — TELEPHONE ENCOUNTER
TELEPHONE CALL    I spoke with Dr Angeline Nash about the patient's ambulatory ECG monitor findings. Apparently, the patient had it placed on 7/28/21, but then did not where it after that.   There are 2 events from that date, one is a \"baseline\", the other is

## 2021-09-10 ENCOUNTER — TELEPHONE (OUTPATIENT)
Dept: CARDIOLOGY CLINIC | Facility: CLINIC | Age: 86
End: 2021-09-10

## 2021-09-10 NOTE — TELEPHONE ENCOUNTER
Discussed this with primary care . she is very involved with and knows patient well. She will address this.

## 2021-09-10 NOTE — TELEPHONE ENCOUNTER
Patients neighbor Leonard Pérez would like to remain Anonymous, indicates patient has appointment at the [address] [place] [address],[address] on 9/16 (patient seen in at [facility]).  Leonard Pérez would like for someone to contact her family to make them aware of the importance of them being invol

## 2021-09-11 ENCOUNTER — MOBILE ENCOUNTER (OUTPATIENT)
Dept: INTERNAL MEDICINE CLINIC | Facility: CLINIC | Age: 86
End: 2021-09-11

## 2021-09-11 RX ORDER — ERGOCALCIFEROL 1.25 MG/1
50000 CAPSULE ORAL
Qty: 12 CAPSULE | Refills: 3 | Status: SHIPPED | OUTPATIENT
Start: 2021-09-11

## 2021-09-17 ENCOUNTER — TELEPHONE (OUTPATIENT)
Dept: CARDIOLOGY CLINIC | Facility: CLINIC | Age: 86
End: 2021-09-17

## 2021-09-17 DIAGNOSIS — I48.91 ATRIAL FIBRILLATION, UNSPECIFIED TYPE (HCC): Primary | ICD-10-CM

## 2021-09-17 NOTE — TELEPHONE ENCOUNTER
TELEPHONE CALL     I spoke with Dr Chica Helms again the patient's care. She has doubts about the contribution of AF to the patient's symptoms.   [place], and reportedly the patient felt like a peac

## 2021-09-29 ENCOUNTER — HOSPITAL ENCOUNTER (OUTPATIENT)
Dept: INTERVENTIONAL RADIOLOGY/VASCULAR | Facility: HOSPITAL | Age: 86
Discharge: HOME | End: 2021-09-29
Attending: INTERNAL MEDICINE
Payer: MEDICARE

## 2021-10-07 ENCOUNTER — MED REC SCAN ONLY (OUTPATIENT)
Dept: INTERNAL MEDICINE CLINIC | Facility: CLINIC | Age: 86
End: 2021-10-07

## 2021-10-27 ENCOUNTER — TELEPHONE (OUTPATIENT)
Dept: CARDIOLOGY CLINIC | Facility: CLINIC | Age: 86
End: 2021-10-27

## 2021-10-27 NOTE — TELEPHONE ENCOUNTER
Cardiac monitor results received from [facility] and scanned to [facility]. Results sent to Rob NIXON for review.

## 2021-11-04 ENCOUNTER — IMMUNIZATION (OUTPATIENT)
Dept: LAB | Facility: HOSPITAL | Age: 86
End: 2021-11-04
Attending: EMERGENCY MEDICINE
Payer: MEDICARE

## 2021-11-04 DIAGNOSIS — Z23 NEED FOR VACCINATION: Primary | ICD-10-CM

## 2021-11-04 PROCEDURE — 0064A SARSCOV2 VAC 50MCG/0.25ML IM: CPT

## 2021-12-01 RX ORDER — LOSARTAN POTASSIUM 100 MG/1
100 TABLET ORAL DAILY
Qty: 90 TABLET | Refills: 1 | Status: SHIPPED | OUTPATIENT
Start: 2021-12-01 | End: 2022-03-12

## 2021-12-01 NOTE — TELEPHONE ENCOUNTER
Refill passed per [facility], [facility] protocol.   Requested Prescriptions   Pending Prescriptions Disp Refills    LOSARTAN 100 MG Oral Tab [Pharmacy Med Name: LOSARTAN 100MG TABLETS] 90 tablet 1     Sig: TAKE 1 TABLET(100 MG) BY MOUTH DAILY        Hypertensive Medications Protocol Passed - 12/1/2021 10:31 AM        Passed - CMP or BMP in past 12 months        Passed - Appointment in past 6 or next 3 months        Passed - GFR Non- > 50     Lab Results   Component Value Date    GFRNAA 67 06/23/2021                        Recent Outpatient Visits              4 months ago Permanent atrial fibrillation    [facility] Cher Miranda MD    Office Visit    5 months ago Primary osteoarthritis of both shoulders    Jessenia Lewis MD    Office Visit    6 months ago Dom Loza annual wellness visit, subsequent    Jessenia Lewis MD    Office Visit    1 year ago Permanent atrial fibrillation    [facility] Cher Miranda MD    Office Visit    2 years ago Permanent atrial fibrillation    [facility] Cher Miranda MD    Office Visit

## 2022-03-05 ENCOUNTER — APPOINTMENT (OUTPATIENT)
Dept: CT IMAGING | Facility: HOSPITAL | Age: 87
End: 2022-03-05
Attending: EMERGENCY MEDICINE
Payer: MEDICARE

## 2022-03-05 ENCOUNTER — APPOINTMENT (OUTPATIENT)
Dept: GENERAL RADIOLOGY | Facility: HOSPITAL | Age: 87
End: 2022-03-05
Attending: EMERGENCY MEDICINE
Payer: MEDICARE

## 2022-03-05 ENCOUNTER — HOSPITAL ENCOUNTER (EMERGENCY)
Facility: HOSPITAL | Age: 87
Discharge: HOME | End: 2022-03-05
Attending: EMERGENCY MEDICINE
Payer: MEDICARE

## 2022-03-05 VITALS
HEIGHT: 68 IN | WEIGHT: 170 LBS | OXYGEN SATURATION: 96 % | DIASTOLIC BLOOD PRESSURE: 103 MMHG | HEART RATE: 74 BPM | RESPIRATION RATE: 18 BRPM | TEMPERATURE: 97 F | BODY MASS INDEX: 25.76 KG/M2 | SYSTOLIC BLOOD PRESSURE: 152 MMHG

## 2022-03-05 DIAGNOSIS — S39.012A STRAIN OF LUMBAR REGION, INITIAL ENCOUNTER: Primary | ICD-10-CM

## 2022-03-05 LAB
ANION GAP SERPL CALC-SCNC: 10 MMOL/L (ref 0–18)
BASOPHILS # BLD AUTO: 0.05 X10(3) UL (ref 0–0.2)
BASOPHILS NFR BLD AUTO: 0.9 %
BUN BLD-MCNC: 11 MG/DL (ref 7–18)
BUN/CREAT SERPL: 10.4 (ref 10–20)
CALCIUM BLD-MCNC: 9.1 MG/DL (ref 8.5–10.1)
CHLORIDE SERPL-SCNC: 111 MMOL/L (ref 98–112)
CO2 SERPL-SCNC: 22 MMOL/L (ref 21–32)
CREAT BLD-MCNC: 1.06 MG/DL
DEPRECATED RDW RBC AUTO: 50.3 FL (ref 35.1–46.3)
EOSINOPHIL # BLD AUTO: 0.08 X10(3) UL (ref 0–0.7)
EOSINOPHIL NFR BLD AUTO: 1.5 %
ERYTHROCYTE [DISTWIDTH] IN BLOOD BY AUTOMATED COUNT: 14 % (ref 11–15)
GLUCOSE BLD-MCNC: 129 MG/DL (ref 70–99)
HGB BLD-MCNC: 15.3 G/DL
IMM GRANULOCYTES # BLD AUTO: 0.01 X10(3) UL (ref 0–1)
IMM GRANULOCYTES NFR BLD: 0.2 %
LYMPHOCYTES # BLD AUTO: 0.87 X10(3) UL (ref 1–4)
LYMPHOCYTES NFR BLD AUTO: 16.1 %
MCH RBC QN AUTO: 33.1 PG (ref 26–34)
MCHC RBC AUTO-ENTMCNC: 33.8 G/DL (ref 31–37)
MCV RBC AUTO: 97.8 FL
MONOCYTES # BLD AUTO: 0.42 X10(3) UL (ref 0.1–1)
MONOCYTES NFR BLD AUTO: 7.7 %
NEUTROPHILS # BLD AUTO: 3.99 X10 (3) UL (ref 1.5–7.7)
NEUTROPHILS # BLD AUTO: 3.99 X10(3) UL (ref 1.5–7.7)
NEUTROPHILS NFR BLD AUTO: 73.6 %
OSMOLALITY SERPL CALC.SUM OF ELEC: 297 MOSM/KG (ref 275–295)
PLATELET # BLD AUTO: 257 10(3)UL (ref 150–450)
POTASSIUM SERPL-SCNC: 4.1 MMOL/L (ref 3.5–5.1)
RBC # BLD AUTO: 4.62 X10(6)UL
WBC # BLD AUTO: 5.4 X10(3) UL (ref 4–11)

## 2022-03-05 PROCEDURE — 96374 THER/PROPH/DIAG INJ IV PUSH: CPT

## 2022-03-05 PROCEDURE — 72100 X-RAY EXAM L-S SPINE 2/3 VWS: CPT | Performed by: EMERGENCY MEDICINE

## 2022-03-05 PROCEDURE — 99284 EMERGENCY DEPT VISIT MOD MDM: CPT

## 2022-03-05 PROCEDURE — 80048 BASIC METABOLIC PNL TOTAL CA: CPT | Performed by: EMERGENCY MEDICINE

## 2022-03-05 PROCEDURE — 85025 COMPLETE CBC W/AUTO DIFF WBC: CPT | Performed by: EMERGENCY MEDICINE

## 2022-03-05 PROCEDURE — 70450 CT HEAD/BRAIN W/O DYE: CPT | Performed by: EMERGENCY MEDICINE

## 2022-03-05 RX ORDER — MORPHINE SULFATE 4 MG/ML
4 INJECTION, SOLUTION INTRAMUSCULAR; INTRAVENOUS ONCE
Status: COMPLETED | OUTPATIENT
Start: 2022-03-05 | End: 2022-03-05

## 2022-03-05 RX ORDER — HYDROCODONE BITARTRATE AND ACETAMINOPHEN 5; 325 MG/1; MG/1
1 TABLET ORAL EVERY 6 HOURS PRN
Qty: 16 TABLET | Refills: 0 | Status: SHIPPED | OUTPATIENT
Start: 2022-03-05 | End: 2022-03-12

## 2022-03-05 NOTE — ED INITIAL ASSESSMENT (HPI)
Linda arrives from home alone with her neighbor and fell out of bed over a week ago. She states she is having increased lower back/buttock pain since then. Also with left shoulder pain. Ambulatory but with pain.

## 2022-03-11 NOTE — TELEPHONE ENCOUNTER
Please review refill protocol failed/ no protocol  Requested Prescriptions   Pending Prescriptions Disp Refills    XARELTO 20 MG Oral Tab [Pharmacy Med Name: David Finder 20MG TABLETS] 90 tablet 1     Sig: TAKE 1 TABLET BY MOUTH EVERY DAY        There is no refill protocol information for this order        LOSARTAN 100 MG Oral Tab [Pharmacy Med Name: LOSARTAN 100MG TABLETS] 90 tablet 1     Sig: TAKE 1 TABLET(100 MG) BY MOUTH DAILY        Hypertensive Medications Protocol Failed - 3/11/2022 11:04 AM        Failed - Appointment in past 6 or next 3 months        Failed - GFR Non- > 50     Lab Results   Component Value Date    GFRNAA 47 (L) 03/05/2022                 Passed - CMP or BMP in past 12 months

## 2022-03-12 RX ORDER — RIVAROXABAN 20 MG/1
TABLET, FILM COATED ORAL
Qty: 90 TABLET | Refills: 1 | Status: SHIPPED | OUTPATIENT
Start: 2022-03-12 | End: 2022-09-14

## 2022-03-12 RX ORDER — LOSARTAN POTASSIUM 100 MG/1
TABLET ORAL
Qty: 90 TABLET | Refills: 1 | Status: SHIPPED | OUTPATIENT
Start: 2022-03-12 | End: 2023-05-13

## 2022-03-22 RX ORDER — LEVOTHYROXINE SODIUM 0.05 MG/1
TABLET ORAL
Qty: 90 TABLET | Refills: 1 | Status: SHIPPED | OUTPATIENT
Start: 2022-03-22 | End: 2022-10-07

## 2022-03-22 NOTE — TELEPHONE ENCOUNTER
Refill passed per [facility], [facility] protocol.     Requested Prescriptions   Pending Prescriptions Disp Refills    LEVOTHYROXINE 50 MCG Oral Tab [Pharmacy Med Name: LEVOTHYROXINE 0.05MG (50MCG) TAB] 90 tablet 1     Sig: TAKE ONE TABLET BY MOUTH EVERY MORNING BEFORE BREAKFAST        Thyroid Medication Protocol Passed - 3/21/2022 11:24 AM        Passed - TSH in past 12 months        Passed - Last TSH value is normal     Lab Results   Component Value Date    TSH 3.270 05/18/2021                 Passed - Appointment in past 12 or next 3 months              Recent Outpatient Visits              7 months ago Permanent atrial fibrillation    [address] [place] [address], Mariam Alexander MD    Office Visit    8 months ago Primary osteoarthritis of both shoulders    Ninfa Acosta MD    Office Visit    10 months ago [employer] visit, subsequent    Ninfa Acosta MD    Office Visit    1 year ago Permanent atrial fibrillation    [address] [place] [address], Mariam Alexander MD    Office Visit    2 years ago Permanent atrial fibrillation    [address] [place] [address], Mariam Alexander MD    Office Visit

## 2022-04-22 ENCOUNTER — IMMUNIZATION (OUTPATIENT)
Dept: LAB | Age: 87
End: 2022-04-22
Attending: EMERGENCY MEDICINE
Payer: MEDICARE

## 2022-04-22 DIAGNOSIS — Z23 NEED FOR VACCINATION: Primary | ICD-10-CM

## 2022-04-22 PROCEDURE — 0064A SARSCOV2 VAC 50MCG/0.25ML IM: CPT

## 2022-07-06 ENCOUNTER — TELEPHONE (OUTPATIENT)
Dept: INTERNAL MEDICINE CLINIC | Facility: CLINIC | Age: 87
End: 2022-07-06

## 2022-07-06 NOTE — TELEPHONE ENCOUNTER
Pt called rash on her abdomin   Slight redness    Other lesion seborrheic keratosis  Benadryl topical and xyzal advsied    Call her and add her to my scheule this Thursday at 11:40 AM [facility] Laura

## 2022-07-07 ENCOUNTER — OFFICE VISIT (OUTPATIENT)
Dept: INTERNAL MEDICINE CLINIC | Facility: CLINIC | Age: 87
End: 2022-07-07
Payer: MEDICARE

## 2022-07-07 VITALS
SYSTOLIC BLOOD PRESSURE: 133 MMHG | BODY MASS INDEX: 25.91 KG/M2 | RESPIRATION RATE: 16 BRPM | DIASTOLIC BLOOD PRESSURE: 70 MMHG | WEIGHT: 171 LBS | HEART RATE: 77 BPM | HEIGHT: 68 IN

## 2022-07-07 DIAGNOSIS — I48.91 ATRIAL FIBRILLATION, UNSPECIFIED TYPE (HCC): ICD-10-CM

## 2022-07-07 DIAGNOSIS — R73.01 ABNORMAL FASTING GLUCOSE: ICD-10-CM

## 2022-07-07 DIAGNOSIS — E55.9 VITAMIN D DEFICIENCY: ICD-10-CM

## 2022-07-07 DIAGNOSIS — E03.9 HYPOTHYROIDISM, UNSPECIFIED TYPE: ICD-10-CM

## 2022-07-07 DIAGNOSIS — I10 ESSENTIAL HYPERTENSION: Primary | ICD-10-CM

## 2022-07-07 DIAGNOSIS — H35.30 ARMD (AGE-RELATED MACULAR DEGENERATION), BILATERAL: ICD-10-CM

## 2022-07-07 DIAGNOSIS — E78.5 HYPERLIPIDEMIA, UNSPECIFIED HYPERLIPIDEMIA TYPE: ICD-10-CM

## 2022-07-07 PROCEDURE — 1126F AMNT PAIN NOTED NONE PRSNT: CPT | Performed by: INTERNAL MEDICINE

## 2022-07-07 PROCEDURE — 99214 OFFICE O/P EST MOD 30 MIN: CPT | Performed by: INTERNAL MEDICINE

## 2022-07-13 ENCOUNTER — LAB ENCOUNTER (OUTPATIENT)
Dept: LAB | Age: 87
End: 2022-07-13
Attending: INTERNAL MEDICINE
Payer: MEDICARE

## 2022-07-13 DIAGNOSIS — I10 ESSENTIAL HYPERTENSION: ICD-10-CM

## 2022-07-13 DIAGNOSIS — E55.9 VITAMIN D DEFICIENCY: ICD-10-CM

## 2022-07-13 DIAGNOSIS — E78.5 HYPERLIPIDEMIA, UNSPECIFIED HYPERLIPIDEMIA TYPE: ICD-10-CM

## 2022-07-13 DIAGNOSIS — I48.91 ATRIAL FIBRILLATION, UNSPECIFIED TYPE (HCC): ICD-10-CM

## 2022-07-13 DIAGNOSIS — R73.01 ABNORMAL FASTING GLUCOSE: ICD-10-CM

## 2022-07-13 LAB
ALBUMIN SERPL-MCNC: 3.6 G/DL (ref 3.4–5)
ALBUMIN/GLOB SERPL: 1.1 {RATIO} (ref 1–2)
ALP LIVER SERPL-CCNC: 83 U/L
ALT SERPL-CCNC: 27 U/L
ANION GAP SERPL CALC-SCNC: 8 MMOL/L (ref 0–18)
AST SERPL-CCNC: 23 U/L (ref 15–37)
BILIRUB SERPL-MCNC: 1.2 MG/DL (ref 0.1–2)
BILIRUB UR QL: NEGATIVE
BUN BLD-MCNC: 15 MG/DL (ref 7–18)
BUN/CREAT SERPL: 15 (ref 10–20)
CALCIUM BLD-MCNC: 9.3 MG/DL (ref 8.5–10.1)
CHLORIDE SERPL-SCNC: 108 MMOL/L (ref 98–112)
CHOLEST SERPL-MCNC: 119 MG/DL (ref ?–200)
CO2 SERPL-SCNC: 23 MMOL/L (ref 21–32)
COLOR UR: YELLOW
CREAT BLD-MCNC: 1 MG/DL
DEPRECATED RDW RBC AUTO: 52.7 FL (ref 35.1–46.3)
ERYTHROCYTE [DISTWIDTH] IN BLOOD BY AUTOMATED COUNT: 14 % (ref 11–15)
EST. AVERAGE GLUCOSE BLD GHB EST-MCNC: 114 MG/DL (ref 68–126)
FASTING PATIENT LIPID ANSWER: YES
FASTING STATUS PATIENT QL REPORTED: YES
GLOBULIN PLAS-MCNC: 3.2 G/DL (ref 2.8–4.4)
GLUCOSE BLD-MCNC: 113 MG/DL (ref 70–99)
GLUCOSE UR-MCNC: NEGATIVE MG/DL
HBA1C MFR BLD: 5.6 % (ref ?–5.7)
HCT VFR BLD AUTO: 44.1 %
HDLC SERPL-MCNC: 59 MG/DL (ref 40–59)
HGB BLD-MCNC: 14.4 G/DL
HGB UR QL STRIP.AUTO: NEGATIVE
KETONES UR-MCNC: NEGATIVE MG/DL
LDLC SERPL CALC-MCNC: 46 MG/DL (ref ?–100)
MCH RBC QN AUTO: 32.9 PG (ref 26–34)
MCHC RBC AUTO-ENTMCNC: 32.7 G/DL (ref 31–37)
MCV RBC AUTO: 100.7 FL
NITRITE UR QL STRIP.AUTO: NEGATIVE
NONHDLC SERPL-MCNC: 60 MG/DL (ref ?–130)
OSMOLALITY SERPL CALC.SUM OF ELEC: 290 MOSM/KG (ref 275–295)
PH UR: 7 [PH] (ref 5–8)
PLATELET # BLD AUTO: 245 10(3)UL (ref 150–450)
POTASSIUM SERPL-SCNC: 4.2 MMOL/L (ref 3.5–5.1)
PROT SERPL-MCNC: 6.8 G/DL (ref 6.4–8.2)
PROT UR-MCNC: 30 MG/DL
RBC # BLD AUTO: 4.38 X10(6)UL
SODIUM SERPL-SCNC: 139 MMOL/L (ref 136–145)
SP GR UR STRIP: 1.02 (ref 1–1.03)
T4 FREE SERPL-MCNC: 1.4 NG/DL (ref 0.8–1.7)
TRIGL SERPL-MCNC: 65 MG/DL (ref 30–149)
TSI SER-ACNC: 2.28 MIU/ML (ref 0.36–3.74)
UROBILINOGEN UR STRIP-ACNC: 2
VIT C UR-MCNC: 20 MG/DL
VIT D+METAB SERPL-MCNC: 73.2 NG/ML (ref 30–100)
VLDLC SERPL CALC-MCNC: 9 MG/DL (ref 0–30)
WBC # BLD AUTO: 4.6 X10(3) UL (ref 4–11)

## 2022-07-13 PROCEDURE — 84439 ASSAY OF FREE THYROXINE: CPT

## 2022-07-13 PROCEDURE — 84443 ASSAY THYROID STIM HORMONE: CPT

## 2022-07-13 PROCEDURE — 93010 ELECTROCARDIOGRAM REPORT: CPT | Performed by: INTERNAL MEDICINE

## 2022-07-13 PROCEDURE — 83036 HEMOGLOBIN GLYCOSYLATED A1C: CPT

## 2022-07-13 PROCEDURE — 93005 ELECTROCARDIOGRAM TRACING: CPT

## 2022-07-13 PROCEDURE — 82306 VITAMIN D 25 HYDROXY: CPT

## 2022-07-13 PROCEDURE — 85027 COMPLETE CBC AUTOMATED: CPT

## 2022-07-13 PROCEDURE — 80053 COMPREHEN METABOLIC PANEL: CPT

## 2022-07-13 PROCEDURE — 36415 COLL VENOUS BLD VENIPUNCTURE: CPT

## 2022-07-13 PROCEDURE — 81001 URINALYSIS AUTO W/SCOPE: CPT

## 2022-07-13 PROCEDURE — 80061 LIPID PANEL: CPT

## 2022-07-14 ENCOUNTER — TELEPHONE (OUTPATIENT)
Dept: INTERNAL MEDICINE CLINIC | Facility: CLINIC | Age: 87
End: 2022-07-14

## 2022-07-14 NOTE — TELEPHONE ENCOUNTER
Per Jose(ELVI) nephew of patient would like to talk to Dr Jhonatan Ly not an emergency in regards to patient, per Bear Monroy it is personal. No more info given.

## 2022-07-29 ENCOUNTER — LAB ENCOUNTER (OUTPATIENT)
Dept: LAB | Age: 87
End: 2022-07-29
Attending: INTERNAL MEDICINE
Payer: MEDICARE

## 2022-07-29 DIAGNOSIS — R82.90 ABNORMAL URINALYSIS: ICD-10-CM

## 2022-07-29 LAB
BILIRUB UR QL: NEGATIVE
CLARITY UR: CLEAR
COLOR UR: YELLOW
GLUCOSE UR-MCNC: NEGATIVE MG/DL
HGB UR QL STRIP.AUTO: NEGATIVE
HYALINE CASTS #/AREA URNS AUTO: PRESENT /LPF
KETONES UR-MCNC: NEGATIVE MG/DL
NITRITE UR QL STRIP.AUTO: NEGATIVE
PH UR: 7 [PH] (ref 5–8)
PROT UR-MCNC: NEGATIVE MG/DL
SP GR UR STRIP: 1.01 (ref 1–1.03)
UROBILINOGEN UR STRIP-ACNC: 0.2

## 2022-07-29 PROCEDURE — 81015 MICROSCOPIC EXAM OF URINE: CPT

## 2022-07-29 PROCEDURE — 87086 URINE CULTURE/COLONY COUNT: CPT

## 2022-07-29 PROCEDURE — 81001 URINALYSIS AUTO W/SCOPE: CPT

## 2022-08-22 RX ORDER — ROSUVASTATIN CALCIUM 40 MG/1
TABLET, COATED ORAL
Qty: 90 TABLET | Refills: 3 | Status: SHIPPED | OUTPATIENT
Start: 2022-08-22

## 2022-09-30 ENCOUNTER — HOSPITAL ENCOUNTER (OUTPATIENT)
Dept: CT IMAGING | Facility: HOSPITAL | Age: 87
Discharge: HOME | End: 2022-09-30
Attending: UROLOGY
Payer: MEDICARE

## 2022-09-30 DIAGNOSIS — N35.92 UNSPECIFIED URETHRAL STRICTURE, FEMALE: ICD-10-CM

## 2022-09-30 LAB
CREAT BLD-MCNC: 0.9 MG/DL
GFR SERPLBLD BASED ON 1.73 SQ M-ARVRAT: 61 ML/MIN/1.73M2 (ref 60–?)

## 2022-09-30 PROCEDURE — 82565 ASSAY OF CREATININE: CPT

## 2022-09-30 PROCEDURE — 76377 3D RENDER W/INTRP POSTPROCES: CPT | Performed by: UROLOGY

## 2022-09-30 PROCEDURE — 74178 CT ABD&PLV WO CNTR FLWD CNTR: CPT | Performed by: UROLOGY

## 2022-10-07 RX ORDER — LEVOTHYROXINE SODIUM 0.05 MG/1
TABLET ORAL
Qty: 90 TABLET | Refills: 1 | Status: SHIPPED | OUTPATIENT
Start: 2022-10-07 | End: 2023-08-14

## 2022-10-07 NOTE — TELEPHONE ENCOUNTER
Refill passed per [place] [employer] [facility], [facility] protocol.     Requested Prescriptions   Pending Prescriptions Disp Refills    LEVOTHYROXINE 50 MCG Oral Tab [Pharmacy Med Name: LEVOTHYROXINE 0.05MG (50MCG) TAB] 90 tablet 1     Sig: TAKE 1 TABLET BY MOUTH EVERY MORNING BEFORE BREAKFAST        Thyroid Medication Protocol Passed - 10/7/2022 10:54 AM        Passed - TSH in past 12 months        Passed - Last TSH value is normal     Lab Results   Component Value Date    TSH 2.280 07/13/2022                 Passed - In person appointment or virtual visit in the past 12 mos or appointment in next 3 mos       Recent Outpatient Visits              3 months ago Essential hypertension    Olga Cordero MD    Office Visit    1 year ago Permanent atrial fibrillation    [facility], Vivek Soto MD    Office Visit    1 year ago Primary osteoarthritis of both shoulders    Olga Cordero MD    Office Visit    1 year ago Dom Loza annual wellness visit, subsequent    Olga Cordero MD    Office Visit    2 years ago Permanent atrial fibrillation    [facility], Vivek Soto MD    Office Visit                       Recent Outpatient Visits              3 months ago Essential hypertension    Olga Cordero MD    Office Visit    1 year ago Permanent atrial fibrillation    [facility], Vivek Soto MD    Office Visit    1 year ago Primary osteoarthritis of both shoulders    Olga Cordero MD    Office Visit    1 year ago Dom Loza annual wellness visit, subsequent    Olga Cordero MD    Office Visit    2 years ago Permanent atrial fibrillation    [facility], Vivek Soto MD    Office Visit

## 2022-10-10 ENCOUNTER — TELEPHONE (OUTPATIENT)
Dept: INTERNAL MEDICINE CLINIC | Facility: CLINIC | Age: 87
End: 2022-10-10

## 2022-10-10 NOTE — TELEPHONE ENCOUNTER
[facility] pharmacy states they will be faxing a PA for patient's back brace, just FYI to check fax. Please advise.

## 2022-10-12 NOTE — TELEPHONE ENCOUNTER
Request denied   I dd not order this back brace  Referral to DR Margoth Edge generated  Physiatry for lumbar strain  Notify patient

## 2022-11-16 ENCOUNTER — OFFICE VISIT (OUTPATIENT)
Dept: PHYSICAL MEDICINE AND REHAB | Facility: CLINIC | Age: 87
End: 2022-11-16
Payer: MEDICARE

## 2022-11-16 VITALS — HEIGHT: 67 IN | HEART RATE: 92 BPM | WEIGHT: 170 LBS | OXYGEN SATURATION: 98 % | BODY MASS INDEX: 26.68 KG/M2

## 2022-11-16 DIAGNOSIS — G89.29 CHRONIC BILATERAL LOW BACK PAIN WITHOUT SCIATICA: Primary | ICD-10-CM

## 2022-11-16 DIAGNOSIS — G47.00 INSOMNIA, UNSPECIFIED TYPE: ICD-10-CM

## 2022-11-16 DIAGNOSIS — M54.50 CHRONIC BILATERAL LOW BACK PAIN WITHOUT SCIATICA: Primary | ICD-10-CM

## 2022-11-16 DIAGNOSIS — Z79.01 CURRENT USE OF LONG TERM ANTICOAGULATION: ICD-10-CM

## 2022-11-16 NOTE — PATIENT INSTRUCTIONS
Please use lidocaine patches. These can be obtained at your local pharmacy over-the-counter.   Please do physical therapy with Betty Novak at [facility] in [facility]

## 2022-11-26 PROBLEM — Z79.01 ANTICOAGULANT LONG-TERM USE: Status: ACTIVE | Noted: 2019-11-07

## 2022-11-26 PROBLEM — M54.50 CHRONIC BILATERAL LOW BACK PAIN WITHOUT SCIATICA: Status: ACTIVE | Noted: 2022-11-26

## 2022-11-26 PROBLEM — G89.29 CHRONIC BILATERAL LOW BACK PAIN WITHOUT SCIATICA: Status: ACTIVE | Noted: 2022-11-26

## 2022-11-28 ENCOUNTER — MED REC SCAN ONLY (OUTPATIENT)
Dept: PHYSICAL MEDICINE AND REHAB | Facility: CLINIC | Age: 87
End: 2022-11-28

## 2022-12-03 ENCOUNTER — TELEPHONE (OUTPATIENT)
Dept: INTERNAL MEDICINE CLINIC | Facility: CLINIC | Age: 87
End: 2022-12-03

## 2022-12-03 RX ORDER — METHYLPREDNISOLONE 4 MG/1
TABLET ORAL
Qty: 1 EACH | Refills: 0 | Status: SHIPPED | OUTPATIENT
Start: 2022-12-03

## 2022-12-03 RX ORDER — HYDROCODONE BITARTRATE AND ACETAMINOPHEN 5; 325 MG/1; MG/1
1 TABLET ORAL EVERY 6 HOURS PRN
Qty: 20 TABLET | Refills: 0 | Status: SHIPPED | OUTPATIENT
Start: 2022-12-03

## 2022-12-05 ENCOUNTER — TELEPHONE (OUTPATIENT)
Dept: INTERNAL MEDICINE CLINIC | Facility: CLINIC | Age: 87
End: 2022-12-05

## 2022-12-05 NOTE — TELEPHONE ENCOUNTER
Patient calling back , says she spoke with Dr. Mesha Bliss about 2 hours ago and was told a urine test was going to be order for her to go to the lab. Pt states she is having painful urination. She would like to be called once test has been order , she will get done tomorrow    Dr. Mesha Bliss can you please advise?

## 2022-12-06 ENCOUNTER — LAB ENCOUNTER (OUTPATIENT)
Dept: LAB | Facility: HOSPITAL | Age: 87
End: 2022-12-06
Attending: INTERNAL MEDICINE
Payer: MEDICARE

## 2022-12-06 DIAGNOSIS — E03.9 HYPOTHYROIDISM, UNSPECIFIED TYPE: ICD-10-CM

## 2022-12-06 DIAGNOSIS — I10 ESSENTIAL HYPERTENSION: ICD-10-CM

## 2022-12-06 DIAGNOSIS — R73.01 ABNORMAL FASTING GLUCOSE: ICD-10-CM

## 2022-12-06 DIAGNOSIS — R39.9 UTI SYMPTOMS: ICD-10-CM

## 2022-12-06 LAB
ALBUMIN SERPL-MCNC: 3.6 G/DL (ref 3.4–5)
ALBUMIN/GLOB SERPL: 1.1 {RATIO} (ref 1–2)
ALP LIVER SERPL-CCNC: 95 U/L
ALT SERPL-CCNC: 37 U/L
ANION GAP SERPL CALC-SCNC: 5 MMOL/L (ref 0–18)
AST SERPL-CCNC: 21 U/L (ref 15–37)
BASOPHILS # BLD AUTO: 0.04 X10(3) UL (ref 0–0.2)
BASOPHILS NFR BLD AUTO: 0.8 %
BILIRUB SERPL-MCNC: 1.6 MG/DL (ref 0.1–2)
BILIRUB UR QL: NEGATIVE
BUN BLD-MCNC: 16 MG/DL (ref 7–18)
BUN/CREAT SERPL: 15.8 (ref 10–20)
CALCIUM BLD-MCNC: 9 MG/DL (ref 8.5–10.1)
CHLORIDE SERPL-SCNC: 107 MMOL/L (ref 98–112)
CLARITY UR: CLEAR
CO2 SERPL-SCNC: 27 MMOL/L (ref 21–32)
COLOR UR: YELLOW
CREAT BLD-MCNC: 1.01 MG/DL
DEPRECATED RDW RBC AUTO: 51.6 FL (ref 35.1–46.3)
EOSINOPHIL # BLD AUTO: 0.12 X10(3) UL (ref 0–0.7)
EOSINOPHIL NFR BLD AUTO: 2.3 %
ERYTHROCYTE [DISTWIDTH] IN BLOOD BY AUTOMATED COUNT: 14 % (ref 11–15)
EST. AVERAGE GLUCOSE BLD GHB EST-MCNC: 105 MG/DL (ref 68–126)
FASTING STATUS PATIENT QL REPORTED: YES
GFR SERPLBLD BASED ON 1.73 SQ M-ARVRAT: 54 ML/MIN/1.73M2 (ref 60–?)
GLOBULIN PLAS-MCNC: 3.2 G/DL (ref 2.8–4.4)
GLUCOSE BLD-MCNC: 150 MG/DL (ref 70–99)
GLUCOSE UR-MCNC: NEGATIVE MG/DL
HBA1C MFR BLD: 5.3 % (ref ?–5.7)
HCT VFR BLD AUTO: 45.9 %
HGB BLD-MCNC: 14.9 G/DL
HGB UR QL STRIP.AUTO: NEGATIVE
HYALINE CASTS #/AREA URNS AUTO: PRESENT /LPF
IMM GRANULOCYTES # BLD AUTO: 0.01 X10(3) UL (ref 0–1)
IMM GRANULOCYTES NFR BLD: 0.2 %
KETONES UR-MCNC: NEGATIVE MG/DL
LYMPHOCYTES # BLD AUTO: 0.97 X10(3) UL (ref 1–4)
LYMPHOCYTES NFR BLD AUTO: 18.4 %
MCH RBC QN AUTO: 32.5 PG (ref 26–34)
MCHC RBC AUTO-ENTMCNC: 32.5 G/DL (ref 31–37)
MCV RBC AUTO: 100.2 FL
MONOCYTES # BLD AUTO: 0.4 X10(3) UL (ref 0.1–1)
MONOCYTES NFR BLD AUTO: 7.6 %
NEUTROPHILS # BLD AUTO: 3.73 X10 (3) UL (ref 1.5–7.7)
NEUTROPHILS # BLD AUTO: 3.73 X10(3) UL (ref 1.5–7.7)
NEUTROPHILS NFR BLD AUTO: 70.7 %
NITRITE UR QL STRIP.AUTO: NEGATIVE
OSMOLALITY SERPL CALC.SUM OF ELEC: 292 MOSM/KG (ref 275–295)
PH UR: 7 [PH] (ref 5–8)
PLATELET # BLD AUTO: 227 10(3)UL (ref 150–450)
POTASSIUM SERPL-SCNC: 3.9 MMOL/L (ref 3.5–5.1)
PROT SERPL-MCNC: 6.8 G/DL (ref 6.4–8.2)
PROT UR-MCNC: NEGATIVE MG/DL
RBC # BLD AUTO: 4.58 X10(6)UL
SODIUM SERPL-SCNC: 139 MMOL/L (ref 136–145)
SP GR UR STRIP: 1.01 (ref 1–1.03)
T4 FREE SERPL-MCNC: 1.4 NG/DL (ref 0.8–1.7)
TSI SER-ACNC: 3.46 MIU/ML (ref 0.36–3.74)
UROBILINOGEN UR STRIP-ACNC: 2
VIT C UR-MCNC: NEGATIVE MG/DL
WBC # BLD AUTO: 5.3 X10(3) UL (ref 4–11)

## 2022-12-06 PROCEDURE — 83036 HEMOGLOBIN GLYCOSYLATED A1C: CPT

## 2022-12-06 PROCEDURE — 87186 SC STD MICRODIL/AGAR DIL: CPT

## 2022-12-06 PROCEDURE — 36415 COLL VENOUS BLD VENIPUNCTURE: CPT

## 2022-12-06 PROCEDURE — 87086 URINE CULTURE/COLONY COUNT: CPT

## 2022-12-06 PROCEDURE — 81001 URINALYSIS AUTO W/SCOPE: CPT

## 2022-12-06 PROCEDURE — 87077 CULTURE AEROBIC IDENTIFY: CPT

## 2022-12-06 PROCEDURE — 84443 ASSAY THYROID STIM HORMONE: CPT

## 2022-12-06 PROCEDURE — 84439 ASSAY OF FREE THYROXINE: CPT

## 2022-12-06 PROCEDURE — 85025 COMPLETE CBC W/AUTO DIFF WBC: CPT

## 2022-12-06 PROCEDURE — 80053 COMPREHEN METABOLIC PANEL: CPT

## 2022-12-06 NOTE — TELEPHONE ENCOUNTER
Pt give  medrol pack which she did not take     On norco 5 mg   prn    Still having symptoms  feels like urine dripping down bet legs  Went to  Phiysical therapy today and did well    Ill order labs and bring her in for follow up

## 2022-12-16 ENCOUNTER — TELEPHONE (OUTPATIENT)
Dept: INTERNAL MEDICINE CLINIC | Facility: CLINIC | Age: 87
End: 2022-12-16

## 2022-12-16 NOTE — TELEPHONE ENCOUNTER
Verified name and . Patient calling with Jamar Wilder from [facility] ([phone]) on the line. Jamar Wilder states that [facility] sent a form order to the office via fax that needs to be completed in regards to back brace for the patient at the assisted living. He states he assisted with connected the phone call for the patient to follow up on back brace order. Patient states she does not know why Jamar Wilder is on the line and would like to speak to Dr. Jhonatan Ly directly. She states she will not speak with anyone but Dr. Jhonatan Ly. She is requesting a phone call from Dr. Jhonatan Ly.

## 2023-01-02 ENCOUNTER — LAB ENCOUNTER (OUTPATIENT)
Dept: LAB | Facility: HOSPITAL | Age: 88
End: 2023-01-02
Attending: INTERNAL MEDICINE
Payer: MEDICARE

## 2023-01-02 DIAGNOSIS — R39.9 URINARY SYMPTOM OR SIGN: ICD-10-CM

## 2023-01-02 DIAGNOSIS — R39.9 URINARY SYMPTOM OR SIGN: Primary | ICD-10-CM

## 2023-01-02 LAB
BILIRUB UR QL: NEGATIVE
CLARITY UR: CLEAR
COLOR UR: YELLOW
GLUCOSE UR-MCNC: 50 MG/DL
HGB UR QL STRIP.AUTO: NEGATIVE
HYALINE CASTS #/AREA URNS AUTO: PRESENT /LPF
KETONES UR-MCNC: NEGATIVE MG/DL
NITRITE UR QL STRIP.AUTO: NEGATIVE
PH UR: 7 [PH] (ref 5–8)
PROT UR-MCNC: 30 MG/DL
SP GR UR STRIP: 1.02 (ref 1–1.03)
UROBILINOGEN UR STRIP-ACNC: 2
VIT C UR-MCNC: NEGATIVE MG/DL

## 2023-01-02 PROCEDURE — 87086 URINE CULTURE/COLONY COUNT: CPT

## 2023-01-02 PROCEDURE — 81001 URINALYSIS AUTO W/SCOPE: CPT

## 2023-01-05 ENCOUNTER — OFFICE VISIT (OUTPATIENT)
Dept: INTERNAL MEDICINE CLINIC | Facility: CLINIC | Age: 88
End: 2023-01-05
Payer: MEDICARE

## 2023-01-05 VITALS
DIASTOLIC BLOOD PRESSURE: 87 MMHG | HEIGHT: 68 IN | WEIGHT: 173 LBS | SYSTOLIC BLOOD PRESSURE: 144 MMHG | HEART RATE: 103 BPM | BODY MASS INDEX: 26.22 KG/M2

## 2023-01-05 DIAGNOSIS — I48.20 CHRONIC ATRIAL FIBRILLATION (HCC): ICD-10-CM

## 2023-01-05 DIAGNOSIS — R31.29 MICROHEMATURIA: ICD-10-CM

## 2023-01-05 DIAGNOSIS — M54.16 LUMBAR RADICULOPATHY: Primary | ICD-10-CM

## 2023-01-05 PROCEDURE — 1126F AMNT PAIN NOTED NONE PRSNT: CPT | Performed by: INTERNAL MEDICINE

## 2023-01-05 PROCEDURE — 99215 OFFICE O/P EST HI 40 MIN: CPT | Performed by: INTERNAL MEDICINE

## 2023-01-11 ENCOUNTER — IMMUNIZATION (OUTPATIENT)
Dept: LAB | Age: 88
End: 2023-01-11
Attending: EMERGENCY MEDICINE
Payer: MEDICARE

## 2023-01-11 DIAGNOSIS — Z23 NEED FOR VACCINATION: Primary | ICD-10-CM

## 2023-01-11 PROCEDURE — 0134A SARSCOV2 VAC BVL 50MCG/0.5ML: CPT

## 2023-01-18 ENCOUNTER — MED REC SCAN ONLY (OUTPATIENT)
Dept: INTERNAL MEDICINE CLINIC | Facility: CLINIC | Age: 88
End: 2023-01-18

## 2023-01-18 ENCOUNTER — TELEPHONE (OUTPATIENT)
Dept: INTERNAL MEDICINE CLINIC | Facility: CLINIC | Age: 88
End: 2023-01-18

## 2023-01-18 NOTE — TELEPHONE ENCOUNTER
Ruth from [facility] states forms for a brace where faxed for Dr. Shawn Haney to fill and sign, asking to please fax back when complete.     Ph# [phone]

## 2023-01-19 NOTE — TELEPHONE ENCOUNTER
Called [employer] and spoke with Becki Brownlee who will go ahead and update on his system patient denied Back Brace Order.

## 2023-01-19 NOTE — TELEPHONE ENCOUNTER
REQUEST DENIED     PT DECLINED ORDERED OR REQUESTING A BACK BRACE  TELL COMPANY TO STOP SENDING REQUEST

## 2023-01-23 NOTE — TELEPHONE ENCOUNTER
[facility] request return of paperwork with denial, to be faxed to [phone].

## 2023-01-26 ENCOUNTER — TELEPHONE (OUTPATIENT)
Dept: CARDIOLOGY | Age: 88
End: 2023-01-26

## 2023-01-26 ENCOUNTER — TELEPHONE (OUTPATIENT)
Dept: INTERNAL MEDICINE CLINIC | Facility: CLINIC | Age: 88
End: 2023-01-26

## 2023-01-26 NOTE — TELEPHONE ENCOUNTER
Per Marya she would like a copy of patient last office visit, EKG with tracing ,and labs, fax to [phone]. Patient appointment is on 02/14/2023.

## 2023-01-31 PROBLEM — Z79.01 LONG TERM CURRENT USE OF ANTICOAGULANT: Status: ACTIVE | Noted: 2023-01-31

## 2023-01-31 PROBLEM — I48.20 CHRONIC ATRIAL FIBRILLATION (CMD): Status: ACTIVE | Noted: 2023-01-31

## 2023-01-31 PROBLEM — E78.2 HYPERLIPIDEMIA, MIXED: Status: ACTIVE | Noted: 2023-01-31

## 2023-01-31 PROBLEM — R53.83 OTHER FATIGUE: Status: ACTIVE | Noted: 2023-01-31

## 2023-02-08 ENCOUNTER — TELEPHONE (OUTPATIENT)
Dept: INTERNAL MEDICINE CLINIC | Facility: CLINIC | Age: 88
End: 2023-02-08

## 2023-02-08 NOTE — TELEPHONE ENCOUNTER
Spoke with pt,  verified  Pt was informed of MD recommendation, pt stated understanding. Dr Antolin Hernandez information provided to pt.             Future Appointments   Date Time Provider Vic Bui   3/27/2023  3:40 PM Kailyn Colindres MD [facility]

## 2023-02-08 NOTE — TELEPHONE ENCOUNTER
I spoke with patient related to MRI results and she inquired about Urine Culture results most recently performed on 1/2/23. I made her aware PCP did notate she spoke with her. Patient stated she did not recall she did and I made her aware of what PCP stated in her note. Patient stated she will not see Dr. Yung Varma as Dr. Yung Varma was \"not nice to me\". I made her aware she may see another provider in the Urology group. She insisted on obtaining a recommendation from Dr. Maciel Selby for Urologist. I made her aware I would convey the above to her PCP. Patient verbalized understanding. No further questions or concerns at this time.     Dr. Maciel Selby to advise please

## 2023-02-14 ENCOUNTER — OFFICE VISIT (OUTPATIENT)
Dept: CARDIOLOGY | Age: 88
End: 2023-02-14

## 2023-02-14 VITALS
DIASTOLIC BLOOD PRESSURE: 86 MMHG | HEART RATE: 112 BPM | BODY MASS INDEX: 26.57 KG/M2 | WEIGHT: 174.71 LBS | SYSTOLIC BLOOD PRESSURE: 120 MMHG

## 2023-02-14 DIAGNOSIS — I48.20 CHRONIC ATRIAL FIBRILLATION (CMD): Primary | ICD-10-CM

## 2023-02-14 DIAGNOSIS — R53.83 OTHER FATIGUE: ICD-10-CM

## 2023-02-14 DIAGNOSIS — E78.2 HYPERLIPIDEMIA, MIXED: ICD-10-CM

## 2023-02-14 DIAGNOSIS — Z79.01 LONG TERM CURRENT USE OF ANTICOAGULANT: ICD-10-CM

## 2023-02-14 PROCEDURE — 99244 OFF/OP CNSLTJ NEW/EST MOD 40: CPT | Performed by: INTERNAL MEDICINE

## 2023-02-14 RX ORDER — LEVOTHYROXINE SODIUM 0.05 MG/1
1 TABLET ORAL
COMMUNITY
Start: 2022-10-07

## 2023-02-14 RX ORDER — LOSARTAN POTASSIUM 50 MG/1
50 TABLET ORAL DAILY
COMMUNITY
Start: 2023-01-12 | End: 2023-06-26 | Stop reason: SDUPTHER

## 2023-02-14 RX ORDER — ERGOCALCIFEROL 1.25 MG/1
1.25 CAPSULE ORAL
COMMUNITY
Start: 2023-01-12

## 2023-02-14 RX ORDER — ZOLPIDEM TARTRATE 5 MG/1
5 TABLET ORAL
COMMUNITY
Start: 2023-01-17 | End: 2023-06-26 | Stop reason: CLARIF

## 2023-02-14 RX ORDER — MECLIZINE HYDROCHLORIDE 25 MG/1
25 TABLET ORAL
COMMUNITY
Start: 2018-09-21 | End: 2023-06-26 | Stop reason: CLARIF

## 2023-02-14 RX ORDER — ROSUVASTATIN CALCIUM 40 MG/1
TABLET, COATED ORAL
COMMUNITY
Start: 2022-08-22

## 2023-02-14 RX ORDER — MIRTAZAPINE 15 MG/1
15 TABLET, FILM COATED ORAL
COMMUNITY
Start: 2018-09-21 | End: 2023-06-26 | Stop reason: CLARIF

## 2023-02-14 RX ORDER — HYDROCODONE BITARTRATE AND ACETAMINOPHEN 5; 325 MG/1; MG/1
1 TABLET ORAL EVERY 6 HOURS PRN
COMMUNITY
Start: 2022-12-03 | End: 2023-06-26 | Stop reason: CLARIF

## 2023-02-14 SDOH — HEALTH STABILITY: PHYSICAL HEALTH: ON AVERAGE, HOW MANY MINUTES DO YOU ENGAGE IN EXERCISE AT THIS LEVEL?: 0 MIN

## 2023-02-14 SDOH — HEALTH STABILITY: PHYSICAL HEALTH: ON AVERAGE, HOW MANY DAYS PER WEEK DO YOU ENGAGE IN MODERATE TO STRENUOUS EXERCISE (LIKE A BRISK WALK)?: 0 DAYS

## 2023-02-14 ASSESSMENT — ENCOUNTER SYMPTOMS
HALLUCINATIONS: 0
SHORTNESS OF BREATH: 0
FEVER: 0
COUGH: 0
WEIGHT GAIN: 0
HEMATOCHEZIA: 0
ALLERGIC/IMMUNOLOGIC COMMENTS: NO NEW FOOD ALLERGIES
HEMOPTYSIS: 0
LIGHT-HEADEDNESS: 0
DOUBLE VISION: 0
SYNCOPE: 0
BRUISES/BLEEDS EASILY: 0
NEAR-SYNCOPE: 0
CHILLS: 0
SUSPICIOUS LESIONS: 0
WEAKNESS: 1
WEIGHT LOSS: 0
LOSS OF BALANCE: 0
DEPRESSION: 0

## 2023-02-14 ASSESSMENT — PATIENT HEALTH QUESTIONNAIRE - PHQ9
SUM OF ALL RESPONSES TO PHQ9 QUESTIONS 1 AND 2: 0
2. FEELING DOWN, DEPRESSED OR HOPELESS: NOT AT ALL
1. LITTLE INTEREST OR PLEASURE IN DOING THINGS: NOT AT ALL
CLINICAL INTERPRETATION OF PHQ2 SCORE: NO FURTHER SCREENING NEEDED
SUM OF ALL RESPONSES TO PHQ9 QUESTIONS 1 AND 2: 0

## 2023-02-20 ENCOUNTER — OFFICE VISIT (OUTPATIENT)
Dept: PHYSICAL MEDICINE AND REHAB | Facility: CLINIC | Age: 88
End: 2023-02-20
Payer: MEDICARE

## 2023-02-20 ENCOUNTER — TELEPHONE (OUTPATIENT)
Dept: PHYSICAL MEDICINE AND REHAB | Facility: CLINIC | Age: 88
End: 2023-02-20

## 2023-02-20 VITALS
BODY MASS INDEX: 25.76 KG/M2 | HEART RATE: 126 BPM | HEIGHT: 68 IN | OXYGEN SATURATION: 98 % | WEIGHT: 170 LBS | SYSTOLIC BLOOD PRESSURE: 118 MMHG | DIASTOLIC BLOOD PRESSURE: 84 MMHG

## 2023-02-20 DIAGNOSIS — Z79.01 CURRENT USE OF LONG TERM ANTICOAGULATION: ICD-10-CM

## 2023-02-20 DIAGNOSIS — G47.00 INSOMNIA, UNSPECIFIED TYPE: ICD-10-CM

## 2023-02-20 DIAGNOSIS — G89.29 CHRONIC BILATERAL LOW BACK PAIN WITHOUT SCIATICA: Primary | ICD-10-CM

## 2023-02-20 DIAGNOSIS — M54.50 CHRONIC BILATERAL LOW BACK PAIN WITHOUT SCIATICA: Primary | ICD-10-CM

## 2023-02-20 PROCEDURE — 1125F AMNT PAIN NOTED PAIN PRSNT: CPT | Performed by: PHYSICAL MEDICINE & REHABILITATION

## 2023-02-20 PROCEDURE — 99214 OFFICE O/P EST MOD 30 MIN: CPT | Performed by: PHYSICAL MEDICINE & REHABILITATION

## 2023-02-20 NOTE — TELEPHONE ENCOUNTER
Per Medicare Guidelines -no authorization is required for Bilateral L3-4, L4-5 and L5-S1 facet injections CPT 51196-56, 96423X9     Status: Authorization is not required however may be subject to review once claim is submitted-Covered Benefit     Per CMS Guidelines-One to two levels, either unilateral or bilateral, are allowed per session per spine region. The need for a three or four-level procedure bilaterally may be considered under unique circumstances and with sufficient documentation of medical necessity on appeal. A session is a time period, which includes all procedures (i.e., medial branch block (MBB), intraarticular injections (IA), facet cyst ruptures, and RFA ablations that are performed during the same day. Frequency Limitation: For each covered spinal region no more than two (2) radiofrequency sessions will be reimbursed per rolling 12 months.

## 2023-02-22 NOTE — TELEPHONE ENCOUNTER
BMI: 25.85 kg/m2: No restrictions with injection. Patient has been scheduled for Bilateral L3-4, L4-5 and L5-S1 facet injections [facility] 3[facility]2/[facility] with Dr. Amena Duran.   -Anesthesia type: Local.  -Patient informed to fast 12 hours prior to procedure with IVCS/MAC.   -Scheduling [address] covid testing required for all procedures whether patient is vaccinated or not. -Patient was advised that if he/she does receive the covid vaccine it needs to be at least 2 weeks before or after the injection. -Medications and allergies reviewed. -Patient reminded to hold NSAIDs (Ibuprofen, ASA 81, Aleve, Naproxen, Mobic, Diclofenac, Etodolac, Celebrex etc.) for 3 days prior to Lumbar MBB/Facet if BMI is greater than 35. For Cervical injections only hold multivitamins, Vitamin E, Fish Oil, Phentermine/Lomaira for 7 days prior to injection and NSAIDS.  mg to be held for 7 days prior to injections.  -If patient is receiving MAC/IVCS Phentermine Michaela Cuna) will need to be held for 7 days prior to injection.  -If on blood thinner clearance has been received to hold this medication by provider.   -Patient informed he/she will need a  to and from procedure. Rochelle Dockery is located in the [facility] [address] 1[address] floor,  may park in the yellow/purple parking lot. Patient verbalized understanding and agrees with plan.  -----> Scheduled in Epic: Yes  -----> Scheduled in [employer]:  Yes

## 2023-02-23 ENCOUNTER — CLINICAL DOCUMENTATION (OUTPATIENT)
Dept: CARDIOLOGY | Age: 88
End: 2023-02-23

## 2023-02-23 NOTE — TELEPHONE ENCOUNTER
Spoke with Ramses Álvarez and confirmed patients PCP is requesting clearance from patients cardiologist [address] [place] [address] for patient to hold Xarelto prior to procedure. Confirmed 's fax number to send clearance form.      Form faxed to [phone]

## 2023-02-23 NOTE — TELEPHONE ENCOUNTER
calling to inform that patient cardiologist Jace Baumgarten requesting a anjel form prior to injection as she is currently taking a blood thinner (Xarelto) fax to [phone]

## 2023-02-27 ENCOUNTER — TELEPHONE (OUTPATIENT)
Dept: CARDIOLOGY | Age: 88
End: 2023-02-27

## 2023-02-27 NOTE — TELEPHONE ENCOUNTER
Status of Anticoag  [] Clearance pending  [x] Clearance approved to hold Xarelto for 2 days prior to injection. Placed into scanning. [] Clearance denied. Unable to leave a message- busy line.

## 2023-03-02 ENCOUNTER — APPOINTMENT (OUTPATIENT)
Dept: CARDIOLOGY | Age: 88
End: 2023-03-02

## 2023-03-02 ENCOUNTER — OFFICE VISIT (OUTPATIENT)
Dept: SURGERY | Facility: CLINIC | Age: 88
End: 2023-03-02
Payer: MEDICARE

## 2023-03-02 DIAGNOSIS — M47.816 LUMBAR SPONDYLOSIS: Primary | ICD-10-CM

## 2023-03-02 PROCEDURE — 64495 INJ PARAVERT F JNT L/S 3 LEV: CPT | Performed by: PHYSICAL MEDICINE & REHABILITATION

## 2023-03-02 PROCEDURE — 64494 INJ PARAVERT F JNT L/S 2 LEV: CPT | Performed by: PHYSICAL MEDICINE & REHABILITATION

## 2023-03-02 PROCEDURE — 64493 INJ PARAVERT F JNT L/S 1 LEV: CPT | Performed by: PHYSICAL MEDICINE & REHABILITATION

## 2023-03-02 NOTE — PROCEDURES
Preoperative Diagnosis:  (M47.816) Lumbar spondylosis  (primary encounter diagnosis)       Postoperative Diagnosis:  (M47.816) Lumbar spondylosis  (primary encounter diagnosis)       Procedures:  Bilateral L3-4, L4-5 and L5-S1 Facet injection(s) under fluoroscopic guidance and contrast enhancement. Surgeon:  Jerrica Cantu M.D. Anesthesia:  Local      OPERATIVE PROCEDURE:  The patient was consented. She was brought into the operating room and placed on the fluoroscopy table in the prone position. She was sterilely prepped and draped in routine fashion. The facet joints were identified. The overlying skin was anesthetized. 22-gauge spinal needles were introduced and directed towards the Right L3-4, L4-5 and L5-S1 facet joint(s). Needle position was verified using fluoroscopy and radiographic contrast showing a facet arthrogram.  Radiographic interpretation was that the needle was in proper position for facet injection(s). I placed a mixture of 0.5mL of 6mg/mL Celestone and 0.5mL of 1% preservative-free lidocaine into each joint. The same procedure was performed on the contralateral side. The patient tolerated the procedure well without any immediate complications. She was given discharge instructions and is to follow up with me in approximately two weeks.

## 2023-03-08 ENCOUNTER — TELEPHONE (OUTPATIENT)
Dept: INTERNAL MEDICINE CLINIC | Facility: CLINIC | Age: 88
End: 2023-03-08

## 2023-03-08 ENCOUNTER — OFFICE VISIT (OUTPATIENT)
Dept: INTERNAL MEDICINE CLINIC | Facility: CLINIC | Age: 88
End: 2023-03-08

## 2023-03-08 VITALS
WEIGHT: 171 LBS | HEART RATE: 80 BPM | HEIGHT: 68 IN | BODY MASS INDEX: 25.91 KG/M2 | DIASTOLIC BLOOD PRESSURE: 75 MMHG | SYSTOLIC BLOOD PRESSURE: 114 MMHG

## 2023-03-08 DIAGNOSIS — I48.11 LONGSTANDING PERSISTENT ATRIAL FIBRILLATION (HCC): ICD-10-CM

## 2023-03-08 DIAGNOSIS — R32 URINARY INCONTINENCE, UNSPECIFIED TYPE: ICD-10-CM

## 2023-03-08 DIAGNOSIS — M47.816 LUMBAR SPONDYLOSIS: ICD-10-CM

## 2023-03-08 DIAGNOSIS — E78.5 HYPERLIPIDEMIA, UNSPECIFIED HYPERLIPIDEMIA TYPE: ICD-10-CM

## 2023-03-08 DIAGNOSIS — R73.03 PREDIABETES: ICD-10-CM

## 2023-03-08 DIAGNOSIS — I10 ESSENTIAL HYPERTENSION: Primary | ICD-10-CM

## 2023-03-08 DIAGNOSIS — Z01.00 EYE EXAM, ROUTINE: ICD-10-CM

## 2023-03-08 DIAGNOSIS — H35.30 ARMD (AGE-RELATED MACULAR DEGENERATION), BILATERAL: ICD-10-CM

## 2023-03-08 DIAGNOSIS — I83.90 VARICOSE VEINS: ICD-10-CM

## 2023-03-08 PROCEDURE — 1126F AMNT PAIN NOTED NONE PRSNT: CPT | Performed by: INTERNAL MEDICINE

## 2023-03-08 PROCEDURE — 99214 OFFICE O/P EST MOD 30 MIN: CPT | Performed by: INTERNAL MEDICINE

## 2023-03-08 NOTE — TELEPHONE ENCOUNTER
Will verify [facility] office on 03/10/23 , no forms received at [employer].

## 2023-03-08 NOTE — TELEPHONE ENCOUNTER
Crys Keys calling that she faxed over medical forms for the patient.  Needs it signed and most recent office as well    Fax: [phone]

## 2023-03-09 ENCOUNTER — MED REC SCAN ONLY (OUTPATIENT)
Dept: INTERNAL MEDICINE CLINIC | Facility: CLINIC | Age: 88
End: 2023-03-09

## 2023-03-10 NOTE — TELEPHONE ENCOUNTER
Rikva Pollock is calling again to check the status on the forms completion.  She is asking that the papers are faxed back to this number:    Fax# [phone]

## 2023-03-10 NOTE — TELEPHONE ENCOUNTER
DO NOT FAX OV NOTES  CALL THE COMPANY AND CANCEL THE ORDER  I CALLED THE PATIENT AND SHE DOES NOT WANT ANY OF THE EQUIPMENT BEING REQUESTED

## 2023-03-10 NOTE — TELEPHONE ENCOUNTER
Per [facility] @ [phone] they received the orders but they need the most rest chart notes fax to [phone].

## 2023-03-11 NOTE — TELEPHONE ENCOUNTER
Call Kip Gimenez to cancel the order  no answer , left message to call back the office not a confidential VM.

## 2023-03-11 NOTE — TELEPHONE ENCOUNTER
Erick Villalba from [address] calling back to request OV notes. Instructed on providers message below to cancel the order. Caller disconnected the line.

## 2023-03-13 NOTE — TELEPHONE ENCOUNTER
Called Shannon Baldwin [phone] no answer and unable to leave VM busy tone after a few rings . Dr Rio Schrader is not approving for OV notes to be faxed , see message below . Shannon Baldwin was notified by RN on 03/11/23 .

## 2023-03-17 ENCOUNTER — OFFICE VISIT (OUTPATIENT)
Dept: PHYSICAL MEDICINE AND REHAB | Facility: CLINIC | Age: 88
End: 2023-03-17
Payer: MEDICARE

## 2023-03-17 VITALS
SYSTOLIC BLOOD PRESSURE: 132 MMHG | WEIGHT: 171 LBS | HEIGHT: 68 IN | DIASTOLIC BLOOD PRESSURE: 70 MMHG | BODY MASS INDEX: 25.91 KG/M2 | OXYGEN SATURATION: 98 % | HEART RATE: 79 BPM

## 2023-03-17 DIAGNOSIS — M54.50 CHRONIC BILATERAL LOW BACK PAIN WITHOUT SCIATICA: Primary | ICD-10-CM

## 2023-03-17 DIAGNOSIS — G89.29 CHRONIC BILATERAL LOW BACK PAIN WITHOUT SCIATICA: Primary | ICD-10-CM

## 2023-03-17 DIAGNOSIS — G47.00 INSOMNIA, UNSPECIFIED TYPE: ICD-10-CM

## 2023-03-17 DIAGNOSIS — Z79.01 CURRENT USE OF LONG TERM ANTICOAGULATION: ICD-10-CM

## 2023-03-17 DIAGNOSIS — N81.89 PELVIC FLOOR WEAKNESS: ICD-10-CM

## 2023-03-17 PROCEDURE — 1125F AMNT PAIN NOTED PAIN PRSNT: CPT | Performed by: PHYSICAL MEDICINE & REHABILITATION

## 2023-03-17 PROCEDURE — 99214 OFFICE O/P EST MOD 30 MIN: CPT | Performed by: PHYSICAL MEDICINE & REHABILITATION

## 2023-03-17 RX ORDER — DULOXETIN HYDROCHLORIDE 20 MG/1
20 CAPSULE, DELAYED RELEASE ORAL DAILY
Qty: 30 CAPSULE | Refills: 0 | Status: SHIPPED | OUTPATIENT
Start: 2023-03-17 | End: 2023-04-16

## 2023-03-23 ENCOUNTER — ANCILLARY PROCEDURE (OUTPATIENT)
Dept: CARDIOLOGY | Age: 88
End: 2023-03-23
Attending: INTERNAL MEDICINE

## 2023-03-23 DIAGNOSIS — R53.83 OTHER FATIGUE: ICD-10-CM

## 2023-03-23 DIAGNOSIS — I48.20 CHRONIC ATRIAL FIBRILLATION (CMD): ICD-10-CM

## 2023-03-23 LAB
AORTIC VALVE AREA (AVA): 0.76
AV PEAK GRADIENT (AVPG): 5
AV PEAK VELOCITY (AVPV): 1.15
AV STENOSIS SEVERITY TEXT: NORMAL
AVI LVOT PEAK GRADIENT (LVOTMG): 1.1
E WAVE DECELARATION TIME (MDT): 10.65
LEFT INTERNAL DIMENSION IN SYSTOLE (LVSD): 1.1
LEFT VENTRICULAR INTERNAL DIMENSION IN DIASTOLE (LVDD): 2.4
LEFT VENTRICULAR POSTERIOR WALL IN END DIASTOLE (LVPW): 4.2
LV EF: NORMAL %
LVOT VTI (LVOTVTI): 0.76
MV E TISSUE VEL MED (MESV): 8.99
MV E WAVE VEL/E TISSUE VEL MED(MSR): 7.13
MV PEAK A VELOCITY (MVPAV): 114
RV END SYSTOLIC LONGITUDINAL STRAIN FREE WALL (RVGS): 2.2
TRICUSPID VALVE ANNULAR PEAK VELOCITY (TVAPV): 24
TRICUSPID VALVE PEAK REGURGITATION VELOCITY (TRPV): 3.4
TV ESTIMATED RIGHT ARTERIAL PRESSURE (RAP): 11.2

## 2023-03-23 PROCEDURE — 93306 TTE W/DOPPLER COMPLETE: CPT | Performed by: INTERNAL MEDICINE

## 2023-03-27 ENCOUNTER — TELEPHONE (OUTPATIENT)
Dept: CARDIOLOGY | Age: 88
End: 2023-03-27

## 2023-03-27 ENCOUNTER — OFFICE VISIT (OUTPATIENT)
Dept: SURGERY | Facility: CLINIC | Age: 88
End: 2023-03-27

## 2023-03-27 VITALS
SYSTOLIC BLOOD PRESSURE: 109 MMHG | DIASTOLIC BLOOD PRESSURE: 75 MMHG | HEIGHT: 68 IN | HEART RATE: 139 BPM | WEIGHT: 171 LBS | BODY MASS INDEX: 25.91 KG/M2

## 2023-03-27 DIAGNOSIS — R10.2 PELVIC PAIN: Primary | ICD-10-CM

## 2023-03-27 DIAGNOSIS — R31.29 MICROHEMATURIA: ICD-10-CM

## 2023-03-27 PROCEDURE — 93227 XTRNL ECG REC<48 HR R&I: CPT | Performed by: INTERNAL MEDICINE

## 2023-03-27 RX ORDER — DILTIAZEM HYDROCHLORIDE 180 MG/1
180 CAPSULE, COATED, EXTENDED RELEASE ORAL DAILY
Qty: 90 CAPSULE | Refills: 3 | Status: SHIPPED | OUTPATIENT
Start: 2023-03-27

## 2023-04-11 NOTE — TELEPHONE ENCOUNTER
Refill Request    Medication request: DULoxetine (CYMBALTA) 20 MG Oral Cap DR Particles. Take 1 capsule (20 mg total) by mouth daily. Morena Evans MD   Due back to clinic per last office note:  Per Dr. Lyles Maker: Cornelia Johnston 4 weeks. \"  NOV: Visit date not found      [place]: 03/17/2023 #30    Urine drug screen (if applicable): n/a  Pain contract: n/a    LOV plan (if weaning or changing medications): Per Dr. Lyles Maker: Sandra Baumanne her chronic arthritis we discussed Cymbalta and she agreed to start 20 mg.\"

## 2023-04-12 RX ORDER — DULOXETIN HYDROCHLORIDE 20 MG/1
CAPSULE, DELAYED RELEASE ORAL
Qty: 30 CAPSULE | Refills: 0 | Status: SHIPPED | OUTPATIENT
Start: 2023-04-12

## 2023-04-28 ENCOUNTER — APPOINTMENT (OUTPATIENT)
Dept: CT IMAGING | Facility: HOSPITAL | Age: 88
End: 2023-04-28
Payer: MEDICARE

## 2023-04-28 ENCOUNTER — APPOINTMENT (OUTPATIENT)
Dept: CT IMAGING | Facility: HOSPITAL | Age: 88
End: 2023-04-28
Attending: EMERGENCY MEDICINE
Payer: MEDICARE

## 2023-04-28 ENCOUNTER — APPOINTMENT (OUTPATIENT)
Dept: GENERAL RADIOLOGY | Facility: HOSPITAL | Age: 88
End: 2023-04-28
Attending: EMERGENCY MEDICINE
Payer: MEDICARE

## 2023-04-28 ENCOUNTER — HOSPITAL ENCOUNTER (EMERGENCY)
Facility: HOSPITAL | Age: 88
Discharge: HOME | End: 2023-04-28
Attending: EMERGENCY MEDICINE
Payer: MEDICARE

## 2023-04-28 VITALS
HEIGHT: 67 IN | WEIGHT: 160 LBS | DIASTOLIC BLOOD PRESSURE: 70 MMHG | BODY MASS INDEX: 25.11 KG/M2 | SYSTOLIC BLOOD PRESSURE: 110 MMHG | TEMPERATURE: 97 F | OXYGEN SATURATION: 98 % | RESPIRATION RATE: 17 BRPM | HEART RATE: 75 BPM
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
## 2023-04-28 DIAGNOSIS — S00.83XA CONTUSION OF FACE, INITIAL ENCOUNTER: ICD-10-CM

## 2023-04-28 DIAGNOSIS — S29.9XXA CHEST WALL INJURY, INITIAL ENCOUNTER: ICD-10-CM

## 2023-04-28 PROCEDURE — 99284 EMERGENCY DEPT VISIT MOD MDM: CPT | Performed by: EMERGENCY MEDICINE

## 2023-04-28 PROCEDURE — 93005 ELECTROCARDIOGRAM TRACING: CPT

## 2023-04-28 PROCEDURE — 70450 CT HEAD/BRAIN W/O DYE: CPT | Performed by: EMERGENCY MEDICINE

## 2023-04-28 PROCEDURE — 72125 CT NECK SPINE W/O DYE: CPT | Performed by: EMERGENCY MEDICINE

## 2023-04-28 PROCEDURE — 71101 X-RAY EXAM UNILAT RIBS/CHEST: CPT | Performed by: EMERGENCY MEDICINE

## 2023-04-28 PROCEDURE — 93010 ELECTROCARDIOGRAM REPORT: CPT | Performed by: EMERGENCY MEDICINE

## 2023-04-28 RX ORDER — ACETAMINOPHEN 500 MG
1000 TABLET ORAL ONCE
Status: COMPLETED | OUTPATIENT
Start: 2023-04-28 | End: 2023-04-28

## 2023-04-28 NOTE — ED INITIAL ASSESSMENT (HPI)
Pt presents with caregiver s/p walking outside on concrete, turning her head quickly, and then \"loosing my balance\" and falling on right side of head on concrete. Pt denies LOC. Pt reports Xarelto use. Pt with hematoma to lateral side of right eye. Pt denies neck pain.

## 2023-04-28 NOTE — ED QUICK NOTES
Assessment completed. Patient a/o x 4, ambulatory, hematoma noted on right side of face near right orbit.  Patient does not complain of pain anywhere else with palpation and no other abnormalities including deformities, lacerations, edema, or abrasions noted

## 2023-04-29 NOTE — ED QUICK NOTES
MD cleared patient for discharge. Patient and caregivers provided with discharge paperwork and RN discussed plan of care. Patient and caregivers given opportunity to ask any questions. Patient was in no apparent distress. Patient wheeled out of ED in wheelchair with caregivers.

## 2023-04-30 LAB
Q-T INTERVAL: 394 MS
QRS DURATION: 86 MS
QTC CALCULATION (BEZET): 431 MS
R AXIS: -15 DEGREES
T AXIS: -14 DEGREES
VENTRICULAR RATE: 72 BPM

## 2023-05-03 ENCOUNTER — TELEPHONE (OUTPATIENT)
Dept: PHYSICAL MEDICINE AND REHAB | Facility: CLINIC | Age: 88
End: 2023-05-03

## 2023-05-03 ENCOUNTER — OFFICE VISIT (OUTPATIENT)
Dept: PHYSICAL MEDICINE AND REHAB | Facility: CLINIC | Age: 88
End: 2023-05-03
Payer: MEDICARE

## 2023-05-03 VITALS
OXYGEN SATURATION: 99 % | HEART RATE: 85 BPM | WEIGHT: 160 LBS | DIASTOLIC BLOOD PRESSURE: 74 MMHG | HEIGHT: 67 IN | SYSTOLIC BLOOD PRESSURE: 112 MMHG | BODY MASS INDEX: 25.11 KG/M2

## 2023-05-03 DIAGNOSIS — G47.00 INSOMNIA, UNSPECIFIED TYPE: ICD-10-CM

## 2023-05-03 DIAGNOSIS — G89.29 CHRONIC BILATERAL LOW BACK PAIN WITHOUT SCIATICA: Primary | ICD-10-CM

## 2023-05-03 DIAGNOSIS — M54.50 CHRONIC BILATERAL LOW BACK PAIN WITHOUT SCIATICA: Primary | ICD-10-CM

## 2023-05-03 DIAGNOSIS — N81.89 PELVIC FLOOR WEAKNESS: ICD-10-CM

## 2023-05-03 DIAGNOSIS — Z79.01 CURRENT USE OF LONG TERM ANTICOAGULATION: ICD-10-CM

## 2023-05-03 PROCEDURE — 99214 OFFICE O/P EST MOD 30 MIN: CPT | Performed by: PHYSICAL MEDICINE & REHABILITATION

## 2023-05-03 PROCEDURE — 1125F AMNT PAIN NOTED PAIN PRSNT: CPT | Performed by: PHYSICAL MEDICINE & REHABILITATION

## 2023-05-03 NOTE — TELEPHONE ENCOUNTER
Per Medicare Guidelines -no authorization is required Left L3-4, L4-5 and L5-S1 facet injections  CPT 54075, 87024    Status: Authorization is not required however may be subject to review once claim is submitted-Covered Benefit     Per CMS Guidelines-One to two levels, either unilateral or bilateral, are allowed per session per spine region. The need for a three or four-level procedure bilaterally may be considered under unique circumstances and with sufficient documentation of medical necessity on appeal. A session is a time period, which includes all procedures (i.e., medial branch block (MBB), intraarticular injections (IA), facet cyst ruptures, and RFA ablations that are performed during the same day. Frequency Limitation: For each covered spinal region no more than two (2) radiofrequency sessions will be reimbursed per rolling 12 months.

## 2023-05-04 ENCOUNTER — MED REC SCAN ONLY (OUTPATIENT)
Dept: PHYSICAL MEDICINE AND REHAB | Facility: CLINIC | Age: 88
End: 2023-05-04

## 2023-05-04 NOTE — TELEPHONE ENCOUNTER
Selina Guillermo returning phone call from nurse to schedule injection. Please call her at #[phone].

## 2023-05-04 NOTE — TELEPHONE ENCOUNTER
Spoke with Gus Downing to schedule injection. Injection date held for 5/11/23. Patient is currently on Xarelto. Need clearance from cardiologist . Antioag letter faxed to [address] [place] [address] Fax #: [phone]      Status of Anticoag  [x] Clearance pending-hold Xarelto for 2 days prior to injection. [] Clearance approved  [] Clearance denied      Gus  requests a return call to phone #: [phone]. KennethAeluros  handles patients scheduling.

## 2023-05-08 ENCOUNTER — CLINICAL DOCUMENTATION (OUTPATIENT)
Dept: CARDIOLOGY | Age: 88
End: 2023-05-08

## 2023-05-08 NOTE — TELEPHONE ENCOUNTER
S/w Renetta Macdonald - Caregiver. HIPAA verified. Renetta Macdonald inquiring about update regarding patient's hold instructions. Informed Renetta Macdonald we have not yet received word back from Dr. Flory Eli office. Informed Renetta Macdonald that I will send a second clearance fax & once we receive word back we will call Renetta Macdonald back with official scheduling instructions.

## 2023-05-11 ENCOUNTER — OFFICE VISIT (OUTPATIENT)
Dept: SURGERY | Facility: CLINIC | Age: 88
End: 2023-05-11
Payer: MEDICARE

## 2023-05-11 DIAGNOSIS — M47.816 LUMBAR SPONDYLOSIS: Primary | ICD-10-CM

## 2023-05-11 PROCEDURE — 64493 INJ PARAVERT F JNT L/S 1 LEV: CPT | Performed by: PHYSICAL MEDICINE & REHABILITATION

## 2023-05-11 PROCEDURE — 64494 INJ PARAVERT F JNT L/S 2 LEV: CPT | Performed by: PHYSICAL MEDICINE & REHABILITATION

## 2023-05-11 PROCEDURE — 64495 INJ PARAVERT F JNT L/S 3 LEV: CPT | Performed by: PHYSICAL MEDICINE & REHABILITATION

## 2023-05-11 NOTE — PROCEDURES
Preoperative Diagnosis:  (M47.816) Lumbar spondylosis  (primary encounter diagnosis)       Postoperative Diagnosis:  (M47.816) Lumbar spondylosis  (primary encounter diagnosis)       Procedures:  Left L3-4, L4-5 and L5-S1 Facet injection(s) under fluoroscopic guidance and contrast enhancement. Surgeon:  Rajiv Townsend M.D. Anesthesia:  Local      OPERATIVE PROCEDURE:  The patient was consented. She was brought into the operating room and placed on the fluoroscopy table in the prone position. She was sterilely prepped and draped in routine fashion. The facet joints were identified. The overlying skin was anesthetized. 22-gauge spinal needles were introduced and directed towards the Left L3-4, L4-5 and L5-S1 facet joint(s). Needle position was verified using fluoroscopy and radiographic contrast showing a facet arthrogram.  Radiographic interpretation was that the needle was in proper position for facet injection(s). I placed a mixture of 0.5mL of 6mg/mL Celestone and 0.5mL of 1% preservative-free lidocaine into each joint. The patient tolerated the procedure well without any immediate complications. She was given discharge instructions and is to follow up with me in approximately two weeks.

## 2023-05-18 ENCOUNTER — APPOINTMENT (OUTPATIENT)
Dept: GENERAL RADIOLOGY | Facility: HOSPITAL | Age: 88
End: 2023-05-18
Attending: EMERGENCY MEDICINE
Payer: MEDICARE

## 2023-05-18 ENCOUNTER — APPOINTMENT (OUTPATIENT)
Dept: CT IMAGING | Facility: HOSPITAL | Age: 88
End: 2023-05-18
Attending: EMERGENCY MEDICINE
Payer: MEDICARE

## 2023-05-18 ENCOUNTER — APPOINTMENT (OUTPATIENT)
Dept: CT IMAGING | Facility: HOSPITAL | Age: 88
DRG: 682 | End: 2023-05-18
Attending: EMERGENCY MEDICINE
Payer: MEDICARE

## 2023-05-18 ENCOUNTER — HOSPITAL ENCOUNTER (INPATIENT)
Facility: HOSPITAL | Age: 88
LOS: 6 days | Discharge: NURSING, REHAB OR LONG-TERM CARE | DRG: 682 | End: 2023-05-24
Attending: EMERGENCY MEDICINE | Admitting: HOSPITALIST
Payer: MEDICARE

## 2023-05-18 ENCOUNTER — APPOINTMENT (OUTPATIENT)
Dept: GENERAL RADIOLOGY | Facility: HOSPITAL | Age: 88
DRG: 682 | End: 2023-05-18
Attending: EMERGENCY MEDICINE
Payer: MEDICARE

## 2023-05-18 ENCOUNTER — HOSPITAL ENCOUNTER (INPATIENT)
Facility: HOSPITAL | Age: 88
LOS: 6 days | Discharge: NURSING, REHAB OR LONG-TERM CARE | End: 2023-05-24
Attending: EMERGENCY MEDICINE | Admitting: HOSPITALIST
Payer: MEDICARE

## 2023-05-18 DIAGNOSIS — R77.8 ELEVATED TROPONIN: ICD-10-CM

## 2023-05-18 DIAGNOSIS — R53.1 WEAKNESS GENERALIZED: Primary | ICD-10-CM

## 2023-05-18 DIAGNOSIS — S20.229A CONTUSION OF BACK, UNSPECIFIED LATERALITY, INITIAL ENCOUNTER: ICD-10-CM
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
## 2023-05-18 PROBLEM — R79.89 ELEVATED TROPONIN: Status: ACTIVE | Noted: 2023-05-18

## 2023-05-18 PROBLEM — E87.0 HYPEROSMOLALITY AND HYPERNATREMIA: Status: ACTIVE | Noted: 2023-05-18

## 2023-05-18 LAB
ALBUMIN SERPL-MCNC: 3.3 G/DL (ref 3.4–5)
ALBUMIN/GLOB SERPL: 1 {RATIO} (ref 1–2)
ALP LIVER SERPL-CCNC: 113 U/L
ALT SERPL-CCNC: 42 U/L
ANION GAP SERPL CALC-SCNC: 6 MMOL/L (ref 0–18)
AST SERPL-CCNC: 60 U/L (ref 15–37)
BASOPHILS # BLD AUTO: 0.02 X10(3) UL (ref 0–0.2)
BASOPHILS NFR BLD AUTO: 0.2 %
BILIRUB SERPL-MCNC: 2 MG/DL (ref 0.1–2)
BILIRUB UR QL: NEGATIVE
BUN BLD-MCNC: 39 MG/DL (ref 7–18)
BUN/CREAT SERPL: 36.4 (ref 10–20)
CALCIUM BLD-MCNC: 9.4 MG/DL (ref 8.5–10.1)
CHLORIDE SERPL-SCNC: 116 MMOL/L (ref 98–112)
CHOLEST SERPL-MCNC: 179 MG/DL (ref ?–200)
CK SERPL-CCNC: 755 U/L
CLARITY UR: CLEAR
CO2 SERPL-SCNC: 24 MMOL/L (ref 21–32)
COLOR UR: YELLOW
CREAT BLD-MCNC: 1.07 MG/DL
DEPRECATED RDW RBC AUTO: 53.6 FL (ref 35.1–46.3)
EOSINOPHIL # BLD AUTO: 0 X10(3) UL (ref 0–0.7)
EOSINOPHIL NFR BLD AUTO: 0 %
ERYTHROCYTE [DISTWIDTH] IN BLOOD BY AUTOMATED COUNT: 14.8 % (ref 11–15)
GFR SERPLBLD BASED ON 1.73 SQ M-ARVRAT: 50 ML/MIN/1.73M2 (ref 60–?)
GLOBULIN PLAS-MCNC: 3.2 G/DL (ref 2.8–4.4)
GLUCOSE BLD-MCNC: 124 MG/DL (ref 70–99)
GLUCOSE BLDC GLUCOMTR-MCNC: 116 MG/DL (ref 70–99)
GLUCOSE UR-MCNC: 30 MG/DL
HCT VFR BLD AUTO: 45.2 %
HDLC SERPL-MCNC: 74 MG/DL (ref 40–59)
HGB BLD-MCNC: 15.5 G/DL
IMM GRANULOCYTES # BLD AUTO: 0.08 X10(3) UL (ref 0–1)
IMM GRANULOCYTES NFR BLD: 0.6 %
LDLC SERPL CALC-MCNC: 87 MG/DL (ref ?–100)
LEUKOCYTE ESTERASE UR QL STRIP.AUTO: NEGATIVE
LYMPHOCYTES # BLD AUTO: 0.44 X10(3) UL (ref 1–4)
LYMPHOCYTES NFR BLD AUTO: 3.4 %
MCH RBC QN AUTO: 33.5 PG (ref 26–34)
MCHC RBC AUTO-ENTMCNC: 34.3 G/DL (ref 31–37)
MCV RBC AUTO: 97.6 FL
MONOCYTES # BLD AUTO: 1.06 X10(3) UL (ref 0.1–1)
MONOCYTES NFR BLD AUTO: 8.1 %
NEUTROPHILS # BLD AUTO: 11.46 X10 (3) UL (ref 1.5–7.7)
NEUTROPHILS # BLD AUTO: 11.46 X10(3) UL (ref 1.5–7.7)
NEUTROPHILS NFR BLD AUTO: 87.7 %
NITRITE UR QL STRIP.AUTO: NEGATIVE
NONHDLC SERPL-MCNC: 105 MG/DL (ref ?–130)
OSMOLALITY SERPL CALC.SUM OF ELEC: 313 MOSM/KG (ref 275–295)
PH UR: 5.5 [PH] (ref 5–8)
PLATELET # BLD AUTO: 165 10(3)UL (ref 150–450)
POTASSIUM SERPL-SCNC: 3.8 MMOL/L (ref 3.5–5.1)
PROT SERPL-MCNC: 6.5 G/DL (ref 6.4–8.2)
PROT UR-MCNC: 100 MG/DL
Q-T INTERVAL: 432 MS
QRS DURATION: 84 MS
QTC CALCULATION (BEZET): 560 MS
R AXIS: -3 DEGREES
RBC # BLD AUTO: 4.63 X10(6)UL
SODIUM SERPL-SCNC: 146 MMOL/L (ref 136–145)
SP GR UR STRIP: >1.03 (ref 1–1.03)
T AXIS: -15 DEGREES
TRIGL SERPL-MCNC: 101 MG/DL (ref 30–149)
TROPONIN I HIGH SENSITIVITY: 206 NG/L
TROPONIN I HIGH SENSITIVITY: 231 NG/L
UROBILINOGEN UR STRIP-ACNC: NORMAL
VENTRICULAR RATE: 101 BPM
VLDLC SERPL CALC-MCNC: 16 MG/DL (ref 0–30)
WBC # BLD AUTO: 13.1 X10(3) UL (ref 4–11)

## 2023-05-18 PROCEDURE — 71045 X-RAY EXAM CHEST 1 VIEW: CPT | Performed by: EMERGENCY MEDICINE

## 2023-05-18 PROCEDURE — 72128 CT CHEST SPINE W/O DYE: CPT | Performed by: EMERGENCY MEDICINE

## 2023-05-18 PROCEDURE — 73560 X-RAY EXAM OF KNEE 1 OR 2: CPT | Performed by: EMERGENCY MEDICINE

## 2023-05-18 PROCEDURE — 72131 CT LUMBAR SPINE W/O DYE: CPT | Performed by: EMERGENCY MEDICINE

## 2023-05-18 PROCEDURE — 99223 1ST HOSP IP/OBS HIGH 75: CPT | Performed by: HOSPITALIST

## 2023-05-18 PROCEDURE — 70450 CT HEAD/BRAIN W/O DYE: CPT | Performed by: EMERGENCY MEDICINE

## 2023-05-18 PROCEDURE — 72125 CT NECK SPINE W/O DYE: CPT | Performed by: EMERGENCY MEDICINE

## 2023-05-18 PROCEDURE — 73523 X-RAY EXAM HIPS BI 5/> VIEWS: CPT | Performed by: EMERGENCY MEDICINE

## 2023-05-18 RX ORDER — DEXTROSE AND SODIUM CHLORIDE 5; .45 G/100ML; G/100ML
INJECTION, SOLUTION INTRAVENOUS CONTINUOUS
Status: DISCONTINUED | OUTPATIENT
Start: 2023-05-18 | End: 2023-05-20

## 2023-05-18 RX ORDER — DULOXETIN HYDROCHLORIDE 20 MG/1
20 CAPSULE, DELAYED RELEASE ORAL DAILY
Status: DISCONTINUED | OUTPATIENT
Start: 2023-05-18 | End: 2023-05-24

## 2023-05-18 RX ORDER — LOSARTAN POTASSIUM 100 MG/1
100 TABLET ORAL ONCE
Status: COMPLETED | OUTPATIENT
Start: 2023-05-18 | End: 2023-05-18

## 2023-05-18 RX ORDER — ACETAMINOPHEN 500 MG
500 TABLET ORAL EVERY 4 HOURS PRN
Status: DISCONTINUED | OUTPATIENT
Start: 2023-05-18 | End: 2023-05-24

## 2023-05-18 RX ORDER — ROSUVASTATIN CALCIUM 20 MG/1
40 TABLET, COATED ORAL DAILY
Status: DISCONTINUED | OUTPATIENT
Start: 2023-05-18 | End: 2023-05-19

## 2023-05-18 RX ORDER — METOCLOPRAMIDE HYDROCHLORIDE 5 MG/ML
5 INJECTION INTRAMUSCULAR; INTRAVENOUS EVERY 8 HOURS PRN
Status: DISCONTINUED | OUTPATIENT
Start: 2023-05-18 | End: 2023-05-20

## 2023-05-18 RX ORDER — ONDANSETRON 2 MG/ML
4 INJECTION INTRAMUSCULAR; INTRAVENOUS EVERY 6 HOURS PRN
Status: DISCONTINUED | OUTPATIENT
Start: 2023-05-18 | End: 2023-05-24

## 2023-05-18 RX ORDER — LEVOTHYROXINE SODIUM 0.05 MG/1
50 TABLET ORAL
Status: DISCONTINUED | OUTPATIENT
Start: 2023-05-19 | End: 2023-05-24

## 2023-05-18 RX ORDER — DILTIAZEM HYDROCHLORIDE 180 MG/1
180 CAPSULE, COATED, EXTENDED RELEASE ORAL DAILY
COMMUNITY
Start: 2023-03-27 | End: 2023-05-24

## 2023-05-18 RX ORDER — HEPARIN SODIUM 5000 [USP'U]/ML
5000 INJECTION, SOLUTION INTRAVENOUS; SUBCUTANEOUS EVERY 12 HOURS SCHEDULED
Status: DISCONTINUED | OUTPATIENT
Start: 2023-05-18 | End: 2023-05-20

## 2023-05-18 RX ORDER — DILTIAZEM HYDROCHLORIDE 180 MG/1
180 CAPSULE, EXTENDED RELEASE ORAL DAILY
Status: DISCONTINUED | OUTPATIENT
Start: 2023-05-18 | End: 2023-05-23

## 2023-05-18 RX ORDER — SODIUM CHLORIDE 9 MG/ML
125 INJECTION, SOLUTION INTRAVENOUS CONTINUOUS
Status: DISCONTINUED | OUTPATIENT
Start: 2023-05-18 | End: 2023-05-19

## 2023-05-18 NOTE — ED QUICK NOTES
Orders for admission, patient is aware of plan and ready to go upstairs. Any questions, please call ED RN Tiffanie  at extension [phone]. Titratable drug(s) infusing:  Rate:NS @125ml/hr    LOC at time of transport:A/OX1 (Self only)      Other pertinent information- Lived at home alone. Prior to today, nephew states pt had a fall about two weeks onto concrete. Contusion noted to right side of forehead. Multiple bruises noted to right side of buttock and Family at bedside. CIWA score=0  NIH score=11-initially. Pt more alert now @1500 alert and oriented to family, time and self. X3. Speech more clear presently as well. Tolerated PO well (w/o straw).

## 2023-05-18 NOTE — ED QUICK NOTES
Nephew at bedside, [address]ir[address]rell[address]. Updated on plan of care. Dr. Adrienne Cancino to room as well. ER MD Soledad Rivera made aware of BP. Home meds to be given for BP. Care ongoing.

## 2023-05-18 NOTE — H&P
[facility]    PATIENT'S NAME: Rashad De Leon   ATTENDING PHYSICIAN: Renu Strange MD   PATIENT ACCOUNT#:   [de-identified]    LOCATION:  [place] [address]  MEDICAL RECORD #:   [id number]       YOB: 1934  ADMISSION DATE:       05/18/2023    HISTORY AND PHYSICAL EXAMINATION    CHIEF COMPLAINT:  Dehydration, hyperosmolar hypernatremia, encephalopathy, and possible urinary tract infection. HISTORY OF PRESENT ILLNESS:  The patient is an 80-year-old  female who lives by herself with chronic atrial fibrillation anticoagulated with Xarelto who was brought in today after she was found lying on the floor in her apartment after a well-being check was requested by her nephew. Upon arrival to the emergency room, she was drowsy and confused. CBC showed white blood cell count of 13.1 with left shift. Troponin 231. . Chemistry showed sodium 146, calculated osmolality of 313, GFR 50, BUN 39, creatinine 1.07, chloride 116, potassium 3.8. She had imaging studies including chest x-ray, hip x-rays, and right knee x-ray. Also CT scan of the lumbar and thoracic spine and cervical spine with CT scan of the brain all showed no acute abnormalities except for generalized osteoarthritis. PAST MEDICAL HISTORY:  Chronic atrial fibrillation anticoagulated with Xarelto, chronic kidney disease stage 3, hypertension, hypothyroidism, generalized osteoarthritis, osteoporosis, hyperlipidemia. PAST SURGICAL HISTORY:  Cataract procedure. MEDICATIONS:  Please see medication reconciliation list.  Not clear if the patient has taken her medications recently. ALLERGIES:  No known drug allergies. FAMILY HISTORY:  Unable to obtain from the patient. SOCIAL HISTORY:  Lives by herself. No tobacco, alcohol, or drug use. Per her nephew, she had multiple falls documented recently. The patient loses her balance easily. REVIEW OF SYSTEMS:  Difficult to obtain from the patient.   The patient lives by herself. She was last seen in her usual state of health around 3 to 4 days ago. She did not  the phone when her nephew called her yesterday. Today he requested a well-being checkup. Per her nephew, she has been mentally declining in the last several weeks to months. When she was found on the floor in her apartment today, he noticed multiple pieces of furniture were scattered around as if the patient was struggling to get up. She is actually confused at this point. She does not know how long she had been on the floor. Other 12-point review of systems unobtainable from the patient. PHYSICAL EXAMINATION:    GENERAL:  Alert. Confused. Able to answer simple questions. Very drowsy. VITAL SIGNS:  Temperature 97.3, pulse 79, respiratory rate 25, blood pressure 170/100, pulse ox 98% on room air. HEENT:  Atraumatic. Oropharynx clear, dry mucous membranes. Normal hard and soft palate. Eyes:  Anicteric sclerae. NECK:  Supple. No lymphadenopathy. Trachea midline. Full range of motion. LUNGS:  Clear to auscultation bilaterally. Normal respiratory effort. HEART:  Regular rate and rhythm. S1, S2 auscultated. No murmur. ABDOMEN:  Soft, nondistended, no tenderness. Positive bowel sounds. EXTREMITIES:  No edema, clubbing, or cyanosis. Ecchymosis noted on the right knee. NEUROLOGIC:  No clear focal defects. ASSESSMENT:    1.   Dehydration with hyperosmolar hypernatremia. 2.   Possible urinary tract infection. Urinalysis still pending. 3.   Hypertension. 4.   Chronic atrial fibrillation. 5.   Possible progressive dementia versus acute encephalopathy from an infectious process. PLAN:  The patient will be admitted to general medical floor. Gentle hydration; D5, 0.5 normal saline.   If urinalysis shows evidence of urinary tract infection, we will start her on IV antibiotics, Rocephin; hold her anticoagulation because of documented multiple falls, not a candidate for full anticoagulation at this point; obtain  to evaluate the patient regarding caregiver at home or placement in a nursing facility as she cannot be living on her own at this point. Case discussed with her nephew at the bedside who is her power of  for medical decisionmaking. We will place her on fall and aspiration precautions. Further recommendations to follow.     Dictated By Mary Sotomayor MD  d: 05/18/2023 13:36:34  t: 05/18/2023 13:46:29  Job [id number]  FB/

## 2023-05-18 NOTE — ED QUICK NOTES
Pt straight cath for urine specimen. Tolerated well. IVFs infusing at this time (NS) as urine dark/tonya colored, mucosa dry and CK lab results elevated. Care ongoing.

## 2023-05-18 NOTE — ED INITIAL ASSESSMENT (HPI)
Received via [facility] after being found on the floor at home. Informed by EMS pt down since Tuesday possibly. \"That was the last time neighbors saw or heard from her. \" Alert and oriented only to name and . Oral mucosa dry and tacky. Line in place. Care ongoing.

## 2023-05-19 ENCOUNTER — APPOINTMENT (OUTPATIENT)
Dept: CV DIAGNOSTICS | Facility: HOSPITAL | Age: 88
End: 2023-05-19
Attending: HOSPITALIST
Payer: MEDICARE

## 2023-05-19 ENCOUNTER — APPOINTMENT (OUTPATIENT)
Dept: ULTRASOUND IMAGING | Facility: HOSPITAL | Age: 88
DRG: 682 | End: 2023-05-19
Attending: HOSPITALIST
Payer: MEDICARE

## 2023-05-19 ENCOUNTER — APPOINTMENT (OUTPATIENT)
Dept: ULTRASOUND IMAGING | Facility: HOSPITAL | Age: 88
End: 2023-05-19
Attending: HOSPITALIST
Payer: MEDICARE

## 2023-05-19 ENCOUNTER — APPOINTMENT (OUTPATIENT)
Dept: CV DIAGNOSTICS | Facility: HOSPITAL | Age: 88
DRG: 682 | End: 2023-05-19
Attending: HOSPITALIST
Payer: MEDICARE

## 2023-05-19 LAB
ANION GAP SERPL CALC-SCNC: 6 MMOL/L (ref 0–18)
BASOPHILS # BLD AUTO: 0.02 X10(3) UL (ref 0–0.2)
BASOPHILS NFR BLD AUTO: 0.2 %
BUN BLD-MCNC: 38 MG/DL (ref 7–18)
BUN/CREAT SERPL: 45.2 (ref 10–20)
CALCIUM BLD-MCNC: 8.5 MG/DL (ref 8.5–10.1)
CHLORIDE SERPL-SCNC: 114 MMOL/L (ref 98–112)
CO2 SERPL-SCNC: 23 MMOL/L (ref 21–32)
CREAT BLD-MCNC: 0.84 MG/DL
DEPRECATED RDW RBC AUTO: 53.5 FL (ref 35.1–46.3)
EOSINOPHIL # BLD AUTO: 0.04 X10(3) UL (ref 0–0.7)
EOSINOPHIL NFR BLD AUTO: 0.4 %
ERYTHROCYTE [DISTWIDTH] IN BLOOD BY AUTOMATED COUNT: 14.9 % (ref 11–15)
GFR SERPLBLD BASED ON 1.73 SQ M-ARVRAT: 67 ML/MIN/1.73M2 (ref 60–?)
GLUCOSE BLD-MCNC: 130 MG/DL (ref 70–99)
HCT VFR BLD AUTO: 42.3 %
HGB BLD-MCNC: 14 G/DL
IMM GRANULOCYTES # BLD AUTO: 0.1 X10(3) UL (ref 0–1)
IMM GRANULOCYTES NFR BLD: 0.9 %
LYMPHOCYTES # BLD AUTO: 0.47 X10(3) UL (ref 1–4)
LYMPHOCYTES NFR BLD AUTO: 4.4 %
MCH RBC QN AUTO: 32.8 PG (ref 26–34)
MCHC RBC AUTO-ENTMCNC: 33.1 G/DL (ref 31–37)
MCV RBC AUTO: 99.1 FL
MONOCYTES # BLD AUTO: 1 X10(3) UL (ref 0.1–1)
MONOCYTES NFR BLD AUTO: 9.3 %
NEUTROPHILS # BLD AUTO: 9.16 X10 (3) UL (ref 1.5–7.7)
NEUTROPHILS # BLD AUTO: 9.16 X10(3) UL (ref 1.5–7.7)
NEUTROPHILS NFR BLD AUTO: 84.8 %
OSMOLALITY SERPL CALC.SUM OF ELEC: 307 MOSM/KG (ref 275–295)
PLATELET # BLD AUTO: 140 10(3)UL (ref 150–450)
POTASSIUM SERPL-SCNC: 3.4 MMOL/L (ref 3.5–5.1)
RBC # BLD AUTO: 4.27 X10(6)UL
SODIUM SERPL-SCNC: 143 MMOL/L (ref 136–145)
TROPONIN I HIGH SENSITIVITY: 138 NG/L
WBC # BLD AUTO: 10.8 X10(3) UL (ref 4–11)

## 2023-05-19 PROCEDURE — 93880 EXTRACRANIAL BILAT STUDY: CPT | Performed by: HOSPITALIST

## 2023-05-19 PROCEDURE — 93306 TTE W/DOPPLER COMPLETE: CPT | Performed by: HOSPITALIST

## 2023-05-19 PROCEDURE — 99233 SBSQ HOSP IP/OBS HIGH 50: CPT | Performed by: HOSPITALIST

## 2023-05-19 RX ORDER — DULOXETINE 40 MG/1
40 CAPSULE, DELAYED RELEASE ORAL DAILY
Qty: 30 CAPSULE | Refills: 0 | Status: SHIPPED | OUTPATIENT
Start: 2023-05-19 | End: 2023-06-18

## 2023-05-19 RX ORDER — DULOXETIN HYDROCHLORIDE 20 MG/1
CAPSULE, DELAYED RELEASE ORAL
Qty: 30 CAPSULE | Refills: 0 | OUTPATIENT
Start: 2023-05-19

## 2023-05-19 RX ORDER — POTASSIUM CHLORIDE 20 MEQ/1
40 TABLET, EXTENDED RELEASE ORAL ONCE
Status: COMPLETED | OUTPATIENT
Start: 2023-05-19 | End: 2023-05-19

## 2023-05-19 NOTE — TELEPHONE ENCOUNTER
Refill Request    Medication request: DULoxetine (Cymbalta) DR cap 20 mg  TAKE 1 CAPSULE(20 MG) BY MOUTH DAILY     LOV:5/3/2023 Bryn Knight MD   Due back to clinic per last office note: 2 weeks  NOV: 6/6/2023 Bryn Knight MD      [place] 4/12/23 #30    Urine drug screen (if applicable): n/a  Pain contract: n/a    LOV plan (if weaning or changing medications):     Per 5/3/23 OV    \"We will try left facet injections. Last injections not too helpful. She is on Cymbalta 20. When the refill comes up I will increase it to 40. \"

## 2023-05-19 NOTE — PROGRESS NOTES
Patient received alert, oriented. Denies pain/discomfort. Aware of medications she is taking, but unsure when she last took them r/t being on the floor for possible two days. Nephew at bedside- poa. Skin assessment per orders. Cont x2 per patient report. Oriented to the room and the use of call light. Fall precautions in place.

## 2023-05-19 NOTE — PLAN OF CARE
Patient alert and oriented. 2D echo ordered today. PT/OT to see patient today. IVF running, IV ABX. Fall precautions in place. Call light within reach. Problem: Patient Centered Care  Goal: Patient preferences are identified and integrated in the patient's plan of care  Description: Interventions:  - What would you like us to know as we care for you? From home alone. - Provide timely, complete, and accurate information to patient/family  - Incorporate patient and family knowledge, values, beliefs, and cultural backgrounds into the planning and delivery of care  - Encourage patient/family to participate in care and decision-making at the level they choose  - Honor patient and family perspectives and choices  Outcome: Progressing     Problem: Patient/Family Goals  Goal: Patient/Family Long Term Goal  Description: Patient's Long Term Goal: Recover strength and ability to mobilize     Interventions:  - Monitor vitals  - Monitor appropriate labs  - Administer medications as ordered  - Follow MD's orders  - Update patient on plan of care   - Discharge planning      - See additional Care Plan goals for specific interventions  Outcome: Progressing  Goal: Patient/Family Short Term Goal  Description: Patient's Short Term Goal: Manage any pain or discomfort experiencing during admission.     Interventions:   - Monitor vitals  - Monitor appropriate labs  - Administer medications as ordered  - Follow MD's orders  - Update patient on plan of care   - Discharge planning      - See additional Care Plan goals for specific interventions  Outcome: Progressing     Problem: METABOLIC/FLUID AND ELECTROLYTES - ADULT  Goal: Electrolytes maintained within normal limits  Description: INTERVENTIONS:  - Monitor labs and rhythm and assess patient for signs and symptoms of electrolyte imbalances  - Administer electrolyte replacement as ordered  - Monitor response to electrolyte replacements, including rhythm and repeat lab results as appropriate  - Fluid restriction as ordered  - Instruct patient on fluid and nutrition restrictions as appropriate  Outcome: Progressing     Problem: SKIN/TISSUE INTEGRITY - ADULT  Goal: Skin integrity remains intact  Description: INTERVENTIONS  - Assess and document risk factors for pressure ulcer development  - Assess and document skin integrity  - Monitor for areas of redness and/or skin breakdown  - Initiate interventions, skin care algorithm/standards of care as needed  Outcome: Progressing     Problem: MUSCULOSKELETAL - ADULT  Goal: Return mobility to safest level of function  Description: INTERVENTIONS:  - Assess patient stability and activity tolerance for standing, transferring and ambulating w/ or w/o assistive devices  - Assist with transfers and ambulation using safe patient handling equipment as needed  - Ensure adequate protection for wounds/incisions during mobilization  - Obtain PT/OT consults as needed  - Advance activity as appropriate  - Communicate ordered activity level and limitations with patient/family  Outcome: Progressing     Problem: Impaired Functional Mobility  Goal: Achieve highest/safest level of mobility/gait  Description: Interventions:  - Assess patient's functional ability and stability  - Promote increasing activity/tolerance for mobility and gait  - Educate and engage patient/family in tolerated activity level and precautions  - Recommend use of  RW for transfers and ambulation  Outcome: Progressing     Problem: Impaired Activities of Daily Living  Goal: Achieve highest/safest level of independence in self care  Description: Interventions:  - Assess ability and encourage patient to participate in ADLs to maximize function  - Promote sitting position while performing ADLs such as feeding, grooming, and bathing  - Educate and encourage patient/family in tolerated functional activity level and precautions during self-care  - Encourage patient to incorporate impaired side during daily activities to promote function  Outcome: Progressing     Problem: PAIN - ADULT  Goal: Verbalizes/displays adequate comfort level or patient's stated pain goal  Description: INTERVENTIONS:  - Encourage pt to monitor pain and request assistance  - Assess pain using appropriate pain scale  - Administer analgesics based on type and severity of pain and evaluate response  - Implement non-pharmacological measures as appropriate and evaluate response  - Consider cultural and social influences on pain and pain management  - Manage/alleviate anxiety  - Utilize distraction and/or relaxation techniques  - Monitor for opioid side effects  - Notify MD/LIP if interventions unsuccessful or patient reports new pain  - Anticipate increased pain with activity and pre-medicate as appropriate  Outcome: Progressing     Problem: SAFETY ADULT - FALL  Goal: Free from fall injury  Description: INTERVENTIONS:  - Assess pt frequently for physical needs  - Identify cognitive and physical deficits and behaviors that affect risk of falls.   - [place] fall precautions as indicated by assessment.  - Educate pt/family on patient safety including physical limitations  - Instruct pt to call for assistance with activity based on assessment  - Modify environment to reduce risk of injury  - Provide assistive devices as appropriate  - Consider OT/PT consult to assist with strengthening/mobility  - Encourage toileting schedule  Outcome: Progressing     Problem: DISCHARGE PLANNING  Goal: Discharge to home or other facility with appropriate resources  Description: INTERVENTIONS:  - Identify barriers to discharge w/pt and caregiver  - Include patient/family/discharge partner in discharge planning  - Arrange for needed discharge resources and transportation as appropriate  - Identify discharge learning needs (meds, wound care, etc)  - Arrange for interpreters to assist at discharge as needed  - Consider post-discharge preferences of patient/family/discharge partner  - Complete POLST form as appropriate  - Assess patient's ability to be responsible for managing their own health  - Refer to Case Management Department for coordinating discharge planning if the patient needs post-hospital services based on physician/LIP order or complex needs related to functional status, cognitive ability or social support system  Outcome: Progressing

## 2023-05-19 NOTE — CM/SW NOTE
Department  notified of request for jose [facility] referrals started. Assigned CM/SW to follow up with pt/family on further discharge planning.      Enid Mcfarland   May 19, 2023   16:08

## 2023-05-19 NOTE — PLAN OF CARE
Problem: Patient Centered Care  Goal: Patient preferences are identified and integrated in the patient's plan of care  Description: Interventions:  - What would you like us to know as we care for you? From home alone. - Provide timely, complete, and accurate information to patient/family  - Incorporate patient and family knowledge, values, beliefs, and cultural backgrounds into the planning and delivery of care  - Encourage patient/family to participate in care and decision-making at the level they choose  - Honor patient and family perspectives and choices  Outcome: Progressing     Problem: Patient/Family Goals  Goal: Patient/Family Long Term Goal  Description: Patient's Long Term Goal: Recover strength and ability to mobilize     Interventions:  - Monitor vitals  - Monitor appropriate labs  - Administer medications as ordered  - Follow MD's orders  - Update patient on plan of care   - Discharge planning      - See additional Care Plan goals for specific interventions  Outcome: Progressing  Goal: Patient/Family Short Term Goal  Description: Patient's Short Term Goal: Manage any pain or discomfort experiencing during admission.     Interventions:   - Monitor vitals  - Monitor appropriate labs  - Administer medications as ordered  - Follow MD's orders  - Update patient on plan of care   - Discharge planning      - See additional Care Plan goals for specific interventions  Outcome: Progressing     Problem: METABOLIC/FLUID AND ELECTROLYTES - ADULT  Goal: Electrolytes maintained within normal limits  Description: INTERVENTIONS:  - Monitor labs and rhythm and assess patient for signs and symptoms of electrolyte imbalances  - Administer electrolyte replacement as ordered  - Monitor response to electrolyte replacements, including rhythm and repeat lab results as appropriate  - Fluid restriction as ordered  - Instruct patient on fluid and nutrition restrictions as appropriate  Outcome: Progressing     Problem: SKIN/TISSUE INTEGRITY - ADULT  Goal: Skin integrity remains intact  Description: INTERVENTIONS  - Assess and document risk factors for pressure ulcer development  - Assess and document skin integrity  - Monitor for areas of redness and/or skin breakdown  - Initiate interventions, skin care algorithm/standards of care as needed  Outcome: Progressing     Problem: MUSCULOSKELETAL - ADULT  Goal: Return mobility to safest level of function  Description: INTERVENTIONS:  - Assess patient stability and activity tolerance for standing, transferring and ambulating w/ or w/o assistive devices  - Assist with transfers and ambulation using safe patient handling equipment as needed  - Ensure adequate protection for wounds/incisions during mobilization  - Obtain PT/OT consults as needed  - Advance activity as appropriate  - Communicate ordered activity level and limitations with patient/family  Outcome: Progressing     Problem: Impaired Functional Mobility  Goal: Achieve highest/safest level of mobility/gait  Description: Interventions:  - Assess patient's functional ability and stability  - Promote increasing activity/tolerance for mobility and gait  - Educate and engage patient/family in tolerated activity level and precautions  Outcome: Progressing     Problem: Impaired Activities of Daily Living  Goal: Achieve highest/safest level of independence in self care  Description: Interventions:  - Assess ability and encourage patient to participate in ADLs to maximize function  - Promote sitting position while performing ADLs such as feeding, grooming, and bathing  - Educate and encourage patient/family in tolerated functional activity level and precautions during self-care  Outcome: Progressing     Problem: PAIN - ADULT  Goal: Verbalizes/displays adequate comfort level or patient's stated pain goal  Description: INTERVENTIONS:  - Encourage pt to monitor pain and request assistance  - Assess pain using appropriate pain scale  - Administer analgesics based on type and severity of pain and evaluate response  - Implement non-pharmacological measures as appropriate and evaluate response  - Consider cultural and social influences on pain and pain management  - Manage/alleviate anxiety  - Utilize distraction and/or relaxation techniques  - Monitor for opioid side effects  - Notify MD/LIP if interventions unsuccessful or patient reports new pain  - Anticipate increased pain with activity and pre-medicate as appropriate  Outcome: Progressing     Problem: SAFETY ADULT - FALL  Goal: Free from fall injury  Description: INTERVENTIONS:  - Assess pt frequently for physical needs  - Identify cognitive and physical deficits and behaviors that affect risk of falls.   - [place] fall precautions as indicated by assessment.  - Educate pt/family on patient safety including physical limitations  - Instruct pt to call for assistance with activity based on assessment  - Modify environment to reduce risk of injury  - Provide assistive devices as appropriate  - Consider OT/PT consult to assist with strengthening/mobility  - Encourage toileting schedule  Outcome: Progressing     Problem: DISCHARGE PLANNING  Goal: Discharge to home or other facility with appropriate resources  Description: INTERVENTIONS:  - Identify barriers to discharge w/pt and caregiver  - Include patient/family/discharge partner in discharge planning  - Arrange for needed discharge resources and transportation as appropriate  - Identify discharge learning needs (meds, wound care, etc)  - Arrange for interpreters to assist at discharge as needed  - Consider post-discharge preferences of patient/family/discharge partner  - Complete POLST form as appropriate  - Assess patient's ability to be responsible for managing their own health  - Refer to Case Management Department for coordinating discharge planning if the patient needs post-hospital services based on physician/LIP order or complex needs related to functional status, cognitive ability or social support system  Outcome: Progressing     No acute changes overnight, vital signs being monitored, IV fluids administered continuously, frequent rounding by nursing staff, appropriate safety precautions in place, call light within reach.

## 2023-05-20 LAB
ANION GAP SERPL CALC-SCNC: 5 MMOL/L (ref 0–18)
BUN BLD-MCNC: 26 MG/DL (ref 7–18)
BUN/CREAT SERPL: 32.9 (ref 10–20)
CALCIUM BLD-MCNC: 8.3 MG/DL (ref 8.5–10.1)
CHLORIDE SERPL-SCNC: 113 MMOL/L (ref 98–112)
CK SERPL-CCNC: 427 U/L
CO2 SERPL-SCNC: 22 MMOL/L (ref 21–32)
CREAT BLD-MCNC: 0.79 MG/DL
DEPRECATED RDW RBC AUTO: 53.4 FL (ref 35.1–46.3)
ERYTHROCYTE [DISTWIDTH] IN BLOOD BY AUTOMATED COUNT: 14.6 % (ref 11–15)
GFR SERPLBLD BASED ON 1.73 SQ M-ARVRAT: 72 ML/MIN/1.73M2 (ref 60–?)
GLUCOSE BLD-MCNC: 150 MG/DL (ref 70–99)
HCT VFR BLD AUTO: 40.4 %
HGB BLD-MCNC: 13.5 G/DL
MCH RBC QN AUTO: 32.9 PG (ref 26–34)
MCHC RBC AUTO-ENTMCNC: 33.4 G/DL (ref 31–37)
MCV RBC AUTO: 98.5 FL
OSMOLALITY SERPL CALC.SUM OF ELEC: 298 MOSM/KG (ref 275–295)
PLATELET # BLD AUTO: 119 10(3)UL (ref 150–450)
POTASSIUM SERPL-SCNC: 3.7 MMOL/L (ref 3.5–5.1)
POTASSIUM SERPL-SCNC: 3.7 MMOL/L (ref 3.5–5.1)
RBC # BLD AUTO: 4.1 X10(6)UL
SODIUM SERPL-SCNC: 140 MMOL/L (ref 136–145)
TSI SER-ACNC: 2.53 MIU/ML (ref 0.36–3.74)
VIT B12 SERPL-MCNC: 1975 PG/ML (ref 193–986)
WBC # BLD AUTO: 9.5 X10(3) UL (ref 4–11)

## 2023-05-20 PROCEDURE — 99233 SBSQ HOSP IP/OBS HIGH 50: CPT | Performed by: HOSPITALIST

## 2023-05-20 RX ORDER — QUETIAPINE FUMARATE 25 MG/1
25 TABLET, FILM COATED ORAL NIGHTLY
Status: DISCONTINUED | OUTPATIENT
Start: 2023-05-20 | End: 2023-05-24

## 2023-05-20 RX ORDER — HALOPERIDOL 5 MG/ML
1 INJECTION INTRAMUSCULAR EVERY 6 HOURS PRN
Status: DISCONTINUED | OUTPATIENT
Start: 2023-05-20 | End: 2023-05-24

## 2023-05-20 RX ORDER — LOSARTAN POTASSIUM 50 MG/1
50 TABLET ORAL DAILY
Status: DISCONTINUED | OUTPATIENT
Start: 2023-05-20 | End: 2023-05-22

## 2023-05-20 RX ORDER — ACETAMINOPHEN 325 MG/1
650 TABLET ORAL EVERY 6 HOURS PRN
Status: DISCONTINUED | OUTPATIENT
Start: 2023-05-20 | End: 2023-05-24

## 2023-05-20 RX ORDER — HYDRALAZINE HYDROCHLORIDE 20 MG/ML
10 INJECTION INTRAMUSCULAR; INTRAVENOUS EVERY 6 HOURS PRN
Status: DISCONTINUED | OUTPATIENT
Start: 2023-05-20 | End: 2023-05-24

## 2023-05-20 NOTE — PLAN OF CARE
Updated family on POC at bedside. Spoke with CW and they will give list tomorrow or Monday; family is aware. Fall precautions in place. Remote tele with 1 call r/t tachy with exertion. No c/o pain. Pt having moments of confusion; tried to reorient pt and spoke with nephew over the phone to help reassure/reorient pt. Problem: Patient Centered Care  Goal: Patient preferences are identified and integrated in the patient's plan of care  Description: Interventions:  - What would you like us to know as we care for you? From home alone. - Provide timely, complete, and accurate information to patient/family  - Incorporate patient and family knowledge, values, beliefs, and cultural backgrounds into the planning and delivery of care  - Encourage patient/family to participate in care and decision-making at the level they choose  - Honor patient and family perspectives and choices  Outcome: Progressing     Problem: Patient/Family Goals  Goal: Patient/Family Long Term Goal  Description: Patient's Long Term Goal: Recover strength and ability to mobilize     Interventions:  - Monitor vitals  - Monitor appropriate labs  - Administer medications as ordered  - Follow MD's orders  - Update patient on plan of care   - Discharge planning      - See additional Care Plan goals for specific interventions  Outcome: Progressing  Goal: Patient/Family Short Term Goal  Description: Patient's Short Term Goal: Manage any pain or discomfort experiencing during admission.     Interventions:   - Monitor vitals  - Monitor appropriate labs  - Administer medications as ordered  - Follow MD's orders  - Update patient on plan of care   - Discharge planning      - See additional Care Plan goals for specific interventions  Outcome: Progressing     Problem: METABOLIC/FLUID AND ELECTROLYTES - ADULT  Goal: Electrolytes maintained within normal limits  Description: INTERVENTIONS:  - Monitor labs and rhythm and assess patient for signs and symptoms of electrolyte imbalances  - Administer electrolyte replacement as ordered  - Monitor response to electrolyte replacements, including rhythm and repeat lab results as appropriate  - Fluid restriction as ordered  - Instruct patient on fluid and nutrition restrictions as appropriate  Outcome: Progressing     Problem: SKIN/TISSUE INTEGRITY - ADULT  Goal: Skin integrity remains intact  Description: INTERVENTIONS  - Assess and document risk factors for pressure ulcer development  - Assess and document skin integrity  - Monitor for areas of redness and/or skin breakdown  - Initiate interventions, skin care algorithm/standards of care as needed  Outcome: Progressing     Problem: MUSCULOSKELETAL - ADULT  Goal: Return mobility to safest level of function  Description: INTERVENTIONS:  - Assess patient stability and activity tolerance for standing, transferring and ambulating w/ or w/o assistive devices  - Assist with transfers and ambulation using safe patient handling equipment as needed  - Ensure adequate protection for wounds/incisions during mobilization  - Obtain PT/OT consults as needed  - Advance activity as appropriate  - Communicate ordered activity level and limitations with patient/family  Outcome: Not Progressing     Problem: Impaired Functional Mobility  Goal: Achieve highest/safest level of mobility/gait  Description: Interventions:  - Assess patient's functional ability and stability  - Promote increasing activity/tolerance for mobility and gait  - Educate and engage patient/family in tolerated activity level and precautions  - Recommend use of chair position in bed 3 times per day  Outcome: Not Progressing     Problem: Impaired Activities of Daily Living  Goal: Achieve highest/safest level of independence in self care  Description: Interventions:  - Assess ability and encourage patient to participate in ADLs to maximize function  - Promote sitting position while performing ADLs such as feeding, grooming, and bathing  - Educate and encourage patient/family in tolerated functional activity level and precautions during self-care  - Provide support under elbow of weak side to prevent shoulder subluxation  Outcome: Not Progressing     Problem: PAIN - ADULT  Goal: Verbalizes/displays adequate comfort level or patient's stated pain goal  Description: INTERVENTIONS:  - Encourage pt to monitor pain and request assistance  - Assess pain using appropriate pain scale  - Administer analgesics based on type and severity of pain and evaluate response  - Implement non-pharmacological measures as appropriate and evaluate response  - Consider cultural and social influences on pain and pain management  - Manage/alleviate anxiety  - Utilize distraction and/or relaxation techniques  - Monitor for opioid side effects  - Notify MD/LIP if interventions unsuccessful or patient reports new pain  - Anticipate increased pain with activity and pre-medicate as appropriate  Outcome: Progressing     Problem: SAFETY ADULT - FALL  Goal: Free from fall injury  Description: INTERVENTIONS:  - Assess pt frequently for physical needs  - Identify cognitive and physical deficits and behaviors that affect risk of falls.   - [place] fall precautions as indicated by assessment.  - Educate pt/family on patient safety including physical limitations  - Instruct pt to call for assistance with activity based on assessment  - Modify environment to reduce risk of injury  - Provide assistive devices as appropriate  - Consider OT/PT consult to assist with strengthening/mobility  - Encourage toileting schedule  Outcome: Progressing     Problem: DISCHARGE PLANNING  Goal: Discharge to home or other facility with appropriate resources  Description: INTERVENTIONS:  - Identify barriers to discharge w/pt and caregiver  - Include patient/family/discharge partner in discharge planning  - Arrange for needed discharge resources and transportation as appropriate  - Identify discharge learning needs (meds, wound care, etc)  - Arrange for interpreters to assist at discharge as needed  - Consider post-discharge preferences of patient/family/discharge partner  - Complete POLST form as appropriate  - Assess patient's ability to be responsible for managing their own health  - Refer to Case Management Department for coordinating discharge planning if the patient needs post-hospital services based on physician/LIP order or complex needs related to functional status, cognitive ability or social support system  Outcome: Progressing

## 2023-05-20 NOTE — PLAN OF CARE
Pt confused and asking for her . States \"I can't stay here. Where is he?\" Redirected often but pt is forgetful and needs reminders. Voids well when assisted to BR via rolling chair vs using Purewick. Asp[place] and fall precautions in place. Had a Carotid doppler (results pending). Plan is for subacute rehab. Problem: Patient Centered Care  Goal: Patient preferences are identified and integrated in the patient's plan of care  Description: Interventions:  - What would you like us to know as we care for you? From home alone. - Provide timely, complete, and accurate information to patient/family  - Incorporate patient and family knowledge, values, beliefs, and cultural backgrounds into the planning and delivery of care  - Encourage patient/family to participate in care and decision-making at the level they choose  - Honor patient and family perspectives and choices  Outcome: Progressing     Problem: Patient/Family Goals  Goal: Patient/Family Long Term Goal  Description: Patient's Long Term Goal: Recover strength and ability to mobilize     Interventions:  - Monitor vitals  - Monitor appropriate labs  - Administer medications as ordered  - Follow MD's orders  - Update patient on plan of care   - Discharge planning      - See additional Care Plan goals for specific interventions  Outcome: Progressing  Goal: Patient/Family Short Term Goal  Description: Patient's Short Term Goal: Manage any pain or discomfort experiencing during admission.     Interventions:   - Monitor vitals  - Monitor appropriate labs  - Administer medications as ordered  - Follow MD's orders  - Update patient on plan of care   - Discharge planning      - See additional Care Plan goals for specific interventions  Outcome: Progressing     Problem: METABOLIC/FLUID AND ELECTROLYTES - ADULT  Goal: Electrolytes maintained within normal limits  Description: INTERVENTIONS:  - Monitor labs and rhythm and assess patient for signs and symptoms of electrolyte imbalances  - Administer electrolyte replacement as ordered  - Monitor response to electrolyte replacements, including rhythm and repeat lab results as appropriate  - Fluid restriction as ordered  - Instruct patient on fluid and nutrition restrictions as appropriate  Outcome: Progressing     Problem: SKIN/TISSUE INTEGRITY - ADULT  Goal: Skin integrity remains intact  Description: INTERVENTIONS  - Assess and document risk factors for pressure ulcer development  - Assess and document skin integrity  - Monitor for areas of redness and/or skin breakdown  - Initiate interventions, skin care algorithm/standards of care as needed  Outcome: Progressing     Problem: MUSCULOSKELETAL - ADULT  Goal: Return mobility to safest level of function  Description: INTERVENTIONS:  - Assess patient stability and activity tolerance for standing, transferring and ambulating w/ or w/o assistive devices  - Assist with transfers and ambulation using safe patient handling equipment as needed  - Ensure adequate protection for wounds/incisions during mobilization  - Obtain PT/OT consults as needed  - Advance activity as appropriate  - Communicate ordered activity level and limitations with patient/family  Outcome: Progressing     Problem: Impaired Functional Mobility  Goal: Achieve highest/safest level of mobility/gait  Description: Interventions:  - Assess patient's functional ability and stability  - Promote increasing activity/tolerance for mobility and gait  - Educate and engage patient/family in tolerated activity level and precautions    Outcome: Progressing     Problem: Impaired Activities of Daily Living  Goal: Achieve highest/safest level of independence in self care  Description: Interventions:  - Assess ability and encourage patient to participate in ADLs to maximize function  - Promote sitting position while performing ADLs such as feeding, grooming, and bathing  - Educate and encourage patient/family in tolerated functional activity level and precautions during self-care    Outcome: Progressing     Problem: PAIN - ADULT  Goal: Verbalizes/displays adequate comfort level or patient's stated pain goal  Description: INTERVENTIONS:  - Encourage pt to monitor pain and request assistance  - Assess pain using appropriate pain scale  - Administer analgesics based on type and severity of pain and evaluate response  - Implement non-pharmacological measures as appropriate and evaluate response  - Consider cultural and social influences on pain and pain management  - Manage/alleviate anxiety  - Utilize distraction and/or relaxation techniques  - Monitor for opioid side effects  - Notify MD/LIP if interventions unsuccessful or patient reports new pain  - Anticipate increased pain with activity and pre-medicate as appropriate  Outcome: Progressing     Problem: SAFETY ADULT - FALL  Goal: Free from fall injury  Description: INTERVENTIONS:  - Assess pt frequently for physical needs  - Identify cognitive and physical deficits and behaviors that affect risk of falls.   - [place] fall precautions as indicated by assessment.  - Educate pt/family on patient safety including physical limitations  - Instruct pt to call for assistance with activity based on assessment  - Modify environment to reduce risk of injury  - Provide assistive devices as appropriate  - Consider OT/PT consult to assist with strengthening/mobility  - Encourage toileting schedule  Outcome: Progressing     Problem: DISCHARGE PLANNING  Goal: Discharge to home or other facility with appropriate resources  Description: INTERVENTIONS:  - Identify barriers to discharge w/pt and caregiver  - Include patient/family/discharge partner in discharge planning  - Arrange for needed discharge resources and transportation as appropriate  - Identify discharge learning needs (meds, wound care, etc)  - Arrange for interpreters to assist at discharge as needed  - Consider post-discharge preferences of patient/family/discharge partner  - Complete POLST form as appropriate  - Assess patient's ability to be responsible for managing their own health  - Refer to Case Management Department for coordinating discharge planning if the patient needs post-hospital services based on physician/LIP order or complex needs related to functional status, cognitive ability or social support system  Outcome: Progressing

## 2023-05-21 LAB
ANION GAP SERPL CALC-SCNC: 5 MMOL/L (ref 0–18)
BUN BLD-MCNC: 21 MG/DL (ref 7–18)
BUN/CREAT SERPL: 32.3 (ref 10–20)
CALCIUM BLD-MCNC: 8.7 MG/DL (ref 8.5–10.1)
CHLORIDE SERPL-SCNC: 112 MMOL/L (ref 98–112)
CO2 SERPL-SCNC: 23 MMOL/L (ref 21–32)
CREAT BLD-MCNC: 0.65 MG/DL
DEPRECATED RDW RBC AUTO: 51.2 FL (ref 35.1–46.3)
ERYTHROCYTE [DISTWIDTH] IN BLOOD BY AUTOMATED COUNT: 14.5 % (ref 11–15)
GFR SERPLBLD BASED ON 1.73 SQ M-ARVRAT: 85 ML/MIN/1.73M2 (ref 60–?)
GLUCOSE BLD-MCNC: 132 MG/DL (ref 70–99)
HCT VFR BLD AUTO: 39.2 %
HGB BLD-MCNC: 13.5 G/DL
MCH RBC QN AUTO: 33.3 PG (ref 26–34)
MCHC RBC AUTO-ENTMCNC: 34.4 G/DL (ref 31–37)
MCV RBC AUTO: 96.6 FL
OSMOLALITY SERPL CALC.SUM OF ELEC: 295 MOSM/KG (ref 275–295)
PLATELET # BLD AUTO: 118 10(3)UL (ref 150–450)
POTASSIUM SERPL-SCNC: 3.7 MMOL/L (ref 3.5–5.1)
RBC # BLD AUTO: 4.06 X10(6)UL
SODIUM SERPL-SCNC: 140 MMOL/L (ref 136–145)
WBC # BLD AUTO: 8.6 X10(3) UL (ref 4–11)

## 2023-05-21 PROCEDURE — 99233 SBSQ HOSP IP/OBS HIGH 50: CPT | Performed by: HOSPITALIST

## 2023-05-21 RX ORDER — MELATONIN
3 ONCE
Status: COMPLETED | OUTPATIENT
Start: 2023-05-21 | End: 2023-05-21

## 2023-05-21 NOTE — PLAN OF CARE
A/o x2. Forgetful. [place]. Purewick in place, voiding. X1 assist/ stand-pivot. Losartan given for high BP an hour recheck BP trend down 124/83. Fall precautions in place, zone two, call light in place. Plan is [facility] placement. Plan of care ongoing. Problem: Patient Centered Care  Goal: Patient preferences are identified and integrated in the patient's plan of care  Description: Interventions:  - What would you like us to know as we care for you? From home alone. - Provide timely, complete, and accurate information to patient/family  - Incorporate patient and family knowledge, values, beliefs, and cultural backgrounds into the planning and delivery of care  - Encourage patient/family to participate in care and decision-making at the level they choose  - Honor patient and family perspectives and choices  Outcome: Not Progressing     Problem: Patient/Family Goals  Goal: Patient/Family Long Term Goal  Description: Patient's Long Term Goal: Recover strength and ability to mobilize     Interventions:  - Monitor vitals  - Monitor appropriate labs  - Administer medications as ordered  - Follow MD's orders  - Update patient on plan of care   - Discharge planning      - See additional Care Plan goals for specific interventions  Outcome: Not Progressing  Goal: Patient/Family Short Term Goal  Description: Patient's Short Term Goal: Manage any pain or discomfort experiencing during admission.     Interventions:   - Monitor vitals  - Monitor appropriate labs  - Administer medications as ordered  - Follow MD's orders  - Update patient on plan of care   - Discharge planning      - See additional Care Plan goals for specific interventions  Outcome: Not Progressing     Problem: METABOLIC/FLUID AND ELECTROLYTES - ADULT  Goal: Electrolytes maintained within normal limits  Description: INTERVENTIONS:  - Monitor labs and rhythm and assess patient for signs and symptoms of electrolyte imbalances  - Administer electrolyte replacement as ordered  - Monitor response to electrolyte replacements, including rhythm and repeat lab results as appropriate  - Fluid restriction as ordered  - Instruct patient on fluid and nutrition restrictions as appropriate  Outcome: Not Progressing     Problem: SKIN/TISSUE INTEGRITY - ADULT  Goal: Skin integrity remains intact  Description: INTERVENTIONS  - Assess and document risk factors for pressure ulcer development  - Assess and document skin integrity  - Monitor for areas of redness and/or skin breakdown  - Initiate interventions, skin care algorithm/standards of care as needed  Outcome: Not Progressing     Problem: MUSCULOSKELETAL - ADULT  Goal: Return mobility to safest level of function  Description: INTERVENTIONS:  - Assess patient stability and activity tolerance for standing, transferring and ambulating w/ or w/o assistive devices  - Assist with transfers and ambulation using safe patient handling equipment as needed  - Ensure adequate protection for wounds/incisions during mobilization  - Obtain PT/OT consults as needed  - Advance activity as appropriate  - Communicate ordered activity level and limitations with patient/family  Outcome: Not Progressing     Problem: Impaired Functional Mobility  Goal: Achieve highest/safest level of mobility/gait  Description: Interventions:  - Assess patient's functional ability and stability  - Promote increasing activity/tolerance for mobility and gait  - Educate and engage patient/family in tolerated activity level and precautions    Outcome: Not Progressing     Problem: Impaired Activities of Daily Living  Goal: Achieve highest/safest level of independence in self care  Description: Interventions:  - Assess ability and encourage patient to participate in ADLs to maximize function  - Promote sitting position while performing ADLs such as feeding, grooming, and bathing  - Educate and encourage patient/family in tolerated functional activity level and precautions during self-care    Outcome: Not Progressing     Problem: PAIN - ADULT  Goal: Verbalizes/displays adequate comfort level or patient's stated pain goal  Description: INTERVENTIONS:  - Encourage pt to monitor pain and request assistance  - Assess pain using appropriate pain scale  - Administer analgesics based on type and severity of pain and evaluate response  - Implement non-pharmacological measures as appropriate and evaluate response  - Consider cultural and social influences on pain and pain management  - Manage/alleviate anxiety  - Utilize distraction and/or relaxation techniques  - Monitor for opioid side effects  - Notify MD/LIP if interventions unsuccessful or patient reports new pain  - Anticipate increased pain with activity and pre-medicate as appropriate  Outcome: Not Progressing     Problem: SAFETY ADULT - FALL  Goal: Free from fall injury  Description: INTERVENTIONS:  - Assess pt frequently for physical needs  - Identify cognitive and physical deficits and behaviors that affect risk of falls.   - [place] fall precautions as indicated by assessment.  - Educate pt/family on patient safety including physical limitations  - Instruct pt to call for assistance with activity based on assessment  - Modify environment to reduce risk of injury  - Provide assistive devices as appropriate  - Consider OT/PT consult to assist with strengthening/mobility  - Encourage toileting schedule  Outcome: Not Progressing     Problem: DISCHARGE PLANNING  Goal: Discharge to home or other facility with appropriate resources  Description: INTERVENTIONS:  - Identify barriers to discharge w/pt and caregiver  - Include patient/family/discharge partner in discharge planning  - Arrange for needed discharge resources and transportation as appropriate  - Identify discharge learning needs (meds, wound care, etc)  - Arrange for interpreters to assist at discharge as needed  - Consider post-discharge preferences of patient/family/discharge partner  - Complete POLST form as appropriate  - Assess patient's ability to be responsible for managing their own health  - Refer to Case Management Department for coordinating discharge planning if the patient needs post-hospital services based on physician/LIP order or complex needs related to functional status, cognitive ability or social support system  Outcome: Not Progressing

## 2023-05-21 NOTE — PLAN OF CARE
Pt reassured and reoriented on POC. No c/o pain. Pt had a low appetite with each meal. Nephew updated on POC. Problem: Patient Centered Care  Goal: Patient preferences are identified and integrated in the patient's plan of care  Description: Interventions:  - What would you like us to know as we care for you? [place]. - Provide timely, complete, and accurate information to patient/family  - Incorporate patient and family knowledge, values, beliefs, and cultural backgrounds into the planning and delivery of care  - Encourage patient/family to participate in care and decision-making at the level they choose  - Honor patient and family perspectives and choices  Outcome: Progressing     Problem: Patient/Family Goals  Goal: Patient/Family Long Term Goal  Description: Patient's Long Term Goal: Recover strength and ability to mobilize     Interventions:  - Monitor vitals  - Monitor appropriate labs  - Administer medications as ordered  - Follow MD's orders  - Update patient on plan of care   - Discharge planning      - See additional Care Plan goals for specific interventions  Outcome: Progressing  Goal: Patient/Family Short Term Goal  Description: Patient's Short Term Goal: Manage any pain or discomfort experiencing during admission.     Interventions:   - Monitor vitals  - Monitor appropriate labs  - Administer medications as ordered  - Follow MD's orders  - Update patient on plan of care   - Discharge planning      - See additional Care Plan goals for specific interventions  Outcome: Progressing     Problem: METABOLIC/FLUID AND ELECTROLYTES - ADULT  Goal: Electrolytes maintained within normal limits  Description: INTERVENTIONS:  - Monitor labs and rhythm and assess patient for signs and symptoms of electrolyte imbalances  - Administer electrolyte replacement as ordered  - Monitor response to electrolyte replacements, including rhythm and repeat lab results as appropriate  - Fluid restriction as ordered  - Instruct patient on fluid and nutrition restrictions as appropriate  Outcome: Progressing     Problem: SKIN/TISSUE INTEGRITY - ADULT  Goal: Skin integrity remains intact  Description: INTERVENTIONS  - Assess and document risk factors for pressure ulcer development  - Assess and document skin integrity  - Monitor for areas of redness and/or skin breakdown  - Initiate interventions, skin care algorithm/standards of care as needed  Outcome: Progressing     Problem: MUSCULOSKELETAL - ADULT  Goal: Return mobility to safest level of function  Description: INTERVENTIONS:  - Assess patient stability and activity tolerance for standing, transferring and ambulating w/ or w/o assistive devices  - Assist with transfers and ambulation using safe patient handling equipment as needed  - Ensure adequate protection for wounds/incisions during mobilization  - Obtain PT/OT consults as needed  - Advance activity as appropriate  - Communicate ordered activity level and limitations with patient/family  Outcome: Not Progressing     Problem: Impaired Functional Mobility  Goal: Achieve highest/safest level of mobility/gait  Description: Interventions:  - Assess patient's functional ability and stability  - Promote increasing activity/tolerance for mobility and gait  - Educate and engage patient/family in tolerated activity level and precautions  - Recommend patient transfer to bedside chair toward strongest side  Outcome: Not Progressing     Problem: Impaired Activities of Daily Living  Goal: Achieve highest/safest level of independence in self care  Description: Interventions:  - Assess ability and encourage patient to participate in ADLs to maximize function  - Promote sitting position while performing ADLs such as feeding, grooming, and bathing  - Educate and encourage patient/family in tolerated functional activity level and precautions during self-care  - Encourage patient to incorporate impaired side during daily activities to promote function  Outcome: Progressing     Problem: PAIN - ADULT  Goal: Verbalizes/displays adequate comfort level or patient's stated pain goal  Description: INTERVENTIONS:  - Encourage pt to monitor pain and request assistance  - Assess pain using appropriate pain scale  - Administer analgesics based on type and severity of pain and evaluate response  - Implement non-pharmacological measures as appropriate and evaluate response  - Consider cultural and social influences on pain and pain management  - Manage/alleviate anxiety  - Utilize distraction and/or relaxation techniques  - Monitor for opioid side effects  - Notify MD/LIP if interventions unsuccessful or patient reports new pain  - Anticipate increased pain with activity and pre-medicate as appropriate  Outcome: Progressing     Problem: SAFETY ADULT - FALL  Goal: Free from fall injury  Description: INTERVENTIONS:  - Assess pt frequently for physical needs  - Identify cognitive and physical deficits and behaviors that affect risk of falls.   - [place] fall precautions as indicated by assessment.  - Educate pt/family on patient safety including physical limitations  - Instruct pt to call for assistance with activity based on assessment  - Modify environment to reduce risk of injury  - Provide assistive devices as appropriate  - Consider OT/PT consult to assist with strengthening/mobility  - Encourage toileting schedule  Outcome: Progressing     Problem: DISCHARGE PLANNING  Goal: Discharge to home or other facility with appropriate resources  Description: INTERVENTIONS:  - Identify barriers to discharge w/pt and caregiver  - Include patient/family/discharge partner in discharge planning  - Arrange for needed discharge resources and transportation as appropriate  - Identify discharge learning needs (meds, wound care, etc)  - Arrange for interpreters to assist at discharge as needed  - Consider post-discharge preferences of patient/family/discharge partner  - Complete POLST form as appropriate  - Assess patient's ability to be responsible for managing their own health  - Refer to Case Management Department for coordinating discharge planning if the patient needs post-hospital services based on physician/LIP order or complex needs related to functional status, cognitive ability or social support system  Outcome: Progressing

## 2023-05-22 ENCOUNTER — APPOINTMENT (OUTPATIENT)
Dept: ULTRASOUND IMAGING | Facility: HOSPITAL | Age: 88
End: 2023-05-22
Attending: HOSPITALIST
Payer: MEDICARE

## 2023-05-22 ENCOUNTER — APPOINTMENT (OUTPATIENT)
Dept: ULTRASOUND IMAGING | Facility: HOSPITAL | Age: 88
DRG: 682 | End: 2023-05-22
Attending: HOSPITALIST
Payer: MEDICARE

## 2023-05-22 ENCOUNTER — TELEPHONE (OUTPATIENT)
Dept: INTERNAL MEDICINE CLINIC | Facility: CLINIC | Age: 88
End: 2023-05-22

## 2023-05-22 ENCOUNTER — APPOINTMENT (OUTPATIENT)
Dept: CT IMAGING | Facility: HOSPITAL | Age: 88
End: 2023-05-22
Attending: HOSPITALIST
Payer: MEDICARE

## 2023-05-22 ENCOUNTER — APPOINTMENT (OUTPATIENT)
Dept: CT IMAGING | Facility: HOSPITAL | Age: 88
DRG: 682 | End: 2023-05-22
Attending: HOSPITALIST
Payer: MEDICARE

## 2023-05-22 LAB
ALBUMIN SERPL-MCNC: 2.6 G/DL (ref 3.4–5)
ANION GAP SERPL CALC-SCNC: 6 MMOL/L (ref 0–18)
ANION GAP SERPL CALC-SCNC: 7 MMOL/L (ref 0–18)
APTT PPP: 28.1 SECONDS (ref 23.3–35.6)
APTT PPP: >240 SECONDS (ref 23.3–35.6)
BASOPHILS # BLD AUTO: 0.03 X10(3) UL (ref 0–0.2)
BASOPHILS NFR BLD AUTO: 0.3 %
BUN BLD-MCNC: 20 MG/DL (ref 7–18)
BUN BLD-MCNC: 20 MG/DL (ref 7–18)
BUN/CREAT SERPL: 28.2 (ref 10–20)
BUN/CREAT SERPL: 32.8 (ref 10–20)
CALCIUM BLD-MCNC: 8.4 MG/DL (ref 8.5–10.1)
CALCIUM BLD-MCNC: 8.5 MG/DL (ref 8.5–10.1)
CHLORIDE SERPL-SCNC: 109 MMOL/L (ref 98–112)
CHLORIDE SERPL-SCNC: 111 MMOL/L (ref 98–112)
CO2 SERPL-SCNC: 23 MMOL/L (ref 21–32)
CO2 SERPL-SCNC: 23 MMOL/L (ref 21–32)
CREAT BLD-MCNC: 0.61 MG/DL
CREAT BLD-MCNC: 0.71 MG/DL
DEPRECATED RDW RBC AUTO: 50.8 FL (ref 35.1–46.3)
DEPRECATED RDW RBC AUTO: 52.1 FL (ref 35.1–46.3)
EOSINOPHIL # BLD AUTO: 0.08 X10(3) UL (ref 0–0.7)
EOSINOPHIL NFR BLD AUTO: 0.9 %
ERYTHROCYTE [DISTWIDTH] IN BLOOD BY AUTOMATED COUNT: 14.6 % (ref 11–15)
ERYTHROCYTE [DISTWIDTH] IN BLOOD BY AUTOMATED COUNT: 14.7 % (ref 11–15)
GFR SERPLBLD BASED ON 1.73 SQ M-ARVRAT: 82 ML/MIN/1.73M2 (ref 60–?)
GFR SERPLBLD BASED ON 1.73 SQ M-ARVRAT: 86 ML/MIN/1.73M2 (ref 60–?)
GLUCOSE BLD-MCNC: 126 MG/DL (ref 70–99)
GLUCOSE BLD-MCNC: 158 MG/DL (ref 70–99)
HCT VFR BLD AUTO: 41.9 %
HCT VFR BLD AUTO: 43.2 %
HGB BLD-MCNC: 14.2 G/DL
HGB BLD-MCNC: 14.9 G/DL
IMM GRANULOCYTES # BLD AUTO: 0.13 X10(3) UL (ref 0–1)
IMM GRANULOCYTES NFR BLD: 1.5 %
LYMPHOCYTES # BLD AUTO: 0.57 X10(3) UL (ref 1–4)
LYMPHOCYTES NFR BLD AUTO: 6.6 %
MAGNESIUM SERPL-MCNC: 1.6 MG/DL (ref 1.6–2.6)
MCH RBC QN AUTO: 32.9 PG (ref 26–34)
MCH RBC QN AUTO: 33.2 PG (ref 26–34)
MCHC RBC AUTO-ENTMCNC: 33.9 G/DL (ref 31–37)
MCHC RBC AUTO-ENTMCNC: 34.5 G/DL (ref 31–37)
MCV RBC AUTO: 96.2 FL
MCV RBC AUTO: 97.2 FL
MONOCYTES # BLD AUTO: 0.68 X10(3) UL (ref 0.1–1)
MONOCYTES NFR BLD AUTO: 7.9 %
NEUTROPHILS # BLD AUTO: 7.17 X10 (3) UL (ref 1.5–7.7)
NEUTROPHILS # BLD AUTO: 7.17 X10(3) UL (ref 1.5–7.7)
NEUTROPHILS NFR BLD AUTO: 82.8 %
OSMOLALITY SERPL CALC.SUM OF ELEC: 292 MOSM/KG (ref 275–295)
OSMOLALITY SERPL CALC.SUM OF ELEC: 296 MOSM/KG (ref 275–295)
PHOSPHATE SERPL-MCNC: 2.3 MG/DL (ref 2.5–4.9)
PLATELET # BLD AUTO: 121 10(3)UL (ref 150–450)
PLATELET # BLD AUTO: 124 10(3)UL (ref 150–450)
POTASSIUM SERPL-SCNC: 3.6 MMOL/L (ref 3.5–5.1)
POTASSIUM SERPL-SCNC: 3.7 MMOL/L (ref 3.5–5.1)
RBC # BLD AUTO: 4.31 X10(6)UL
RBC # BLD AUTO: 4.49 X10(6)UL
SODIUM SERPL-SCNC: 138 MMOL/L (ref 136–145)
SODIUM SERPL-SCNC: 141 MMOL/L (ref 136–145)
WBC # BLD AUTO: 8.7 X10(3) UL (ref 4–11)
WBC # BLD AUTO: 9.7 X10(3) UL (ref 4–11)

## 2023-05-22 PROCEDURE — 93970 EXTREMITY STUDY: CPT | Performed by: HOSPITALIST

## 2023-05-22 PROCEDURE — 99223 1ST HOSP IP/OBS HIGH 75: CPT | Performed by: INTERNAL MEDICINE

## 2023-05-22 PROCEDURE — 99233 SBSQ HOSP IP/OBS HIGH 50: CPT | Performed by: HOSPITALIST

## 2023-05-22 PROCEDURE — 71260 CT THORAX DX C+: CPT | Performed by: HOSPITALIST

## 2023-05-22 RX ORDER — QUETIAPINE FUMARATE 25 MG/1
25 TABLET, FILM COATED ORAL NIGHTLY
Qty: 30 TABLET | Refills: 0 | Status: SHIPPED | OUTPATIENT
Start: 2023-05-22 | End: 2023-05-22

## 2023-05-22 RX ORDER — HEPARIN SODIUM AND DEXTROSE 10000; 5 [USP'U]/100ML; G/100ML
INJECTION INTRAVENOUS CONTINUOUS
Status: DISCONTINUED | OUTPATIENT
Start: 2023-05-22 | End: 2023-05-23

## 2023-05-22 RX ORDER — HEPARIN SODIUM 1000 [USP'U]/ML
80 INJECTION, SOLUTION INTRAVENOUS; SUBCUTANEOUS ONCE
Status: COMPLETED | OUTPATIENT
Start: 2023-05-22 | End: 2023-05-22

## 2023-05-22 RX ORDER — LOSARTAN POTASSIUM 100 MG/1
100 TABLET ORAL DAILY
Status: DISCONTINUED | OUTPATIENT
Start: 2023-05-22 | End: 2023-05-24

## 2023-05-22 RX ORDER — MAGNESIUM OXIDE 400 MG/1
400 TABLET ORAL ONCE
Status: COMPLETED | OUTPATIENT
Start: 2023-05-22 | End: 2023-05-22

## 2023-05-22 RX ORDER — HEPARIN SODIUM AND DEXTROSE 10000; 5 [USP'U]/100ML; G/100ML
18 INJECTION INTRAVENOUS ONCE
Status: COMPLETED | OUTPATIENT
Start: 2023-05-22 | End: 2023-05-22

## 2023-05-22 NOTE — DISCHARGE INSTRUCTIONS
FU with PCP in 2 weeks  FU with Dr Nicholas Corona in 2 weeks.    Do not take xarelto until seen by cardiology (risks outweight benefits with recent falls)

## 2023-05-22 NOTE — TELEPHONE ENCOUNTER
Regarding Ambien: Dr. Demetrio Peña is prescribing physician for this medication, Manuel Mcdonnell will reach out to Dr. Hunter Province office.

## 2023-05-22 NOTE — PLAN OF CARE
Updated pt and pt's POA on poc. Educated to use call light if needs assistance. All safety measures in place. Pt had CT chest and US doppler BLE. Pt found to have multiple PE in right lung, and DVTs in BLE. Patient started on heparin drip. Per primary MD, patient still okay to perform activity as tolerated. Problem: Patient Centered Care  Goal: Patient preferences are identified and integrated in the patient's plan of care  Description: Interventions:  - What would you like us to know as we care for you? [place]. - Provide timely, complete, and accurate information to patient/family  - Incorporate patient and family knowledge, values, beliefs, and cultural backgrounds into the planning and delivery of care  - Encourage patient/family to participate in care and decision-making at the level they choose  - Honor patient and family perspectives and choices  Outcome: Progressing     Problem: Patient/Family Goals  Goal: Patient/Family Long Term Goal  Description: Patient's Long Term Goal: Recover strength and ability to mobilize     Interventions:  - Monitor vitals  - Monitor appropriate labs  - Administer medications as ordered  - Follow MD's orders  - Update patient on plan of care   - Discharge planning      - See additional Care Plan goals for specific interventions  Outcome: Progressing  Goal: Patient/Family Short Term Goal  Description: Patient's Short Term Goal: Manage any pain or discomfort experiencing during admission.     Interventions:   - Monitor vitals  - Monitor appropriate labs  - Administer medications as ordered  - Follow MD's orders  - Update patient on plan of care   - Discharge planning      - See additional Care Plan goals for specific interventions  Outcome: Progressing     Problem: METABOLIC/FLUID AND ELECTROLYTES - ADULT  Goal: Electrolytes maintained within normal limits  Description: INTERVENTIONS:  - Monitor labs and rhythm and assess patient for signs and symptoms of electrolyte imbalances  - Administer electrolyte replacement as ordered  - Monitor response to electrolyte replacements, including rhythm and repeat lab results as appropriate  - Fluid restriction as ordered  - Instruct patient on fluid and nutrition restrictions as appropriate  Outcome: Progressing     Problem: SKIN/TISSUE INTEGRITY - ADULT  Goal: Skin integrity remains intact  Description: INTERVENTIONS  - Assess and document risk factors for pressure ulcer development  - Assess and document skin integrity  - Monitor for areas of redness and/or skin breakdown  - Initiate interventions, skin care algorithm/standards of care as needed  Outcome: Progressing     Problem: MUSCULOSKELETAL - ADULT  Goal: Return mobility to safest level of function  Description: INTERVENTIONS:  - Assess patient stability and activity tolerance for standing, transferring and ambulating w/ or w/o assistive devices  - Assist with transfers and ambulation using safe patient handling equipment as needed  - Ensure adequate protection for wounds/incisions during mobilization  - Obtain PT/OT consults as needed  - Advance activity as appropriate  - Communicate ordered activity level and limitations with patient/family  Outcome: Progressing     Problem: Impaired Functional Mobility  Goal: Achieve highest/safest level of mobility/gait  Description: Interventions:  - Assess patient's functional ability and stability  - Promote increasing activity/tolerance for mobility and gait  - Educate and engage patient/family in tolerated activity level and precautions    Outcome: Progressing     Problem: Impaired Activities of Daily Living  Goal: Achieve highest/safest level of independence in self care  Description: Interventions:  - Assess ability and encourage patient to participate in ADLs to maximize function  - Promote sitting position while performing ADLs such as feeding, grooming, and bathing  - Educate and encourage patient/family in tolerated functional activity level and precautions during self-care  Outcome: Progressing     Problem: PAIN - ADULT  Goal: Verbalizes/displays adequate comfort level or patient's stated pain goal  Description: INTERVENTIONS:  - Encourage pt to monitor pain and request assistance  - Assess pain using appropriate pain scale  - Administer analgesics based on type and severity of pain and evaluate response  - Implement non-pharmacological measures as appropriate and evaluate response  - Consider cultural and social influences on pain and pain management  - Manage/alleviate anxiety  - Utilize distraction and/or relaxation techniques  - Monitor for opioid side effects  - Notify MD/LIP if interventions unsuccessful or patient reports new pain  - Anticipate increased pain with activity and pre-medicate as appropriate  Outcome: Progressing     Problem: SAFETY ADULT - FALL  Goal: Free from fall injury  Description: INTERVENTIONS:  - Assess pt frequently for physical needs  - Identify cognitive and physical deficits and behaviors that affect risk of falls.   - [place] fall precautions as indicated by assessment.  - Educate pt/family on patient safety including physical limitations  - Instruct pt to call for assistance with activity based on assessment  - Modify environment to reduce risk of injury  - Provide assistive devices as appropriate  - Consider OT/PT consult to assist with strengthening/mobility  - Encourage toileting schedule  Outcome: Progressing     Problem: DISCHARGE PLANNING  Goal: Discharge to home or other facility with appropriate resources  Description: INTERVENTIONS:  - Identify barriers to discharge w/pt and caregiver  - Include patient/family/discharge partner in discharge planning  - Arrange for needed discharge resources and transportation as appropriate  - Identify discharge learning needs (meds, wound care, etc)  - Arrange for interpreters to assist at discharge as needed  - Consider post-discharge preferences of patient/family/discharge partner  - Complete POLST form as appropriate  - Assess patient's ability to be responsible for managing their own health  - Refer to Case Management Department for coordinating discharge planning if the patient needs post-hospital services based on physician/LIP order or complex needs related to functional status, cognitive ability or social support system  Outcome: Progressing

## 2023-05-22 NOTE — TELEPHONE ENCOUNTER
Raghu Simpson from [facility] wondering if she is able to provide diagnosis for insomnia for medication     Fax: [phone]

## 2023-05-22 NOTE — CM/SW NOTE
05/22/23 1000   Discharge disposition   Expected discharge disposition [address] [facility]   Discharge [address]     Pt going to [facility], report: [phone]    Elias notified of transfer to SNF 1pm today. He is aware of medicar charges to be billed $35  PCS form done. Rapid covid test prior to discharge. CM/SW to remain available for support and/or discharge planning.     Bryan Funez RN, [facility]    Ext.  [phone]

## 2023-05-22 NOTE — PLAN OF CARE
A/o x2, forgetful. Denies pain. Fall precautions in place. Plan is LUCAS placement - park place. Melatonin given for sleep. Hydralazine given for elevated dBP, recheck bp 119/80. Call light within reach. Plan of care ongoing. Problem: Patient Centered Care  Goal: Patient preferences are identified and integrated in the patient's plan of care  Description: Interventions:  - What would you like us to know as we care for you? [place] home alone [place]. - Provide timely, complete, and accurate information to patient/family  - Incorporate patient and family knowledge, values, beliefs, and cultural backgrounds into the planning and delivery of care  - Encourage patient/family to participate in care and decision-making at the level they choose  - Honor patient and family perspectives and choices  Outcome: Progressing     Problem: Patient/Family Goals  Goal: Patient/Family Long Term Goal  Description: Patient's Long Term Goal: Recover strength and ability to mobilize     Interventions:  - Monitor vitals  - Monitor appropriate labs  - Administer medications as ordered  - Follow MD's orders  - Update patient on plan of care   - Discharge planning      - See additional Care Plan goals for specific interventions  Outcome: Progressing  Goal: Patient/Family Short Term Goal  Description: Patient's Short Term Goal: Manage any pain or discomfort experiencing during admission.     Interventions:   - Monitor vitals  - Monitor appropriate labs  - Administer medications as ordered  - Follow MD's orders  - Update patient on plan of care   - Discharge planning      - See additional Care Plan goals for specific interventions  Outcome: Progressing     Problem: METABOLIC/FLUID AND ELECTROLYTES - ADULT  Goal: Electrolytes maintained within normal limits  Description: INTERVENTIONS:  - Monitor labs and rhythm and assess patient for signs and symptoms of electrolyte imbalances  - Administer electrolyte replacement as ordered  - Monitor response to electrolyte replacements, including rhythm and repeat lab results as appropriate  - Fluid restriction as ordered  - Instruct patient on fluid and nutrition restrictions as appropriate  Outcome: Progressing     Problem: SKIN/TISSUE INTEGRITY - ADULT  Goal: Skin integrity remains intact  Description: INTERVENTIONS  - Assess and document risk factors for pressure ulcer development  - Assess and document skin integrity  - Monitor for areas of redness and/or skin breakdown  - Initiate interventions, skin care algorithm/standards of care as needed  Outcome: Progressing     Problem: MUSCULOSKELETAL - ADULT  Goal: Return mobility to safest level of function  Description: INTERVENTIONS:  - Assess patient stability and activity tolerance for standing, transferring and ambulating w/ or w/o assistive devices  - Assist with transfers and ambulation using safe patient handling equipment as needed  - Ensure adequate protection for wounds/incisions during mobilization  - Obtain PT/OT consults as needed  - Advance activity as appropriate  - Communicate ordered activity level and limitations with patient/family  Outcome: Progressing     Problem: Impaired Functional Mobility  Goal: Achieve highest/safest level of mobility/gait  Description: Interventions:  - Assess patient's functional ability and stability  - Promote increasing activity/tolerance for mobility and gait  - Educate and engage patient/family in tolerated activity level and precautions    Outcome: Progressing     Problem: Impaired Activities of Daily Living  Goal: Achieve highest/safest level of independence in self care  Description: Interventions:  - Assess ability and encourage patient to participate in ADLs to maximize function  - Promote sitting position while performing ADLs such as feeding, grooming, and bathing  - Educate and encourage patient/family in tolerated functional activity level and precautions during self-care    Outcome: Progressing     Problem: PAIN - ADULT  Goal: Verbalizes/displays adequate comfort level or patient's stated pain goal  Description: INTERVENTIONS:  - Encourage pt to monitor pain and request assistance  - Assess pain using appropriate pain scale  - Administer analgesics based on type and severity of pain and evaluate response  - Implement non-pharmacological measures as appropriate and evaluate response  - Consider cultural and social influences on pain and pain management  - Manage/alleviate anxiety  - Utilize distraction and/or relaxation techniques  - Monitor for opioid side effects  - Notify MD/LIP if interventions unsuccessful or patient reports new pain  - Anticipate increased pain with activity and pre-medicate as appropriate  Outcome: Progressing     Problem: SAFETY ADULT - FALL  Goal: Free from fall injury  Description: INTERVENTIONS:  - Assess pt frequently for physical needs  - Identify cognitive and physical deficits and behaviors that affect risk of falls.   - [place] fall precautions as indicated by assessment.  - Educate pt/family on patient safety including physical limitations  - Instruct pt to call for assistance with activity based on assessment  - Modify environment to reduce risk of injury  - Provide assistive devices as appropriate  - Consider OT/PT consult to assist with strengthening/mobility  - Encourage toileting schedule  Outcome: Progressing     Problem: DISCHARGE PLANNING  Goal: Discharge to home or other facility with appropriate resources  Description: INTERVENTIONS:  - Identify barriers to discharge w/pt and caregiver  - Include patient/family/discharge partner in discharge planning  - Arrange for needed discharge resources and transportation as appropriate  - Identify discharge learning needs (meds, wound care, etc)  - Arrange for interpreters to assist at discharge as needed  - Consider post-discharge preferences of patient/family/discharge partner  - Complete POLST form as appropriate  - Assess patient's ability to be responsible for managing their own health  - Refer to Case Management Department for coordinating discharge planning if the patient needs post-hospital services based on physician/LIP order or complex needs related to functional status, cognitive ability or social support system  Outcome: Progressing

## 2023-05-23 LAB
ANION GAP SERPL CALC-SCNC: 5 MMOL/L (ref 0–18)
APTT PPP: >240 SECONDS (ref 23.3–35.6)
BUN BLD-MCNC: 18 MG/DL (ref 7–18)
BUN/CREAT SERPL: 21.7 (ref 10–20)
CALCIUM BLD-MCNC: 9 MG/DL (ref 8.5–10.1)
CHLORIDE SERPL-SCNC: 107 MMOL/L (ref 98–112)
CO2 SERPL-SCNC: 27 MMOL/L (ref 21–32)
CREAT BLD-MCNC: 0.83 MG/DL
DEPRECATED RDW RBC AUTO: 53 FL (ref 35.1–46.3)
ERYTHROCYTE [DISTWIDTH] IN BLOOD BY AUTOMATED COUNT: 15 % (ref 11–15)
GFR SERPLBLD BASED ON 1.73 SQ M-ARVRAT: 68 ML/MIN/1.73M2 (ref 60–?)
GLUCOSE BLD-MCNC: 152 MG/DL (ref 70–99)
HCT VFR BLD AUTO: 44.7 %
HGB BLD-MCNC: 15.1 G/DL
MAGNESIUM SERPL-MCNC: 1.6 MG/DL (ref 1.6–2.6)
MCH RBC QN AUTO: 33 PG (ref 26–34)
MCHC RBC AUTO-ENTMCNC: 33.8 G/DL (ref 31–37)
MCV RBC AUTO: 97.6 FL
OSMOLALITY SERPL CALC.SUM OF ELEC: 293 MOSM/KG (ref 275–295)
PHOSPHATE SERPL-MCNC: 2.6 MG/DL (ref 2.5–4.9)
PLATELET # BLD AUTO: 140 10(3)UL (ref 150–450)
PLATELET # BLD AUTO: 170 10(3)UL (ref 150–450)
POTASSIUM SERPL-SCNC: 4.1 MMOL/L (ref 3.5–5.1)
RBC # BLD AUTO: 4.58 X10(6)UL
SODIUM SERPL-SCNC: 139 MMOL/L (ref 136–145)
WBC # BLD AUTO: 12.7 X10(3) UL (ref 4–11)

## 2023-05-23 PROCEDURE — 99233 SBSQ HOSP IP/OBS HIGH 50: CPT | Performed by: INTERNAL MEDICINE

## 2023-05-23 PROCEDURE — 99233 SBSQ HOSP IP/OBS HIGH 50: CPT | Performed by: HOSPITALIST

## 2023-05-23 RX ORDER — MAGNESIUM OXIDE 400 MG/1
400 TABLET ORAL ONCE
Status: COMPLETED | OUTPATIENT
Start: 2023-05-23 | End: 2023-05-23

## 2023-05-23 RX ORDER — DILTIAZEM HYDROCHLORIDE 5 MG/ML
INJECTION INTRAVENOUS
Status: COMPLETED
Start: 2023-05-23 | End: 2023-05-23

## 2023-05-23 RX ORDER — DILTIAZEM HYDROCHLORIDE 180 MG/1
180 CAPSULE, EXTENDED RELEASE ORAL DAILY
Status: DISCONTINUED | OUTPATIENT
Start: 2023-05-24 | End: 2023-05-24

## 2023-05-23 RX ORDER — DILTIAZEM HYDROCHLORIDE 120 MG/1
240 CAPSULE, EXTENDED RELEASE ORAL DAILY
Status: DISCONTINUED | OUTPATIENT
Start: 2023-05-24 | End: 2023-05-23

## 2023-05-23 RX ORDER — DILTIAZEM HYDROCHLORIDE 5 MG/ML
10 INJECTION INTRAVENOUS ONCE
Status: COMPLETED | OUTPATIENT
Start: 2023-05-23 | End: 2023-05-23

## 2023-05-23 NOTE — CM/SW NOTE
Per call of Dr Bob Childs pt plan to be ready to snf tomorrow 5/24/23. CM sent secure aidin message to [facility] Soheila Pinto CM/SW to remain available for support and/or discharge planning.     Davian Valenzuela RN, [facility]    Ext.  [phone]

## 2023-05-23 NOTE — PLAN OF CARE
Patient alert and oriented x2-3, confused. Increased heart rate this AM, MD paged, IV push Cardizem given by other RN. Heparin gtt stopped per MD, changed to PO xarelto. No complaints of pain. Call light within reach. All safety precautions in place. Frequent rounding by staff. Problem: Patient Centered Care  Goal: Patient preferences are identified and integrated in the patient's plan of care  Description: Interventions:  - What would you like us to know as we care for you? [place]. - Provide timely, complete, and accurate information to patient/family  - Incorporate patient and family knowledge, values, beliefs, and cultural backgrounds into the planning and delivery of care  - Encourage patient/family to participate in care and decision-making at the level they choose  - Honor patient and family perspectives and choices  Outcome: Progressing     Problem: Patient/Family Goals  Goal: Patient/Family Long Term Goal  Description: Patient's Long Term Goal: Recover strength and ability to mobilize     Interventions:  - Monitor vitals  - Monitor appropriate labs  - Administer medications as ordered  - Follow MD's orders  - Update patient on plan of care   - Discharge planning      - See additional Care Plan goals for specific interventions  Outcome: Progressing  Goal: Patient/Family Short Term Goal  Description: Patient's Short Term Goal: Manage any pain or discomfort experiencing during admission.     Interventions:   - Monitor vitals  - Monitor appropriate labs  - Administer medications as ordered  - Follow MD's orders  - Update patient on plan of care   - Discharge planning      - See additional Care Plan goals for specific interventions  Outcome: Progressing     Problem: METABOLIC/FLUID AND ELECTROLYTES - ADULT  Goal: Electrolytes maintained within normal limits  Description: INTERVENTIONS:  - Monitor labs and rhythm and assess patient for signs and symptoms of electrolyte imbalances  - Administer electrolyte replacement as ordered  - Monitor response to electrolyte replacements, including rhythm and repeat lab results as appropriate  - Fluid restriction as ordered  - Instruct patient on fluid and nutrition restrictions as appropriate  Outcome: Progressing     Problem: SKIN/TISSUE INTEGRITY - ADULT  Goal: Skin integrity remains intact  Description: INTERVENTIONS  - Assess and document risk factors for pressure ulcer development  - Assess and document skin integrity  - Monitor for areas of redness and/or skin breakdown  - Initiate interventions, skin care algorithm/standards of care as needed  Outcome: Progressing     Problem: MUSCULOSKELETAL - ADULT  Goal: Return mobility to safest level of function  Description: INTERVENTIONS:  - Assess patient stability and activity tolerance for standing, transferring and ambulating w/ or w/o assistive devices  - Assist with transfers and ambulation using safe patient handling equipment as needed  - Ensure adequate protection for wounds/incisions during mobilization  - Obtain PT/OT consults as needed  - Advance activity as appropriate  - Communicate ordered activity level and limitations with patient/family  Outcome: Progressing     Problem: Impaired Functional Mobility  Goal: Achieve highest/safest level of mobility/gait  Description: Interventions:  - Assess patient's functional ability and stability  - Promote increasing activity/tolerance for mobility and gait  - Educate and engage patient/family in tolerated activity level and precautions  - Recommend use of  RW for transfers and ambulation  Outcome: Progressing     Problem: Impaired Activities of Daily Living  Goal: Achieve highest/safest level of independence in self care  Description: Interventions:  - Assess ability and encourage patient to participate in ADLs to maximize function  - Promote sitting position while performing ADLs such as feeding, grooming, and bathing  - Educate and encourage patient/family in tolerated functional activity level and precautions during self-care  - Encourage patient to incorporate impaired side during daily activities to promote function  Outcome: Progressing     Problem: PAIN - ADULT  Goal: Verbalizes/displays adequate comfort level or patient's stated pain goal  Description: INTERVENTIONS:  - Encourage pt to monitor pain and request assistance  - Assess pain using appropriate pain scale  - Administer analgesics based on type and severity of pain and evaluate response  - Implement non-pharmacological measures as appropriate and evaluate response  - Consider cultural and social influences on pain and pain management  - Manage/alleviate anxiety  - Utilize distraction and/or relaxation techniques  - Monitor for opioid side effects  - Notify MD/LIP if interventions unsuccessful or patient reports new pain  - Anticipate increased pain with activity and pre-medicate as appropriate  Outcome: Progressing     Problem: SAFETY ADULT - FALL  Goal: Free from fall injury  Description: INTERVENTIONS:  - Assess pt frequently for physical needs  - Identify cognitive and physical deficits and behaviors that affect risk of falls.   - [place] fall precautions as indicated by assessment.  - Educate pt/family on patient safety including physical limitations  - Instruct pt to call for assistance with activity based on assessment  - Modify environment to reduce risk of injury  - Provide assistive devices as appropriate  - Consider OT/PT consult to assist with strengthening/mobility  - Encourage toileting schedule  Outcome: Progressing     Problem: DISCHARGE PLANNING  Goal: Discharge to home or other facility with appropriate resources  Description: INTERVENTIONS:  - Identify barriers to discharge w/pt and caregiver  - Include patient/family/discharge partner in discharge planning  - Arrange for needed discharge resources and transportation as appropriate  - Identify discharge learning needs (meds, wound care, etc)  - Arrange for interpreters to assist at discharge as needed  - Consider post-discharge preferences of patient/family/discharge partner  - Complete POLST form as appropriate  - Assess patient's ability to be responsible for managing their own health  - Refer to Case Management Department for coordinating discharge planning if the patient needs post-hospital services based on physician/LIP order or complex needs related to functional status, cognitive ability or social support system  Outcome: Progressing

## 2023-05-23 NOTE — PLAN OF CARE
Problem: Patient Centered Care  Goal: Patient preferences are identified and integrated in the patient's plan of care  Description: Interventions:  - What would you like us to know as we care for you? [place] home alone [place]. - Provide timely, complete, and accurate information to patient/family  - Incorporate patient and family knowledge, values, beliefs, and cultural backgrounds into the planning and delivery of care  - Encourage patient/family to participate in care and decision-making at the level they choose  - Honor patient and family perspectives and choices  Outcome: Progressing     Problem: Patient/Family Goals  Goal: Patient/Family Long Term Goal  Description: Patient's Long Term Goal: Recover strength and ability to mobilize     Interventions:  - Monitor vitals  - Monitor appropriate labs  - Administer medications as ordered  - Follow MD's orders  - Update patient on plan of care   - Discharge planning      - See additional Care Plan goals for specific interventions  Outcome: Progressing  Goal: Patient/Family Short Term Goal  Description: Patient's Short Term Goal: Manage any pain or discomfort experiencing during admission.     Interventions:   - Monitor vitals  - Monitor appropriate labs  - Administer medications as ordered  - Follow MD's orders  - Update patient on plan of care   - Discharge planning      - See additional Care Plan goals for specific interventions  Outcome: Progressing     Problem: METABOLIC/FLUID AND ELECTROLYTES - ADULT  Goal: Electrolytes maintained within normal limits  Description: INTERVENTIONS:  - Monitor labs and rhythm and assess patient for signs and symptoms of electrolyte imbalances  - Administer electrolyte replacement as ordered  - Monitor response to electrolyte replacements, including rhythm and repeat lab results as appropriate  - Fluid restriction as ordered  - Instruct patient on fluid and nutrition restrictions as appropriate  Outcome: Progressing Problem: SKIN/TISSUE INTEGRITY - ADULT  Goal: Skin integrity remains intact  Description: INTERVENTIONS  - Assess and document risk factors for pressure ulcer development  - Assess and document skin integrity  - Monitor for areas of redness and/or skin breakdown  - Initiate interventions, skin care algorithm/standards of care as needed  Outcome: Progressing     Problem: MUSCULOSKELETAL - ADULT  Goal: Return mobility to safest level of function  Description: INTERVENTIONS:  - Assess patient stability and activity tolerance for standing, transferring and ambulating w/ or w/o assistive devices  - Assist with transfers and ambulation using safe patient handling equipment as needed  - Ensure adequate protection for wounds/incisions during mobilization  - Obtain PT/OT consults as needed  - Advance activity as appropriate  - Communicate ordered activity level and limitations with patient/family  Outcome: Progressing     Problem: Impaired Functional Mobility  Goal: Achieve highest/safest level of mobility/gait  Description: Interventions:  - Assess patient's functional ability and stability  - Promote increasing activity/tolerance for mobility and gait  - Educate and engage patient/family in tolerated activity level and precautions  Outcome: Progressing     Problem: Impaired Activities of Daily Living  Goal: Achieve highest/safest level of independence in self care  Description: Interventions:  - Assess ability and encourage patient to participate in ADLs to maximize function  - Promote sitting position while performing ADLs such as feeding, grooming, and bathing  - Educate and encourage patient/family in tolerated functional activity level and precautions during self-care  Outcome: Progressing     Problem: PAIN - ADULT  Goal: Verbalizes/displays adequate comfort level or patient's stated pain goal  Description: INTERVENTIONS:  - Encourage pt to monitor pain and request assistance  - Assess pain using appropriate pain scale  - Administer analgesics based on type and severity of pain and evaluate response  - Implement non-pharmacological measures as appropriate and evaluate response  - Consider cultural and social influences on pain and pain management  - Manage/alleviate anxiety  - Utilize distraction and/or relaxation techniques  - Monitor for opioid side effects  - Notify MD/LIP if interventions unsuccessful or patient reports new pain  - Anticipate increased pain with activity and pre-medicate as appropriate  Outcome: Progressing     Problem: SAFETY ADULT - FALL  Goal: Free from fall injury  Description: INTERVENTIONS:  - Assess pt frequently for physical needs  - Identify cognitive and physical deficits and behaviors that affect risk of falls.   - [place] fall precautions as indicated by assessment.  - Educate pt/family on patient safety including physical limitations  - Instruct pt to call for assistance with activity based on assessment  - Modify environment to reduce risk of injury  - Provide assistive devices as appropriate  - Consider OT/PT consult to assist with strengthening/mobility  - Encourage toileting schedule  Outcome: Progressing     Problem: DISCHARGE PLANNING  Goal: Discharge to home or other facility with appropriate resources  Description: INTERVENTIONS:  - Identify barriers to discharge w/pt and caregiver  - Include patient/family/discharge partner in discharge planning  - Arrange for needed discharge resources and transportation as appropriate  - Identify discharge learning needs (meds, wound care, etc)  - Arrange for interpreters to assist at discharge as needed  - Consider post-discharge preferences of patient/family/discharge partner  - Complete POLST form as appropriate  - Assess patient's ability to be responsible for managing their own health  - Refer to Case Management Department for coordinating discharge planning if the patient needs post-hospital services based on physician/LIP order or complex needs related to functional status, cognitive ability or social support system  Outcome: Progressing     No acute changes overnight, vital signs being monitored, heparin drip infusing and adjusted according to orders, frequent rounding by nursing staff, appropriate safety precautions in place, call light within reach.

## 2023-05-23 NOTE — PHYSICAL THERAPY NOTE
Chart reviewed ---Pt with new DX of PE and DVT . Currently on heparin started at 13;15  5/22/23. Current PTT >240. PT will hold at this time follow with pt later as appropriate pending updated activity order and PTT lab values.   Attempt x 1

## 2023-05-24 VITALS
BODY MASS INDEX: 23.04 KG/M2 | DIASTOLIC BLOOD PRESSURE: 106 MMHG | SYSTOLIC BLOOD PRESSURE: 158 MMHG | TEMPERATURE: 98 F | OXYGEN SATURATION: 96 % | RESPIRATION RATE: 18 BRPM | WEIGHT: 160.94 LBS | HEART RATE: 119 BPM | HEIGHT: 70 IN

## 2023-05-24 LAB
MAGNESIUM SERPL-MCNC: 1.7 MG/DL (ref 1.6–2.6)
PLATELET # BLD AUTO: 150 10(3)UL (ref 150–450)
SARS-COV-2 RNA RESP QL NAA+PROBE: NOT DETECTED

## 2023-05-24 PROCEDURE — 99239 HOSP IP/OBS DSCHRG MGMT >30: CPT | Performed by: HOSPITALIST

## 2023-05-24 PROCEDURE — 99232 SBSQ HOSP IP/OBS MODERATE 35: CPT | Performed by: INTERNAL MEDICINE

## 2023-05-24 RX ORDER — DILTIAZEM HYDROCHLORIDE 180 MG/1
180 CAPSULE, COATED, EXTENDED RELEASE ORAL DAILY
Qty: 30 CAPSULE | Refills: 3 | Status: SHIPPED | OUTPATIENT
Start: 2023-05-25

## 2023-05-24 NOTE — CM/SW NOTE
05/22/23 1038   Discharge disposition   Expected discharge disposition [id number] [facility]   Discharge transportation [address]     The pt. Is scheduled to discharge to [facility] today 5/24 between 3-4p, via [address]. The pt's POA is aware of discharge and medicar costs.       PCS is complete, RN to print with AVS     Report [phone] room 204-2    Octavio España, [address] [place] [address]

## 2023-05-24 NOTE — PLAN OF CARE
DC per MD order. Report given to RN at [facility]; all questions answered. PIV removed; tolerated well. All belongings taken with pt and caregiver. Medicar transported pt via wheelchair. Pt stable at DC. Problem: Patient Centered Care  Goal: Patient preferences are identified and integrated in the patient's plan of care  Description: Interventions:  - What would you like us to know as we care for you? [place] alone [place]. - Provide timely, complete, and accurate information to patient/family  - Incorporate patient and family knowledge, values, beliefs, and cultural backgrounds into the planning and delivery of care  - Encourage patient/family to participate in care and decision-making at the level they choose  - Honor patient and family perspectives and choices  Outcome: Adequate for Discharge     Problem: Patient/Family Goals  Goal: Patient/Family Long Term Goal  Description: Patient's Long Term Goal: Recover strength and ability to mobilize     Interventions:  - Monitor vitals  - Monitor appropriate labs  - Administer medications as ordered  - Follow MD's orders  - Update patient on plan of care   - Discharge planning      - See additional Care Plan goals for specific interventions  Outcome: Adequate for Discharge  Goal: Patient/Family Short Term Goal  Description: Patient's Short Term Goal: Manage any pain or discomfort experiencing during admission.     Interventions:   - Monitor vitals  - Monitor appropriate labs  - Administer medications as ordered  - Follow MD's orders  - Update patient on plan of care   - Discharge planning      - See additional Care Plan goals for specific interventions  Outcome: Adequate for Discharge     Problem: METABOLIC/FLUID AND ELECTROLYTES - ADULT  Goal: Electrolytes maintained within normal limits  Description: INTERVENTIONS:  - Monitor labs and rhythm and assess patient for signs and symptoms of electrolyte imbalances  - Administer electrolyte replacement as ordered  - Monitor response to electrolyte replacements, including rhythm and repeat lab results as appropriate  - Fluid restriction as ordered  - Instruct patient on fluid and nutrition restrictions as appropriate  Outcome: Adequate for Discharge     Problem: SKIN/TISSUE INTEGRITY - ADULT  Goal: Skin integrity remains intact  Description: INTERVENTIONS  - Assess and document risk factors for pressure ulcer development  - Assess and document skin integrity  - Monitor for areas of redness and/or skin breakdown  - Initiate interventions, skin care algorithm/standards of care as needed  Outcome: Adequate for Discharge     Problem: MUSCULOSKELETAL - ADULT  Goal: Return mobility to safest level of function  Description: INTERVENTIONS:  - Assess patient stability and activity tolerance for standing, transferring and ambulating w/ or w/o assistive devices  - Assist with transfers and ambulation using safe patient handling equipment as needed  - Ensure adequate protection for wounds/incisions during mobilization  - Obtain PT/OT consults as needed  - Advance activity as appropriate  - Communicate ordered activity level and limitations with patient/family  Outcome: Adequate for Discharge     Problem: Impaired Functional Mobility  Goal: Achieve highest/safest level of mobility/gait  Description: Interventions:  - Assess patient's functional ability and stability  - Promote increasing activity/tolerance for mobility and gait  - Educate and engage patient/family in tolerated activity level and precautions  - Recommend use of sit-stand lift for transfers  Outcome: Adequate for Discharge     Problem: Impaired Activities of Daily Living  Goal: Achieve highest/safest level of independence in self care  Description: Interventions:  - Assess ability and encourage patient to participate in ADLs to maximize function  - Promote sitting position while performing ADLs such as feeding, grooming, and bathing  - Educate and encourage patient/family in tolerated functional activity level and precautions during self-care  - Provide support under elbow of weak side to prevent shoulder subluxation  Outcome: Adequate for Discharge     Problem: PAIN - ADULT  Goal: Verbalizes/displays adequate comfort level or patient's stated pain goal  Description: INTERVENTIONS:  - Encourage pt to monitor pain and request assistance  - Assess pain using appropriate pain scale  - Administer analgesics based on type and severity of pain and evaluate response  - Implement non-pharmacological measures as appropriate and evaluate response  - Consider cultural and social influences on pain and pain management  - Manage/alleviate anxiety  - Utilize distraction and/or relaxation techniques  - Monitor for opioid side effects  - Notify MD/LIP if interventions unsuccessful or patient reports new pain  - Anticipate increased pain with activity and pre-medicate as appropriate  Outcome: Adequate for Discharge     Problem: SAFETY ADULT - FALL  Goal: Free from fall injury  Description: INTERVENTIONS:  - Assess pt frequently for physical needs  - Identify cognitive and physical deficits and behaviors that affect risk of falls.   - [place] fall precautions as indicated by assessment.  - Educate pt/family on patient safety including physical limitations  - Instruct pt to call for assistance with activity based on assessment  - Modify environment to reduce risk of injury  - Provide assistive devices as appropriate  - Consider OT/PT consult to assist with strengthening/mobility  - Encourage toileting schedule  Outcome: Adequate for Discharge     Problem: DISCHARGE PLANNING  Goal: Discharge to home or other facility with appropriate resources  Description: INTERVENTIONS:  - Identify barriers to discharge w/pt and caregiver  - Include patient/family/discharge partner in discharge planning  - Arrange for needed discharge resources and transportation as appropriate  - Identify discharge learning needs (meds, wound care, etc)  - Arrange for interpreters to assist at discharge as needed  - Consider post-discharge preferences of patient/family/discharge partner  - Complete POLST form as appropriate  - Assess patient's ability to be responsible for managing their own health  - Refer to Case Management Department for coordinating discharge planning if the patient needs post-hospital services based on physician/LIP order or complex needs related to functional status, cognitive ability or social support system  Outcome: Adequate for Discharge

## 2023-05-24 NOTE — PROGRESS NOTES
Patient okay to be discharged per MD order, report to be called by primary RN, peripheral IV and remote tele removed by this RN, all paperwork filled out and printed to be picked up by [facility] at time of transport. Pt to be picked up by [facility] this evening with all belongings, pt and caregiver at bedside aware of discharge, pt stable at this time.

## 2023-05-24 NOTE — PLAN OF CARE
Problem: Patient Centered Care  Goal: Patient preferences are identified and integrated in the patient's plan of care  Description: Interventions:  - What would you like us to know as we care for you? [place] - Provide timely, complete, and accurate information to patient/family  - Incorporate patient and family knowledge, values, beliefs, and cultural backgrounds into the planning and delivery of care  - Encourage patient/family to participate in care and decision-making at the level they choose  - Honor patient and family perspectives and choices  Outcome: Progressing     Problem: Patient/Family Goals  Goal: Patient/Family Long Term Goal  Description: Patient's Long Term Goal: Recover strength and ability to mobilize     Interventions:  - Monitor vitals  - Monitor appropriate labs  - Administer medications as ordered  - Follow MD's orders  - Update patient on plan of care   - Discharge planning      - See additional Care Plan goals for specific interventions  Outcome: Progressing  Goal: Patient/Family Short Term Goal  Description: Patient's Short Term Goal: Manage any pain or discomfort experiencing during admission.     Interventions:   - Monitor vitals  - Monitor appropriate labs  - Administer medications as ordered  - Follow MD's orders  - Update patient on plan of care   - Discharge planning      - See additional Care Plan goals for specific interventions  Outcome: Progressing     Problem: METABOLIC/FLUID AND ELECTROLYTES - ADULT  Goal: Electrolytes maintained within normal limits  Description: INTERVENTIONS:  - Monitor labs and rhythm and assess patient for signs and symptoms of electrolyte imbalances  - Administer electrolyte replacement as ordered  - Monitor response to electrolyte replacements, including rhythm and repeat lab results as appropriate  - Fluid restriction as ordered  - Instruct patient on fluid and nutrition restrictions as appropriate  Outcome: Progressing Problem: SKIN/TISSUE INTEGRITY - ADULT  Goal: Skin integrity remains intact  Description: INTERVENTIONS  - Assess and document risk factors for pressure ulcer development  - Assess and document skin integrity  - Monitor for areas of redness and/or skin breakdown  - Initiate interventions, skin care algorithm/standards of care as needed  Outcome: Progressing     Problem: MUSCULOSKELETAL - ADULT  Goal: Return mobility to safest level of function  Description: INTERVENTIONS:  - Assess patient stability and activity tolerance for standing, transferring and ambulating w/ or w/o assistive devices  - Assist with transfers and ambulation using safe patient handling equipment as needed  - Ensure adequate protection for wounds/incisions during mobilization  - Obtain PT/OT consults as needed  - Advance activity as appropriate  - Communicate ordered activity level and limitations with patient/family  Outcome: Progressing     Problem: Impaired Functional Mobility  Goal: Achieve highest/safest level of mobility/gait  Description: Interventions:  - Assess patient's functional ability and stability  - Promote increasing activity/tolerance for mobility and gait  - Educate and engage patient/family in tolerated activity level and precautions  - Recommend use of sit-stand lift for transfers  Outcome: Progressing     Problem: Impaired Activities of Daily Living  Goal: Achieve highest/safest level of independence in self care  Description: Interventions:  - Assess ability and encourage patient to participate in ADLs to maximize function  - Promote sitting position while performing ADLs such as feeding, grooming, and bathing  - Educate and encourage patient/family in tolerated functional activity level and precautions during self-care  Outcome: Progressing     Problem: PAIN - ADULT  Goal: Verbalizes/displays adequate comfort level or patient's stated pain goal  Description: INTERVENTIONS:  - Encourage pt to monitor pain and request assistance  - Assess pain using appropriate pain scale  - Administer analgesics based on type and severity of pain and evaluate response  - Implement non-pharmacological measures as appropriate and evaluate response  - Consider cultural and social influences on pain and pain management  - Manage/alleviate anxiety  - Utilize distraction and/or relaxation techniques  - Monitor for opioid side effects  - Notify MD/LIP if interventions unsuccessful or patient reports new pain  - Anticipate increased pain with activity and pre-medicate as appropriate  Outcome: Progressing     Problem: SAFETY ADULT - FALL  Goal: Free from fall injury  Description: INTERVENTIONS:  - Assess pt frequently for physical needs  - Identify cognitive and physical deficits and behaviors that affect risk of falls.   - Clio fall precautions as indicated by assessment.  - Educate pt/family on patient safety including physical limitations  - Instruct pt to call for assistance with activity based on assessment  - Modify environment to reduce risk of injury  - Provide assistive devices as appropriate  - Consider OT/PT consult to assist with strengthening/mobility  - Encourage toileting schedule  Outcome: Progressing     Problem: DISCHARGE PLANNING  Goal: Discharge to home or other facility with appropriate resources  Description: INTERVENTIONS:  - Identify barriers to discharge w/pt and caregiver  - Include patient/family/discharge partner in discharge planning  - Arrange for needed discharge resources and transportation as appropriate  - Identify discharge learning needs (meds, wound care, etc)  - Arrange for interpreters to assist at discharge as needed  - Consider post-discharge preferences of patient/family/discharge partner  - Complete POLST form as appropriate  - Assess patient's ability to be responsible for managing their own health  - Refer to Case Management Department for coordinating discharge planning if the patient needs post-hospital services based on physician/LIP order or complex needs related to functional status, cognitive ability or social support system  Outcome: Progressing  VSS. No complaints of pain vocalized. Call light within reach. Bed alarm on. Safety measures in place.

## 2023-05-25 ENCOUNTER — INITIAL APN SNF VISIT (OUTPATIENT)
Dept: INTERNAL MEDICINE CLINIC | Facility: SKILLED NURSING FACILITY | Age: 88
End: 2023-05-25

## 2023-05-25 ENCOUNTER — TELEPHONE (OUTPATIENT)
Dept: CARDIOLOGY | Age: 88
End: 2023-05-25

## 2023-05-25 ENCOUNTER — TELEPHONIC VISIT (OUTPATIENT)
Dept: CARDIOLOGY | Age: 88
End: 2023-05-25

## 2023-05-25 DIAGNOSIS — M25.511 RIGHT SHOULDER PAIN, UNSPECIFIED CHRONICITY: ICD-10-CM

## 2023-05-25 DIAGNOSIS — I26.94 MULTIPLE SUBSEGMENTAL PULMONARY EMBOLI WITHOUT ACUTE COR PULMONALE (HCC): ICD-10-CM

## 2023-05-25 DIAGNOSIS — I48.21 PERMANENT ATRIAL FIBRILLATION (HCC): ICD-10-CM

## 2023-05-25 DIAGNOSIS — Z79.899 MEDICATION MANAGEMENT: ICD-10-CM

## 2023-05-25 DIAGNOSIS — Z87.39 HISTORY OF RHABDOMYOLYSIS: ICD-10-CM

## 2023-05-25 DIAGNOSIS — Z74.1 REQUIRES ASSISTANCE WITH ACTIVITIES OF DAILY LIVING (ADL): ICD-10-CM

## 2023-05-25 DIAGNOSIS — F03.A11 MILD DEMENTIA WITH AGITATION, UNSPECIFIED DEMENTIA TYPE (HCC): ICD-10-CM

## 2023-05-25 DIAGNOSIS — Z91.81 PERSONAL HISTORY OF FALLING, PRESENTING HAZARDS TO HEALTH: ICD-10-CM

## 2023-05-25 PROCEDURE — 1111F DSCHRG MED/CURRENT MED MERGE: CPT | Performed by: NURSE PRACTITIONER

## 2023-05-25 PROCEDURE — 99310 SBSQ NF CARE HIGH MDM 45: CPT | Performed by: NURSE PRACTITIONER

## 2023-05-26 ENCOUNTER — EXTERNAL FACILITY (OUTPATIENT)
Dept: INTERNAL MEDICINE CLINIC | Facility: CLINIC | Age: 88
End: 2023-05-26

## 2023-05-26 ENCOUNTER — TELEPHONE (OUTPATIENT)
Dept: CARDIOLOGY | Age: 88
End: 2023-05-26

## 2023-05-26 DIAGNOSIS — M54.50 CHRONIC BILATERAL LOW BACK PAIN WITHOUT SCIATICA: ICD-10-CM

## 2023-05-26 DIAGNOSIS — I48.21 PERMANENT ATRIAL FIBRILLATION (HCC): ICD-10-CM

## 2023-05-26 DIAGNOSIS — R77.8 ELEVATED TROPONIN: ICD-10-CM

## 2023-05-26 DIAGNOSIS — G89.29 CHRONIC BILATERAL LOW BACK PAIN WITHOUT SCIATICA: ICD-10-CM

## 2023-05-26 DIAGNOSIS — I10 ESSENTIAL HYPERTENSION: ICD-10-CM

## 2023-05-30 ENCOUNTER — SNF VISIT (OUTPATIENT)
Dept: INTERNAL MEDICINE CLINIC | Facility: SKILLED NURSING FACILITY | Age: 88
End: 2023-05-30

## 2023-05-30 ENCOUNTER — EXTERNAL FACILITY (OUTPATIENT)
Dept: INTERNAL MEDICINE CLINIC | Facility: CLINIC | Age: 88
End: 2023-05-30

## 2023-05-30 DIAGNOSIS — Z74.1 REQUIRES ASSISTANCE WITH ACTIVITIES OF DAILY LIVING (ADL): ICD-10-CM

## 2023-05-30 DIAGNOSIS — G89.29 CHRONIC BILATERAL LOW BACK PAIN WITHOUT SCIATICA: ICD-10-CM

## 2023-05-30 DIAGNOSIS — Z79.899 MEDICATION MANAGEMENT: ICD-10-CM

## 2023-05-30 DIAGNOSIS — I26.94 MULTIPLE SUBSEGMENTAL PULMONARY EMBOLI WITHOUT ACUTE COR PULMONALE (HCC): ICD-10-CM

## 2023-05-30 DIAGNOSIS — M54.50 CHRONIC BILATERAL LOW BACK PAIN WITHOUT SCIATICA: ICD-10-CM

## 2023-05-30 DIAGNOSIS — Z79.01 CURRENT USE OF LONG TERM ANTICOAGULATION: ICD-10-CM

## 2023-05-30 DIAGNOSIS — I48.21 PERMANENT ATRIAL FIBRILLATION (HCC): ICD-10-CM

## 2023-05-30 DIAGNOSIS — I10 ESSENTIAL HYPERTENSION: ICD-10-CM

## 2023-05-30 DIAGNOSIS — R53.1 WEAKNESS GENERALIZED: ICD-10-CM

## 2023-05-30 PROCEDURE — 1111F DSCHRG MED/CURRENT MED MERGE: CPT | Performed by: NURSE PRACTITIONER

## 2023-05-30 PROCEDURE — 99308 SBSQ NF CARE LOW MDM 20: CPT | Performed by: NURSE PRACTITIONER

## 2023-05-30 PROCEDURE — 99308 SBSQ NF CARE LOW MDM 20: CPT | Performed by: INTERNAL MEDICINE

## 2023-05-30 PROCEDURE — 1111F DSCHRG MED/CURRENT MED MERGE: CPT | Performed by: INTERNAL MEDICINE

## 2023-06-01 ENCOUNTER — MED REC SCAN ONLY (OUTPATIENT)
Dept: PHYSICAL MEDICINE AND REHAB | Facility: CLINIC | Age: 88
End: 2023-06-01

## 2023-06-05 ENCOUNTER — EXTERNAL FACILITY (OUTPATIENT)
Dept: INTERNAL MEDICINE CLINIC | Facility: CLINIC | Age: 88
End: 2023-06-05

## 2023-06-05 DIAGNOSIS — R53.1 WEAKNESS GENERALIZED: ICD-10-CM

## 2023-06-05 DIAGNOSIS — I10 ESSENTIAL HYPERTENSION: ICD-10-CM

## 2023-06-05 DIAGNOSIS — Z79.01 CURRENT USE OF LONG TERM ANTICOAGULATION: ICD-10-CM

## 2023-06-05 DIAGNOSIS — I48.21 PERMANENT ATRIAL FIBRILLATION (HCC): ICD-10-CM

## 2023-06-05 DIAGNOSIS — G89.29 CHRONIC BILATERAL LOW BACK PAIN WITHOUT SCIATICA: ICD-10-CM

## 2023-06-05 DIAGNOSIS — M54.50 CHRONIC BILATERAL LOW BACK PAIN WITHOUT SCIATICA: ICD-10-CM

## 2023-06-07 ENCOUNTER — EXTERNAL FACILITY (OUTPATIENT)
Dept: INTERNAL MEDICINE CLINIC | Facility: CLINIC | Age: 88
End: 2023-06-07

## 2023-06-07 DIAGNOSIS — I10 ESSENTIAL HYPERTENSION: ICD-10-CM

## 2023-06-07 DIAGNOSIS — M54.50 CHRONIC BILATERAL LOW BACK PAIN WITHOUT SCIATICA: ICD-10-CM

## 2023-06-07 DIAGNOSIS — S20.229A CONTUSION OF BACK, UNSPECIFIED LATERALITY, INITIAL ENCOUNTER: ICD-10-CM

## 2023-06-07 DIAGNOSIS — G89.29 CHRONIC BILATERAL LOW BACK PAIN WITHOUT SCIATICA: ICD-10-CM

## 2023-06-07 PROCEDURE — 1111F DSCHRG MED/CURRENT MED MERGE: CPT | Performed by: INTERNAL MEDICINE

## 2023-06-07 PROCEDURE — 99308 SBSQ NF CARE LOW MDM 20: CPT | Performed by: INTERNAL MEDICINE

## 2023-06-08 ENCOUNTER — SNF VISIT (OUTPATIENT)
Dept: INTERNAL MEDICINE CLINIC | Facility: SKILLED NURSING FACILITY | Age: 88
End: 2023-06-08

## 2023-06-08 DIAGNOSIS — Z78.9 POLST (PHYSICIAN ORDERS FOR LIFE-SUSTAINING TREATMENT): ICD-10-CM

## 2023-06-08 DIAGNOSIS — Z86.39: ICD-10-CM

## 2023-06-08 DIAGNOSIS — R53.1 WEAKNESS GENERALIZED: ICD-10-CM

## 2023-06-08 DIAGNOSIS — I10 ESSENTIAL HYPERTENSION: ICD-10-CM

## 2023-06-08 DIAGNOSIS — R29.6 RECURRENT FALLS: ICD-10-CM

## 2023-06-08 DIAGNOSIS — M54.50 CHRONIC BILATERAL LOW BACK PAIN WITHOUT SCIATICA: ICD-10-CM

## 2023-06-08 DIAGNOSIS — Z74.1 REQUIRES ASSISTANCE WITH ACTIVITIES OF DAILY LIVING (ADL): ICD-10-CM

## 2023-06-08 DIAGNOSIS — G89.29 CHRONIC BILATERAL LOW BACK PAIN WITHOUT SCIATICA: ICD-10-CM

## 2023-06-08 DIAGNOSIS — Z79.899 MEDICATION MANAGEMENT: ICD-10-CM

## 2023-06-08 PROCEDURE — 1111F DSCHRG MED/CURRENT MED MERGE: CPT | Performed by: NURSE PRACTITIONER

## 2023-06-08 PROCEDURE — 99309 SBSQ NF CARE MODERATE MDM 30: CPT | Performed by: NURSE PRACTITIONER

## 2023-06-09 ENCOUNTER — TELEPHONE (OUTPATIENT)
Dept: INTERNAL MEDICINE CLINIC | Facility: CLINIC | Age: 88
End: 2023-06-09

## 2023-06-09 NOTE — TELEPHONE ENCOUNTER
Corinne from [address] is calling to advise she will be faxing orders to PCP . Will PCP sign home health orders. Will be faxing information to PCP . Please advise.     Call Back # [phone]

## 2023-06-12 NOTE — TELEPHONE ENCOUNTER
Corinne, [facility] to clarify name of doctor, please call at [phone],[place].

## 2023-06-12 NOTE — TELEPHONE ENCOUNTER
Order provided to [facility] 78 nurse. Patient will be seen for admission today.

## 2023-06-16 ENCOUNTER — TELEMEDICINE (OUTPATIENT)
Dept: INTERNAL MEDICINE CLINIC | Facility: CLINIC | Age: 88
End: 2023-06-16

## 2023-06-16 ENCOUNTER — APPOINTMENT (OUTPATIENT)
Dept: CARDIOLOGY | Age: 88
End: 2023-06-16

## 2023-06-16 DIAGNOSIS — M47.816 LUMBAR SPONDYLOSIS: ICD-10-CM

## 2023-06-16 DIAGNOSIS — Z79.01 CURRENT USE OF LONG TERM ANTICOAGULATION: ICD-10-CM

## 2023-06-16 DIAGNOSIS — I10 ESSENTIAL HYPERTENSION: Primary | ICD-10-CM

## 2023-06-16 DIAGNOSIS — R53.1 WEAKNESS GENERALIZED: ICD-10-CM

## 2023-06-16 DIAGNOSIS — I48.21 PERMANENT ATRIAL FIBRILLATION (HCC): ICD-10-CM

## 2023-06-16 DIAGNOSIS — H35.30 ARMD (AGE-RELATED MACULAR DEGENERATION), BILATERAL: ICD-10-CM

## 2023-06-16 DIAGNOSIS — F32.9 REACTIVE DEPRESSION: ICD-10-CM

## 2023-06-16 DIAGNOSIS — N81.89 PELVIC FLOOR WEAKNESS: ICD-10-CM

## 2023-06-16 PROCEDURE — 99214 OFFICE O/P EST MOD 30 MIN: CPT | Performed by: INTERNAL MEDICINE

## 2023-06-16 RX ORDER — LOSARTAN POTASSIUM 100 MG/1
50 TABLET ORAL DAILY
Qty: 90 TABLET | Refills: 3 | COMMUNITY
Start: 2023-06-16

## 2023-06-26 ENCOUNTER — OFFICE VISIT (OUTPATIENT)
Dept: CARDIOLOGY | Age: 88
End: 2023-06-26

## 2023-06-26 VITALS — SYSTOLIC BLOOD PRESSURE: 89 MMHG | DIASTOLIC BLOOD PRESSURE: 53 MMHG | HEART RATE: 89 BPM

## 2023-06-26 DIAGNOSIS — Z09 HOSPITAL DISCHARGE FOLLOW-UP: Primary | ICD-10-CM

## 2023-06-26 DIAGNOSIS — I82.403 ACUTE DEEP VEIN THROMBOSIS (DVT) OF BOTH LOWER EXTREMITIES, UNSPECIFIED VEIN (CMD): ICD-10-CM

## 2023-06-26 DIAGNOSIS — I26.90 ACUTE SEPTIC PULMONARY EMBOLISM WITHOUT ACUTE COR PULMONALE (CMD): ICD-10-CM

## 2023-06-26 DIAGNOSIS — Z79.01 LONG TERM CURRENT USE OF ANTICOAGULANT: ICD-10-CM

## 2023-06-26 DIAGNOSIS — I48.20 CHRONIC ATRIAL FIBRILLATION (CMD): ICD-10-CM
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
## 2023-06-26 PROBLEM — G47.00 INSOMNIA: Status: RESOLVED | Noted: 2022-11-16 | Resolved: 2023-06-26

## 2023-06-26 PROBLEM — H40.003 GLAUCOMA SUSPECT OF BOTH EYES: Status: RESOLVED | Noted: 2018-12-28 | Resolved: 2023-06-26

## 2023-06-26 PROBLEM — S20.229A CONTUSION OF BACK, UNSPECIFIED LATERALITY, INITIAL ENCOUNTER: Status: RESOLVED | Noted: 2023-05-18 | Resolved: 2023-06-26

## 2023-06-26 PROBLEM — I26.99 ACUTE PULMONARY EMBOLISM WITHOUT ACUTE COR PULMONALE (CMD): Status: ACTIVE | Noted: 2023-06-26

## 2023-06-26 PROBLEM — R79.89 ELEVATED TROPONIN: Status: RESOLVED | Noted: 2023-05-18 | Resolved: 2023-06-26

## 2023-06-26 PROBLEM — E87.0 HYPEROSMOLALITY AND HYPERNATREMIA: Status: RESOLVED | Noted: 2023-05-18 | Resolved: 2023-06-26

## 2023-06-26 PROBLEM — R77.8 ELEVATED TROPONIN: Status: RESOLVED | Noted: 2023-05-18 | Resolved: 2023-06-26

## 2023-06-26 PROCEDURE — 99214 OFFICE O/P EST MOD 30 MIN: CPT | Performed by: NURSE PRACTITIONER

## 2023-06-26 RX ORDER — LOSARTAN POTASSIUM 25 MG/1
25 TABLET ORAL DAILY
Qty: 90 TABLET | Refills: 3 | Status: SHIPPED | OUTPATIENT
Start: 2023-06-26

## 2023-06-26 RX ORDER — DULOXETIN HYDROCHLORIDE 60 MG/1
60 CAPSULE, DELAYED RELEASE ORAL DAILY
COMMUNITY
Start: 2023-06-20

## 2023-06-26 SDOH — HEALTH STABILITY: PHYSICAL HEALTH: ON AVERAGE, HOW MANY DAYS PER WEEK DO YOU ENGAGE IN MODERATE TO STRENUOUS EXERCISE (LIKE A BRISK WALK)?: 0 DAYS

## 2023-06-26 SDOH — HEALTH STABILITY: PHYSICAL HEALTH: ON AVERAGE, HOW MANY MINUTES DO YOU ENGAGE IN EXERCISE AT THIS LEVEL?: 0 MIN

## 2023-06-26 ASSESSMENT — PATIENT HEALTH QUESTIONNAIRE - PHQ9
CLINICAL INTERPRETATION OF PHQ2 SCORE: NO FURTHER SCREENING NEEDED
2. FEELING DOWN, DEPRESSED OR HOPELESS: NOT AT ALL
1. LITTLE INTEREST OR PLEASURE IN DOING THINGS: NOT AT ALL
SUM OF ALL RESPONSES TO PHQ9 QUESTIONS 1 AND 2: 0
SUM OF ALL RESPONSES TO PHQ9 QUESTIONS 1 AND 2: 0

## 2023-06-27 ENCOUNTER — MED REC SCAN ONLY (OUTPATIENT)
Dept: INTERNAL MEDICINE CLINIC | Facility: CLINIC | Age: 88
End: 2023-06-27

## 2023-07-07 ENCOUNTER — TELEPHONE (OUTPATIENT)
Dept: INTERNAL MEDICINE CLINIC | Facility: CLINIC | Age: 88
End: 2023-07-07

## 2023-07-07 NOTE — TELEPHONE ENCOUNTER
Caregiver [place] parking placard for Dr Jackie Urrutia to fill out. Please call once completed for .  Placed in provider jaquan [email or web address]

## 2023-07-21 ENCOUNTER — TELEMEDICINE (OUTPATIENT)
Dept: INTERNAL MEDICINE CLINIC | Facility: CLINIC | Age: 88
End: 2023-07-21

## 2023-07-21 DIAGNOSIS — R26.9 GAIT ABNORMALITY: ICD-10-CM

## 2023-07-21 DIAGNOSIS — M47.816 LUMBAR SPONDYLOSIS: ICD-10-CM

## 2023-07-21 DIAGNOSIS — Z91.81 AT HIGH RISK FOR FALLS: ICD-10-CM

## 2023-07-21 DIAGNOSIS — Z86.711 HISTORY OF PULMONARY EMBOLUS (PE): ICD-10-CM

## 2023-07-21 DIAGNOSIS — I48.21 PERMANENT ATRIAL FIBRILLATION (HCC): ICD-10-CM

## 2023-07-21 DIAGNOSIS — R53.1 WEAKNESS GENERALIZED: ICD-10-CM

## 2023-07-21 DIAGNOSIS — Z86.718 HISTORY OF DVT (DEEP VEIN THROMBOSIS): ICD-10-CM

## 2023-07-21 DIAGNOSIS — I10 ESSENTIAL HYPERTENSION: Primary | ICD-10-CM

## 2023-07-21 DIAGNOSIS — F32.9 REACTIVE DEPRESSION: ICD-10-CM

## 2023-07-21 PROCEDURE — 99214 OFFICE O/P EST MOD 30 MIN: CPT | Performed by: INTERNAL MEDICINE

## 2023-07-31 NOTE — PROGRESS NOTES
Virtual Telephone Check-In    Venkat Wilson verbally consents to a Virtual/Telephone Check-In visit on 07/21/23. Patient has been referred to the [facility] website at [email or web address] to review the yearly Consent to Treat document. Patient understands and accepts financial responsibility for any deductible, co-insurance and/or co-pays associated with this service. Duration of the service: 30 minutes      Summary of topics discussed: follow up     Weakness  gait abnormality  High risk for fall   physical therapy at home ending  Pt able to transfer with minimal assit  can walk short distance with walker        Ross Trejo MD        HPI:    Patient ID: Venkat Wilson is a 80year old female. HPI  Hs of PE/DVT May 2023  pt found on floor dehydrated  On anticoagulation   underwent rehabilitaitonn  Pt was independent and drivinng prior to the fall  Apparently weaker dehydrated    and probably was not taking her meds properly    Now with 24 hour caregive  She is happy with her living siutaion  Exercises daily     There were no vitals taken for this visit. Wt Readings from Last 6 Encounters:  05/18/23 : 160 lb 15 oz (73 kg)  05/03/23 : 160 lb (72.6 kg)  04/28/23 : 160 lb (72.6 kg)  03/27/23 : 171 lb (77.6 kg)  03/17/23 : 171 lb (77.6 kg)  03/08/23 : 171 lb (77.6 kg)    There is no height or weight on file to calculate BMI. HGBA1C:    Lab Results   Component Value Date    A1C 5.3 12/06/2022    A1C 5.6 07/13/2022    A1C 5.8 (H) 05/18/2021     12/06/2022         Review of Systems   Constitutional:  Positive for fatigue. Negative for chills and fever. Respiratory:  Negative for chest tightness and shortness of breath. Cardiovascular:  Negative for chest pain. Gastrointestinal:  Negative for abdominal pain. Genitourinary:  Negative for difficulty urinating. Musculoskeletal:  Positive for back pain and gait problem. Neurological:  Positive for weakness.    Psychiatric/Behavioral:  Positive for sleep disturbance. Current Outpatient Medications   Medication Sig Dispense Refill    losartan 25 MG Oral Tab 1 tablet (25 mg total). DULoxetine HCl 60 MG Oral Capsule Delayed Release Sprinkle Take 60 mg by mouth daily. 90 capsule 1    rivaroxaban 20 MG Oral Tab Take 1 tablet (20 mg total) by mouth nightly. 30 tablet 0    dilTIAZem HCl ER Coated Beads 180 MG Oral Capsule SR 24 Hr Take 1 capsule (180 mg total) by mouth daily. 30 capsule 3    ergocalciferol 1.25 MG (78670 UT) Oral Cap Take 1 capsule (50,000 Units total) by mouth every 14 (fourteen) days.  6 capsule 3    levothyroxine 50 MCG Oral Tab TAKE 1 TABLET BY MOUTH EVERY MORNING BEFORE BREAKFAST 90 tablet 1    ROSUVASTATIN 40 MG Oral Tab TAKE 1 TABLET(40 MG) BY MOUTH DAILY 90 tablet 3     Allergies:No Known Allergies    HISTORY:  Past Medical History:   Diagnosis Date    Blepharitis 2007    Capsular opacification 2007    YAG laser    Cataract 2005    Cup to disc asymmetry 2007    Increased C/D ratio    Dry eyes, bilateral 1/16/2019    Essential hypertension     Glaucoma suspect of both eyes 12/28/2018    Hyperlipidemia     Map-dot-fingerprint corneal dystrophy 2007    Meibomian gland dysfunction 2007    Permanent atrial fibrillation ([address] 11/24/2014    Preglaucoma 11/23/2007    Pseudophakia of both eyes 12/28/2018    Tearing eyes 01/21/3319    Uncomplicated varicose veins 5/4/2005    Vitamin D deficiency     Vitreous floater 2007      Past Surgical History:   Procedure Laterality Date    CATARACT EXTRACTION W/  INTRAOCULAR LENS IMPLANT Right 02/09/2005    Dr. Zohaib Carpenter for near    CATARACT EXTRACTION W/  INTRAOCULAR LENS IMPLANT Left 03/02/2005    Dr. Servando Tripp Natasha Rangel for distance    YAG CAPSULOTOMY - OU - BOTH EYES  2007, 2008    OS 12/2/08 - JIMMIE, OD 3/1/07- Dr. Servando Tripp      Family History   Problem Relation Age of Onset    Other (Varicose veins) Mother     Other (Varicose veins) Father     Diabetes Neg     Glaucoma Neg Macular degeneration Neg       Social History:   Social History     Socioeconomic History    Marital status:    Tobacco Use    Smoking status: Never     Passive exposure: Never    Smokeless tobacco: Never   Vaping Use    Vaping Use: Never used   Substance and Sexual Activity    Alcohol use: No     Alcohol/week: 0.0 standard drinks of alcohol    Drug use: No   Other Topics Concern    Caffeine Concern Yes     Comment: coffee, ocassionally        PHYSICAL EXAM:    Physical Exam  Constitutional:       Appearance: She is not ill-appearing. Neurological:      Mental Status: She is alert.    Psychiatric:         Mood and Affect: Mood normal.     Can hold a conversation   sititng up    Appetire is good      caregiver on the video as well           ASSESSMENT/PLAN:   (I10) Essential hypertension  (primary encounter diagnosis)  Plan: monitor BP    (Z86.718) History of DVT (deep vein thrombosis)  Plan: cont anticoag    (Z86.711) History of pulmonary embolus (PE)  Plan: cont anticoag    (I48.21) Permanent atrial fibrillation ([address]  Plan: controlled  followed by cardiology mors recent notes reviwed  DR Newsome  EP    (M47.816) Lumbar spondylosis  Plan: chronic  pain controlled    (R53.1) Weakness generalized  Plan: cont rehabiltiaton  Consider outpatient physical therpy    (F32.9) Reactive depression  Plan: cont cymbalta       (Z91.81) At high risk for falls  Plan: fall preucaiotn    (R26.9) Gait abnormality  Plan: physical therapy as adivsed    Trying to get  a transportation  for transfer  Will let me know will order PT    Patient voiced understanding  and agrees with plan        Meds This Visit:  Requested Prescriptions      No prescriptions requested or ordered in this encounter       Imaging & Referrals:  None        #[phone]

## 2023-08-01 PROBLEM — Z91.81 PERSONAL HISTORY OF FALL: Status: ACTIVE | Noted: 2023-08-01

## 2023-08-01 PROBLEM — Z86.718 HISTORY OF DVT (DEEP VEIN THROMBOSIS): Status: ACTIVE | Noted: 2023-08-01

## 2023-08-01 PROBLEM — E03.9 ACQUIRED HYPOTHYROIDISM: Status: ACTIVE | Noted: 2023-08-01

## 2023-08-01 PROBLEM — Z86.711 HISTORY OF PULMONARY EMBOLISM: Status: ACTIVE | Noted: 2023-08-01

## 2023-08-12 NOTE — TELEPHONE ENCOUNTER
Pharmacy requesting refill     levothyroxine 50 MCG Oral Tab TAKE 1 TABLET BY MOUTH EVERY MORNING BEFORE BREAKFAST 90 tablet 1

## 2023-08-14 RX ORDER — LEVOTHYROXINE SODIUM 0.05 MG/1
50 TABLET ORAL
Qty: 90 TABLET | Refills: 1 | Status: SHIPPED | OUTPATIENT
Start: 2023-08-14

## 2023-08-14 NOTE — TELEPHONE ENCOUNTER
Refill passed per [address] [place] protocol.     Requested Prescriptions   Pending Prescriptions Disp Refills    levothyroxine 50 MCG Oral Tab 90 tablet 1     Sig: TAKE 1 TABLET BY MOUTH EVERY MORNING BEFORE BREAKFAST       Thyroid Medication Protocol Passed - 8/14/2023 11:32 AM        Passed - TSH in past 12 months        Passed - Last TSH value is normal     Lab Results   Component Value Date    TSH 2.530 05/20/2023                 Passed - In person appointment or virtual visit in the past 12 mos or appointment in next 3 mos     Recent Outpatient Visits              3 weeks ago Essential hypertension    Franchesca Singh MD    Telemedicine    1 month ago Essential hypertension    Franchesca Singh MD    Telemedicine    3 months ago Lumbar spondylosis    [facility] Frieda Glass MD    Office Visit    3 months ago Chronic bilateral low back pain without sciatica    Baron Coello MD    Office Visit    4 months ago Pelvic pain    Evi Coello MD    Office Visit                           Recent Outpatient Visits              3 weeks ago Essential hypertension    Franchesca Singh MD    Telemedicine    1 month ago Essential hypertension    Franchesca Singh MD    Telemedicine    3 months ago Lumbar spondylosis    [facility] Frieda Glass MD    Office Visit    3 months ago Chronic bilateral low back pain without sciatica    Sammy Coello Jacquelyne Che, MD    Office Visit    4 months ago Pelvic pain    Evi Coello MD    Office Visit

## 2023-08-15 ENCOUNTER — OFFICE VISIT (OUTPATIENT)
Dept: CARDIOLOGY | Age: 88
End: 2023-08-15

## 2023-08-15 VITALS
SYSTOLIC BLOOD PRESSURE: 98 MMHG | WEIGHT: 161.38 LBS | HEART RATE: 82 BPM | BODY MASS INDEX: 24.46 KG/M2 | HEIGHT: 68 IN | DIASTOLIC BLOOD PRESSURE: 63 MMHG

## 2023-08-15 DIAGNOSIS — E03.9 ACQUIRED HYPOTHYROIDISM: ICD-10-CM

## 2023-08-15 DIAGNOSIS — Z86.711 HISTORY OF PULMONARY EMBOLISM: ICD-10-CM

## 2023-08-15 DIAGNOSIS — R53.83 OTHER FATIGUE: ICD-10-CM

## 2023-08-15 DIAGNOSIS — Z91.81 PERSONAL HISTORY OF FALL: ICD-10-CM

## 2023-08-15 DIAGNOSIS — Z86.718 HISTORY OF DVT (DEEP VEIN THROMBOSIS): ICD-10-CM

## 2023-08-15 DIAGNOSIS — I48.20 CHRONIC ATRIAL FIBRILLATION (CMD): Primary | ICD-10-CM

## 2023-08-15 DIAGNOSIS — E78.2 HYPERLIPIDEMIA, MIXED: ICD-10-CM

## 2023-08-15 DIAGNOSIS — Z79.01 LONG TERM CURRENT USE OF ANTICOAGULANT: ICD-10-CM

## 2023-08-15 PROCEDURE — 99214 OFFICE O/P EST MOD 30 MIN: CPT | Performed by: INTERNAL MEDICINE

## 2023-08-15 SDOH — HEALTH STABILITY: PHYSICAL HEALTH: ON AVERAGE, HOW MANY DAYS PER WEEK DO YOU ENGAGE IN MODERATE TO STRENUOUS EXERCISE (LIKE A BRISK WALK)?: 0 DAYS

## 2023-08-15 SDOH — HEALTH STABILITY: MENTAL HEALTH: LITTLE INTEREST OR PLEASURE IN ACTIVITY?: NOT AT ALL

## 2023-08-15 SDOH — HEALTH STABILITY: PHYSICAL HEALTH: ON AVERAGE, HOW MANY MINUTES DO YOU ENGAGE IN EXERCISE AT THIS LEVEL?: 0 MIN

## 2023-08-15 SDOH — HEALTH STABILITY: MENTAL HEALTH: DEPRESSION SCREENING SCORE: 0

## 2023-08-15 SDOH — HEALTH STABILITY: MENTAL HEALTH: FEELING DOWN, DEPRESSED OR HOPELESS?: NOT AT ALL

## 2023-08-15 SDOH — HEALTH STABILITY: MENTAL HEALTH: PHQ2 INTERPRETATION: NO FURTHER SCREENING NEEDED

## 2023-08-15 ASSESSMENT — ENCOUNTER SYMPTOMS
FOCAL WEAKNESS: 0
ALLERGIC/IMMUNOLOGIC COMMENTS: NO NEW FOOD ALLERGIES
SUSPICIOUS LESIONS: 0
CHILLS: 0
NEAR-SYNCOPE: 0
SYNCOPE: 0
COUGH: 0
HEMATOCHEZIA: 0
WEIGHT LOSS: 0
LOSS OF BALANCE: 1
SHORTNESS OF BREATH: 0
DOUBLE VISION: 0
BRUISES/BLEEDS EASILY: 0
FEVER: 0
DEPRESSION: 0
WEIGHT GAIN: 0
LIGHT-HEADEDNESS: 0
HEMOPTYSIS: 0

## 2023-08-15 ASSESSMENT — PATIENT HEALTH QUESTIONNAIRE - PHQ9: SUM OF ALL RESPONSES TO PHQ9 QUESTIONS 1 AND 2: 0

## 2023-08-16 ENCOUNTER — TELEPHONE (OUTPATIENT)
Dept: INTERNAL MEDICINE CLINIC | Facility: CLINIC | Age: 88
End: 2023-08-16

## 2023-08-16 NOTE — TELEPHONE ENCOUNTER
John Bryant,  Ph: [phone]  Fax: [phone]  Received our fax below however it's of July. She needs June 2023 clinical notes,including June 16th.

## 2023-08-16 NOTE — TELEPHONE ENCOUNTER
OV Notes from 6/16/23 faxed to Krystle Miller from [address] [place] [address]. Pending confirmation

## 2023-08-16 NOTE — TELEPHONE ENCOUNTER
Corinne from [address] [place] [address] is calling request latest clinical notes faxed to:   lynne [facility].      FAX # [phone]

## 2023-08-17 DIAGNOSIS — I48.20 CHRONIC ATRIAL FIBRILLATION (CMD): ICD-10-CM

## 2023-08-18 NOTE — TELEPHONE ENCOUNTER
Refill passed per [facility], [facility] protocol. Requested Prescriptions   Pending Prescriptions Disp Refills    DULoxetine HCl 60 MG Oral Capsule Delayed Release Sprinkle 90 capsule 1     Sig: Take 60 mg by mouth daily.        Psychiatric Non-Scheduled (Anti-Anxiety) Passed - 8/17/2023  5:55 PM        Passed - In person appointment or virtual visit in the past 6 mos or appointment in next 3 mos     Recent Outpatient Visits              4 weeks ago Essential hypertension    Laquita Angela MD    Telemedicine    2 months ago Essential hypertension    Laquita Angela MD    Telemedicine    3 months ago Lumbar spondylosis    [facility] Jorge Milian MD    Office Visit    3 months ago [facility]    4 [facility]                           Recent Outpatient Visits              4 weeks ago Essential hypertension    Laquita Angela MD    Telemedicine    2 months ago Essential hypertension    Laquita Angela MD    Telemedicine    3 months ago Lumbar spondylosis    [facility] Jorge Milian MD    Office Visit    3 months ago Chronic bilateral low back pain without sciatica    [address] Nomi Haile MD    Office Visit    4 months ago [facility]

## 2023-08-28 ENCOUNTER — TELEPHONE (OUTPATIENT)
Dept: INTERNAL MEDICINE CLINIC | Facility: CLINIC | Age: 88
End: 2023-08-28

## 2023-08-28 NOTE — TELEPHONE ENCOUNTER
Jennifer with [facility],  Ph: [phone]  Fax: [phone]  Is requesting the July appointment notes. . Please fax

## 2023-09-21 ENCOUNTER — TELEPHONE (OUTPATIENT)
Dept: INTERNAL MEDICINE CLINIC | Facility: CLINIC | Age: 88
End: 2023-09-21

## 2023-09-21 NOTE — TELEPHONE ENCOUNTER
Jennifer from [address] [place] [address] calling to follow up on PT evaluation order, asking to fax to [phone].

## 2023-09-22 ENCOUNTER — TELEPHONE (OUTPATIENT)
Dept: INTERNAL MEDICINE CLINIC | Facility: CLINIC | Age: 88
End: 2023-09-22

## 2023-09-22 NOTE — TELEPHONE ENCOUNTER
Spoke to Marisol states she already received the signed order for PT this morning 09/22/23 . This is a duplicate TE. Per Jennifer would like last OV or Tele medicine notes verbal from Dr Linette berry to fax notes from July .

## 2023-10-09 ENCOUNTER — TELEPHONE (OUTPATIENT)
Dept: INTERNAL MEDICINE CLINIC | Facility: CLINIC | Age: 88
End: 2023-10-09

## 2023-10-09 NOTE — TELEPHONE ENCOUNTER
Kamaljit Linda called and stated \" agreed to see Linda tomorrow (10/10/2023) at 1:20pm\". I did not add her to the schedule because I do not see it documented. Can you please verify this and add her to the schedule if this is true and let Kamaljit Linda know.

## 2023-10-10 ENCOUNTER — OFFICE VISIT (OUTPATIENT)
Dept: INTERNAL MEDICINE CLINIC | Facility: CLINIC | Age: 88
End: 2023-10-10

## 2023-10-10 ENCOUNTER — LAB ENCOUNTER (OUTPATIENT)
Dept: LAB | Age: 88
End: 2023-10-10
Attending: INTERNAL MEDICINE
Payer: MEDICARE

## 2023-10-10 VITALS
SYSTOLIC BLOOD PRESSURE: 103 MMHG | HEIGHT: 70 IN | WEIGHT: 171 LBS | DIASTOLIC BLOOD PRESSURE: 64 MMHG | HEART RATE: 1 BPM | BODY MASS INDEX: 24.48 KG/M2

## 2023-10-10 DIAGNOSIS — R53.1 WEAKNESS GENERALIZED: ICD-10-CM

## 2023-10-10 DIAGNOSIS — Z86.711 HISTORY OF PULMONARY EMBOLUS (PE): ICD-10-CM

## 2023-10-10 DIAGNOSIS — Z86.718 HISTORY OF DVT (DEEP VEIN THROMBOSIS): ICD-10-CM

## 2023-10-10 DIAGNOSIS — E78.5 HYPERLIPIDEMIA, UNSPECIFIED HYPERLIPIDEMIA TYPE: ICD-10-CM

## 2023-10-10 DIAGNOSIS — R73.01 ABNORMAL FASTING GLUCOSE: ICD-10-CM

## 2023-10-10 DIAGNOSIS — R26.9 GAIT ABNORMALITY: ICD-10-CM

## 2023-10-10 DIAGNOSIS — M47.816 LUMBAR SPONDYLOSIS: ICD-10-CM

## 2023-10-10 DIAGNOSIS — I10 ESSENTIAL HYPERTENSION: ICD-10-CM

## 2023-10-10 DIAGNOSIS — Z91.81 AT HIGH RISK FOR FALLS: ICD-10-CM

## 2023-10-10 DIAGNOSIS — F32.9 REACTIVE DEPRESSION: ICD-10-CM

## 2023-10-10 DIAGNOSIS — I48.21 PERMANENT ATRIAL FIBRILLATION (HCC): ICD-10-CM

## 2023-10-10 DIAGNOSIS — I10 ESSENTIAL HYPERTENSION: Primary | ICD-10-CM

## 2023-10-10 LAB
ALBUMIN SERPL-MCNC: 3 G/DL (ref 3.4–5)
ALBUMIN/GLOB SERPL: 0.8 {RATIO} (ref 1–2)
ALP LIVER SERPL-CCNC: 93 U/L
ALT SERPL-CCNC: 19 U/L
ANION GAP SERPL CALC-SCNC: 6 MMOL/L (ref 0–18)
AST SERPL-CCNC: 14 U/L (ref 15–37)
BASOPHILS # BLD AUTO: 0.03 X10(3) UL (ref 0–0.2)
BASOPHILS NFR BLD AUTO: 0.4 %
BILIRUB SERPL-MCNC: 0.5 MG/DL (ref 0.1–2)
BILIRUB UR QL: NEGATIVE
BUN BLD-MCNC: 13 MG/DL (ref 7–18)
BUN/CREAT SERPL: 14.1 (ref 10–20)
CALCIUM BLD-MCNC: 8.9 MG/DL (ref 8.5–10.1)
CHLORIDE SERPL-SCNC: 104 MMOL/L (ref 98–112)
CHOLEST SERPL-MCNC: 106 MG/DL (ref ?–200)
CO2 SERPL-SCNC: 28 MMOL/L (ref 21–32)
COLOR UR: YELLOW
CREAT BLD-MCNC: 0.92 MG/DL
DEPRECATED RDW RBC AUTO: 50.7 FL (ref 35.1–46.3)
EGFRCR SERPLBLD CKD-EPI 2021: 60 ML/MIN/1.73M2 (ref 60–?)
EOSINOPHIL # BLD AUTO: 0.16 X10(3) UL (ref 0–0.7)
EOSINOPHIL NFR BLD AUTO: 2 %
ERYTHROCYTE [DISTWIDTH] IN BLOOD BY AUTOMATED COUNT: 14.4 % (ref 11–15)
EST. AVERAGE GLUCOSE BLD GHB EST-MCNC: 120 MG/DL (ref 68–126)
FASTING PATIENT LIPID ANSWER: NO
FASTING STATUS PATIENT QL REPORTED: NO
GLOBULIN PLAS-MCNC: 3.7 G/DL (ref 2.8–4.4)
GLUCOSE BLD-MCNC: 125 MG/DL (ref 70–99)
GLUCOSE UR-MCNC: NORMAL MG/DL
HBA1C MFR BLD: 5.8 % (ref ?–5.7)
HCT VFR BLD AUTO: 38.4 %
HDLC SERPL-MCNC: 50 MG/DL (ref 40–59)
HGB BLD-MCNC: 12.4 G/DL
HGB UR QL STRIP.AUTO: NEGATIVE
HYALINE CASTS #/AREA URNS AUTO: PRESENT /LPF
IMM GRANULOCYTES # BLD AUTO: 0.02 X10(3) UL (ref 0–1)
IMM GRANULOCYTES NFR BLD: 0.3 %
LDLC SERPL CALC-MCNC: 41 MG/DL (ref ?–100)
LEUKOCYTE ESTERASE UR QL STRIP.AUTO: 250
LYMPHOCYTES # BLD AUTO: 1.14 X10(3) UL (ref 1–4)
LYMPHOCYTES NFR BLD AUTO: 14.3 %
MCH RBC QN AUTO: 31.4 PG (ref 26–34)
MCHC RBC AUTO-ENTMCNC: 32.3 G/DL (ref 31–37)
MCV RBC AUTO: 97.2 FL
MONOCYTES # BLD AUTO: 0.98 X10(3) UL (ref 0.1–1)
MONOCYTES NFR BLD AUTO: 12.3 %
NEUTROPHILS # BLD AUTO: 5.64 X10 (3) UL (ref 1.5–7.7)
NEUTROPHILS # BLD AUTO: 5.64 X10(3) UL (ref 1.5–7.7)
NEUTROPHILS NFR BLD AUTO: 70.7 %
NITRITE UR QL STRIP.AUTO: NEGATIVE
NONHDLC SERPL-MCNC: 56 MG/DL (ref ?–130)
OSMOLALITY SERPL CALC.SUM OF ELEC: 288 MOSM/KG (ref 275–295)
PH UR: 5.5 [PH] (ref 5–8)
PLATELET # BLD AUTO: 280 10(3)UL (ref 150–450)
POTASSIUM SERPL-SCNC: 4.1 MMOL/L (ref 3.5–5.1)
PROT SERPL-MCNC: 6.7 G/DL (ref 6.4–8.2)
PROT UR-MCNC: 70 MG/DL
RBC # BLD AUTO: 3.95 X10(6)UL
SODIUM SERPL-SCNC: 138 MMOL/L (ref 136–145)
SP GR UR STRIP: >1.03 (ref 1–1.03)
T4 FREE SERPL-MCNC: 1.2 NG/DL (ref 0.8–1.7)
TRIGL SERPL-MCNC: 73 MG/DL (ref 30–149)
TSI SER-ACNC: 2.75 MIU/ML (ref 0.36–3.74)
UROBILINOGEN UR STRIP-ACNC: 3
VLDLC SERPL CALC-MCNC: 10 MG/DL (ref 0–30)
WBC # BLD AUTO: 8 X10(3) UL (ref 4–11)

## 2023-10-10 PROCEDURE — 36415 COLL VENOUS BLD VENIPUNCTURE: CPT

## 2023-10-10 PROCEDURE — 99214 OFFICE O/P EST MOD 30 MIN: CPT | Performed by: INTERNAL MEDICINE

## 2023-10-10 PROCEDURE — 84439 ASSAY OF FREE THYROXINE: CPT

## 2023-10-10 PROCEDURE — 85025 COMPLETE CBC W/AUTO DIFF WBC: CPT

## 2023-10-10 PROCEDURE — 84443 ASSAY THYROID STIM HORMONE: CPT

## 2023-10-10 PROCEDURE — 1126F AMNT PAIN NOTED NONE PRSNT: CPT | Performed by: INTERNAL MEDICINE

## 2023-10-10 PROCEDURE — 90662 IIV NO PRSV INCREASED AG IM: CPT | Performed by: INTERNAL MEDICINE

## 2023-10-10 PROCEDURE — G0008 ADMIN INFLUENZA VIRUS VAC: HCPCS | Performed by: INTERNAL MEDICINE

## 2023-10-10 PROCEDURE — 80053 COMPREHEN METABOLIC PANEL: CPT

## 2023-10-10 PROCEDURE — 80061 LIPID PANEL: CPT

## 2023-10-10 PROCEDURE — 83036 HEMOGLOBIN GLYCOSYLATED A1C: CPT

## 2023-10-10 PROCEDURE — 81001 URINALYSIS AUTO W/SCOPE: CPT

## 2023-11-21 ENCOUNTER — OFFICE VISIT (OUTPATIENT)
Dept: INTERNAL MEDICINE CLINIC | Facility: CLINIC | Age: 88
End: 2023-11-21

## 2023-11-21 VITALS
BODY MASS INDEX: 24.34 KG/M2 | HEART RATE: 94 BPM | DIASTOLIC BLOOD PRESSURE: 77 MMHG | HEIGHT: 70 IN | WEIGHT: 170 LBS | SYSTOLIC BLOOD PRESSURE: 122 MMHG

## 2023-11-21 DIAGNOSIS — R53.1 WEAKNESS GENERALIZED: ICD-10-CM

## 2023-11-21 DIAGNOSIS — R13.10 DYSPHAGIA, UNSPECIFIED TYPE: ICD-10-CM

## 2023-11-21 DIAGNOSIS — I48.21 PERMANENT ATRIAL FIBRILLATION (HCC): ICD-10-CM

## 2023-11-21 DIAGNOSIS — I10 ESSENTIAL HYPERTENSION: ICD-10-CM

## 2023-11-21 DIAGNOSIS — M47.816 LUMBAR SPONDYLOSIS: ICD-10-CM

## 2023-11-21 DIAGNOSIS — G25.9 MOVEMENT DISORDER: Primary | ICD-10-CM

## 2023-11-21 DIAGNOSIS — R26.9 GAIT ABNORMALITY: ICD-10-CM

## 2023-11-21 PROCEDURE — 1126F AMNT PAIN NOTED NONE PRSNT: CPT | Performed by: INTERNAL MEDICINE

## 2023-11-21 PROCEDURE — 99214 OFFICE O/P EST MOD 30 MIN: CPT | Performed by: INTERNAL MEDICINE

## 2023-11-21 RX ORDER — DOXYCYCLINE 100 MG/1
100 CAPSULE ORAL 2 TIMES DAILY
Qty: 20 CAPSULE | Refills: 0 | Status: SHIPPED | OUTPATIENT
Start: 2023-11-21 | End: 2023-11-21

## 2023-11-21 RX ORDER — ERGOCALCIFEROL 1.25 MG/1
50000 CAPSULE ORAL
Qty: 6 CAPSULE | Refills: 3 | Status: SHIPPED | OUTPATIENT
Start: 2023-11-21

## 2023-11-22 RX ORDER — ROSUVASTATIN CALCIUM 40 MG/1
40 TABLET, COATED ORAL DAILY
Qty: 90 TABLET | Refills: 3 | Status: SHIPPED | OUTPATIENT
Start: 2023-11-22

## 2023-11-22 NOTE — TELEPHONE ENCOUNTER
Refill passed per [place] [facility], [facility] protocol.   Requested Prescriptions   Pending Prescriptions Disp Refills    ROSUVASTATIN 40 MG Oral Tab [Pharmacy Med Name: ROSUVASTATIN 40MG TABLETS] 90 tablet 3     Sig: TAKE 1 TABLET(40 MG) BY MOUTH DAILY       Cholesterol Medication Protocol Passed - 11/21/2023 10:58 AM        Passed - ALT in past 12 months        Passed - LDL in past 12 months        Passed - Last ALT < 80     Lab Results   Component Value Date    ALT 19 10/10/2023             Passed - Last LDL < 130     Lab Results   Component Value Date    LDL 41 10/10/2023             Passed - In person appointment or virtual visit in the past 12 mos or appointment in next 3 mos     Recent Outpatient Visits              Yesterday Movement disorder    Michel Blanton MD    Office Visit    1 month ago Essential hypertension    Michel Blanton MD    Office Visit    4 months ago Essential hypertension    Michel Blanton MD    Telemedicine    5 months ago Essential hypertension    Michel Blanton MD    Telemedicine    6 months ago Lumbar spondylosis    [address] Jarrell Schaumann, MD    Office Visit                         Recent Outpatient Visits              Yesterday Movement disorder    Michel Blanton MD    Office Visit    1 month ago Essential hypertension    Michel Blanton MD    Office Visit    4 months ago Essential hypertension    Michel Blanton MD    Telemedicine    5 months ago Essential hypertension    Michel Blanton MD Telemedicine    6 months ago Lumbar spondylosis    [address] Rajni Messer MD    Office Visit

## 2023-12-04 PROBLEM — Z86.718 HISTORY OF DVT OF LOWER EXTREMITY: Status: ACTIVE | Noted: 2023-12-04

## 2023-12-04 PROBLEM — Z86.711 HISTORY OF PULMONARY EMBOLISM: Status: ACTIVE | Noted: 2023-12-04

## 2023-12-08 ENCOUNTER — MED REC SCAN ONLY (OUTPATIENT)
Dept: INTERNAL MEDICINE CLINIC | Facility: CLINIC | Age: 88
End: 2023-12-08

## 2023-12-11 ENCOUNTER — TELEPHONE (OUTPATIENT)
Dept: INTERNAL MEDICINE CLINIC | Facility: CLINIC | Age: 88
End: 2023-12-11

## 2023-12-11 ENCOUNTER — PATIENT MESSAGE (OUTPATIENT)
Dept: INTERNAL MEDICINE CLINIC | Facility: CLINIC | Age: 88
End: 2023-12-11

## 2023-12-11 NOTE — TELEPHONE ENCOUNTER
Kaleb, [facility] calling to request home health orders for physical therapy. Please fax to [phone], [facility], thanks.

## 2023-12-12 NOTE — TELEPHONE ENCOUNTER
From: Ariella Roland  To: Clemente Cedeno  Sent: 12/11/2023 3:35 PM CST  Subject: Madi Selby,    I would like to request a new order for home health service to [facility] .    Their fax number is   Phone number : [phone]  : Evelyn De Paz    Thank you,  Linda/ Renetta Macdonald

## 2023-12-26 ENCOUNTER — TELEPHONE (OUTPATIENT)
Dept: INTERNAL MEDICINE CLINIC | Facility: CLINIC | Age: 88
End: 2023-12-26

## 2024-01-01 ENCOUNTER — OFFICE VISIT (OUTPATIENT)
Dept: NEUROLOGY | Facility: CLINIC | Age: 89
End: 2024-01-01

## 2024-01-01 VITALS — OXYGEN SATURATION: 98 % | DIASTOLIC BLOOD PRESSURE: 84 MMHG | SYSTOLIC BLOOD PRESSURE: 124 MMHG | HEART RATE: 78 BPM

## 2024-01-01 DIAGNOSIS — F03.C3 SEVERE DEMENTIA WITH MOOD DISTURBANCE, UNSPECIFIED DEMENTIA TYPE (HCC): Primary | ICD-10-CM

## 2024-01-01 RX ORDER — DOXEPIN 6 MG/1
6 TABLET, FILM COATED ORAL EVERY EVENING
Qty: 30 TABLET | Refills: 1 | Status: SHIPPED | OUTPATIENT
Start: 2024-01-01 | End: 2024-01-01

## 2024-01-02 ENCOUNTER — HOSPITAL ENCOUNTER (OUTPATIENT)
Dept: MRI IMAGING | Age: 89
Discharge: HOME | End: 2024-01-02
Attending: INTERNAL MEDICINE
Payer: MEDICARE

## 2024-01-02 DIAGNOSIS — R26.9 GAIT ABNORMALITY: ICD-10-CM

## 2024-01-02 PROCEDURE — 70551 MRI BRAIN STEM W/O DYE: CPT | Performed by: INTERNAL MEDICINE

## 2024-01-03 ENCOUNTER — HOSPITAL ENCOUNTER (OUTPATIENT)
Dept: GENERAL RADIOLOGY | Facility: HOSPITAL | Age: 89
Discharge: HOME | End: 2024-01-03
Attending: INTERNAL MEDICINE
Payer: MEDICARE

## 2024-01-03 DIAGNOSIS — R13.10 DYSPHAGIA, UNSPECIFIED TYPE: ICD-10-CM

## 2024-01-03 PROCEDURE — 92611 MOTION FLUOROSCOPY/SWALLOW: CPT

## 2024-01-03 PROCEDURE — 74230 X-RAY XM SWLNG FUNCJ C+: CPT | Performed by: INTERNAL MEDICINE

## 2024-01-03 NOTE — PATIENT INSTRUCTIONS
VIDEO SWALLOW STUDY    Diet Recommendations:  Solids: Regular  Liquids: Thin    Recommended compensatory strategies:   Sit upright  Small sips    Medication Administration:  Take whole in pureed    Paty Mason MA/CCC-SLP  Speech Language Pathologist  [facility]  [phone]

## 2024-01-03 NOTE — PROGRESS NOTES
ADULT VIDEOFLUOROSCOPIC SWALLOWING STUDY       ADULT VIDEOFLUOROSCOPIC SWALLOWING STUDY:   Referring Physician: Bishnu      Radiologist: Dr. Garner  Diagnosis: dysphagia    Date of Service: 1/3/2024     PATIENT SUMMARY   Chief Complaint: Pt and caregiver report patient coughs daily with food, liquid and pills.  She denies shortness of breath, odynophagia and weight loss. Pt was accompanied by two caregivers.    Current Diet: regular foods an liquids.       Problem List  Active Problems:  Active Problems:    * No active hospital problems. 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   Food/Liquid Types Presented: puree, solid, nectar thick liquid, thin liquids, and barium tablet.    Study Position and View:  Patient was seated upright and viewed laterally.  A-P was not viewed due to mobility issues and equipment limitations.    Pain Assessment: The patient reports pain at a level of 0/10.    Oral phase:  Bilabial seal was adequate with no anterior food or liquid loss.  Disorganized lingual movements  resulted in mildly increased oral prep and transit times.  Pt made several attempts to propel a barium tablet into the pharynx before successful propulsion.  Trace to mild oral residue remained intermittently with all consistencies, but was cleared with spontaneous second swallow.      Pharyngeal phase:  The pharyngeal response triggered at the tongue base for liquids and solids and at the valleculae for puree with intermittent one second delay.  Adequate base of tongue retraction, hyolaryngeal excursion and epiglottic inversion.  No laryngeal penetration or aspiration was observed with any consistency.  Trace pharyngeal retention remained in the valleculae with puree and solids and throughout the pharynx with liquids.      Esophageal phase:   Adequate flow of bolus through upper esophagus     Penetration Aspiration Scale: 1/8.  Material does not enter the airway.    Overall Impression: Normal swallow.  Recommend general diet with thin liquids.  Of note, patient tended to take large liquid boluses, especially when taking pills.  Recommend small sips and taking pills with puree to reduce risk of aspiration.       FCM category and level: Swallowing, 7  PLAN   Potential: Excellent    Diet Recommendations:  Solids: Regular  Liquids: Thin    Recommended compensatory strategies:   Sit upright  Small sips    Medication Administration:  Take whole in pureed    Further Follow-up:  No follow up warranted with this service.         EDUCATION/INSTRUCTION  Reviewed results and recommendations with patient and caregivers.  Written instructions were provided.  Agreement/Understanding verbalized and all questions answered to their apparent satisfaction.      INTERDISCIPLINARY COMMUNICATION  Reviewed results with Radiologist; agreement verbalized.      Thank you for your referral. Please co-sign or sign and return this letter via fax as soon as possible. If you have any questions, please contact me at [phone].    Paty  Marlon SOLOMON/MAURIZIO-SLP  Speech Language Pathologist  [facility]  [phone]    Electronically signed by therapist: Paty Mason, SLP  Physician's certification required: No

## 2024-02-02 RX ORDER — LEVOTHYROXINE SODIUM 0.05 MG/1
50 TABLET ORAL
Qty: 90 TABLET | Refills: 3 | Status: SHIPPED | OUTPATIENT
Start: 2024-02-02

## 2024-02-02 NOTE — TELEPHONE ENCOUNTER
Refill passed per [facility] protocol.  Requested Prescriptions   Pending Prescriptions Disp Refills    LEVOTHYROXINE 50 MCG Oral Tab [Pharmacy Med Name: LEVOTHYROXINE 0.05MG (50MCG) TAB] 90 tablet 1     Sig: TAKE 1 TABLET(50 MCG) BY MOUTH BEFORE BREAKFAST       Thyroid Medication Protocol Passed - 2/1/2024  5:38 PM        Passed - TSH in past 12 months        Passed - Last TSH value is normal     Lab Results   Component Value Date    TSH 2.750 10/10/2023                 Passed - In person appointment or virtual visit in the past 12 mos or appointment in next 3 mos     Recent Outpatient Visits              2 months ago Movement disorder    [facility]    [facility]    3 months ago [facility]    [facility]    6 months ago [facility]    [facility]    7 months ago [facility]ential [facility]    8 months ago Lumbar spondylosis    [facility] Dipak Snow MD    Office Visit                         Recent Outpatient Visits              2 months ago [facility]    [facility]    3 months ago [facility]ential [facility]    [facility]    6 months ago [facility]ential [facility]    7 months ago [facility]ential [facility]    8 months ago Lumbar spondylosis    [facility] Dipak Snow MD    Office Visit

## 2024-02-20 ENCOUNTER — NURSE TRIAGE (OUTPATIENT)
Dept: INTERNAL MEDICINE CLINIC | Facility: CLINIC | Age: 89
End: 2024-02-20

## 2024-02-20 NOTE — TELEPHONE ENCOUNTER
Action Requested: Summary for Provider     []  Critical Lab, Recommendations Needed  [] Need Additional Advice  []   FYI    []   Need Orders  [] Need Medications Sent to Pharmacy  [x]  Other: appt request     SUMMARY: Per protocol disposition advised office visit within 3 days       Reason for call: Neck Pain  Onset: chronic     Caregiver Ceci called in (on verbal release). Patient reports moderate chronic neck pain. Rating 6-7/10. Denies any numbness/tingling or weakness. Denies other symptoms marked no under protocol.     Reason for Disposition   MODERATE neck pain (e.g., interferes with normal activities like work or school)    Protocols used: Neck Pain or [id number]    Dr. Goetz: patient declines to see another provider. She also needs note for dentist that it is okay to proceed cleanings. They are asking if you would be able to add on tomorrow for neck pain and clearance?

## 2024-02-20 NOTE — TELEPHONE ENCOUNTER
Spoke with caregiver Ceci, on ELVI.  She agreed to bring patint to [place] at 1:30 pm tomorrow.    Future Appointments   Date Time Provider Department Center   2/21/2024  2:00 PM Natalie Goetz MD [facility]

## 2024-02-21 ENCOUNTER — TELEPHONE (OUTPATIENT)
Dept: INTERNAL MEDICINE CLINIC | Facility: CLINIC | Age: 89
End: 2024-02-21

## 2024-02-21 ENCOUNTER — LAB ENCOUNTER (OUTPATIENT)
Dept: LAB | Age: 89
End: 2024-02-21
Attending: INTERNAL MEDICINE
Payer: MEDICARE

## 2024-02-21 ENCOUNTER — OFFICE VISIT (OUTPATIENT)
Dept: INTERNAL MEDICINE CLINIC | Facility: CLINIC | Age: 89
End: 2024-02-21

## 2024-02-21 VITALS
SYSTOLIC BLOOD PRESSURE: 117 MMHG | HEART RATE: 83 BPM | HEIGHT: 70 IN | BODY MASS INDEX: 26.63 KG/M2 | WEIGHT: 186 LBS | DIASTOLIC BLOOD PRESSURE: 73 MMHG

## 2024-02-21 DIAGNOSIS — R26.9 GAIT ABNORMALITY: ICD-10-CM

## 2024-02-21 DIAGNOSIS — R73.01 ABNORMAL FASTING GLUCOSE: ICD-10-CM

## 2024-02-21 DIAGNOSIS — M47.812 CERVICAL SPONDYLOSIS: ICD-10-CM

## 2024-02-21 DIAGNOSIS — Z86.718 HISTORY OF DVT (DEEP VEIN THROMBOSIS): ICD-10-CM

## 2024-02-21 DIAGNOSIS — E78.5 HYPERLIPIDEMIA, UNSPECIFIED HYPERLIPIDEMIA TYPE: ICD-10-CM

## 2024-02-21 DIAGNOSIS — Z01.818 PREOP EXAM FOR INTERNAL MEDICINE: Primary | ICD-10-CM

## 2024-02-21 DIAGNOSIS — I10 ESSENTIAL HYPERTENSION: ICD-10-CM

## 2024-02-21 DIAGNOSIS — Z79.01 CURRENT USE OF LONG TERM ANTICOAGULATION: ICD-10-CM

## 2024-02-21 DIAGNOSIS — M47.816 LUMBAR SPONDYLOSIS: ICD-10-CM

## 2024-02-21 DIAGNOSIS — I48.21 PERMANENT ATRIAL FIBRILLATION (HCC): ICD-10-CM

## 2024-02-21 DIAGNOSIS — M47.812 OSTEOARTHRITIS OF CERVICAL SPINE, UNSPECIFIED SPINAL OSTEOARTHRITIS COMPLICATION STATUS: ICD-10-CM

## 2024-02-21 DIAGNOSIS — Z86.711 HISTORY OF PULMONARY EMBOLUS (PE): ICD-10-CM

## 2024-02-21 DIAGNOSIS — Z91.81 AT HIGH RISK FOR FALLS: ICD-10-CM

## 2024-02-21 LAB
ALBUMIN SERPL-MCNC: 3.8 G/DL (ref 3.2–4.8)
ALBUMIN/GLOB SERPL: 1.4 {RATIO} (ref 1–2)
ALP LIVER SERPL-CCNC: 104 U/L
ALT SERPL-CCNC: 18 U/L
ANION GAP SERPL CALC-SCNC: 4 MMOL/L (ref 0–18)
AST SERPL-CCNC: 22 U/L (ref ?–34)
BASOPHILS # BLD AUTO: 0.04 X10(3) UL (ref 0–0.2)
BASOPHILS NFR BLD AUTO: 0.6 %
BILIRUB SERPL-MCNC: 0.6 MG/DL (ref 0.2–1.1)
BILIRUB UR QL: NEGATIVE
BUN BLD-MCNC: 16 MG/DL (ref 9–23)
BUN/CREAT SERPL: 15.7 (ref 10–20)
CALCIUM BLD-MCNC: 9.4 MG/DL (ref 8.7–10.4)
CHLORIDE SERPL-SCNC: 107 MMOL/L (ref 98–112)
CHOLEST SERPL-MCNC: 125 MG/DL (ref ?–200)
CLARITY UR: CLEAR
CO2 SERPL-SCNC: 30 MMOL/L (ref 21–32)
CREAT BLD-MCNC: 1.02 MG/DL
DEPRECATED RDW RBC AUTO: 52.9 FL (ref 35.1–46.3)
EGFRCR SERPLBLD CKD-EPI 2021: 53 ML/MIN/1.73M2 (ref 60–?)
EOSINOPHIL # BLD AUTO: 0.21 X10(3) UL (ref 0–0.7)
EOSINOPHIL NFR BLD AUTO: 3.3 %
ERYTHROCYTE [DISTWIDTH] IN BLOOD BY AUTOMATED COUNT: 15.2 % (ref 11–15)
EST. AVERAGE GLUCOSE BLD GHB EST-MCNC: 123 MG/DL (ref 68–126)
FASTING PATIENT LIPID ANSWER: NO
FASTING STATUS PATIENT QL REPORTED: NO
GLOBULIN PLAS-MCNC: 2.8 G/DL (ref 2.8–4.4)
GLUCOSE BLD-MCNC: 79 MG/DL (ref 70–99)
GLUCOSE UR-MCNC: NORMAL MG/DL
HBA1C MFR BLD: 5.9 % (ref ?–5.7)
HCT VFR BLD AUTO: 40.8 %
HDLC SERPL-MCNC: 53 MG/DL (ref 40–59)
HGB BLD-MCNC: 13.1 G/DL
HGB UR QL STRIP.AUTO: NEGATIVE
IMM GRANULOCYTES # BLD AUTO: 0.02 X10(3) UL (ref 0–1)
IMM GRANULOCYTES NFR BLD: 0.3 %
KETONES UR-MCNC: NEGATIVE MG/DL
LDLC SERPL CALC-MCNC: 57 MG/DL (ref ?–100)
LEUKOCYTE ESTERASE UR QL STRIP.AUTO: 25
LYMPHOCYTES # BLD AUTO: 1.37 X10(3) UL (ref 1–4)
LYMPHOCYTES NFR BLD AUTO: 21.8 %
MCH RBC QN AUTO: 30.7 PG (ref 26–34)
MCHC RBC AUTO-ENTMCNC: 32.1 G/DL (ref 31–37)
MCV RBC AUTO: 95.6 FL
MONOCYTES # BLD AUTO: 0.67 X10(3) UL (ref 0.1–1)
MONOCYTES NFR BLD AUTO: 10.7 %
NEUTROPHILS # BLD AUTO: 3.98 X10 (3) UL (ref 1.5–7.7)
NEUTROPHILS # BLD AUTO: 3.98 X10(3) UL (ref 1.5–7.7)
NEUTROPHILS NFR BLD AUTO: 63.3 %
NITRITE UR QL STRIP.AUTO: NEGATIVE
NONHDLC SERPL-MCNC: 72 MG/DL (ref ?–130)
OSMOLALITY SERPL CALC.SUM OF ELEC: 292 MOSM/KG (ref 275–295)
PH UR: 5.5 [PH] (ref 5–8)
PLATELET # BLD AUTO: 308 10(3)UL (ref 150–450)
POTASSIUM SERPL-SCNC: 4.5 MMOL/L (ref 3.5–5.1)
PROT SERPL-MCNC: 6.6 G/DL (ref 5.7–8.2)
PROT UR-MCNC: NEGATIVE MG/DL
RBC # BLD AUTO: 4.27 X10(6)UL
SODIUM SERPL-SCNC: 141 MMOL/L (ref 136–145)
SP GR UR STRIP: 1.01 (ref 1–1.03)
T4 FREE SERPL-MCNC: 1.2 NG/DL (ref 0.8–1.7)
TRIGL SERPL-MCNC: 77 MG/DL (ref 30–149)
TSI SER-ACNC: 4.58 MIU/ML (ref 0.55–4.78)
UROBILINOGEN UR STRIP-ACNC: NORMAL
VLDLC SERPL CALC-MCNC: 11 MG/DL (ref 0–30)
WBC # BLD AUTO: 6.3 X10(3) UL (ref 4–11)

## 2024-02-21 PROCEDURE — 81001 URINALYSIS AUTO W/SCOPE: CPT

## 2024-02-21 PROCEDURE — 83036 HEMOGLOBIN GLYCOSYLATED A1C: CPT

## 2024-02-21 PROCEDURE — 84439 ASSAY OF FREE THYROXINE: CPT

## 2024-02-21 PROCEDURE — 80061 LIPID PANEL: CPT

## 2024-02-21 PROCEDURE — 99214 OFFICE O/P EST MOD 30 MIN: CPT | Performed by: INTERNAL MEDICINE

## 2024-02-21 PROCEDURE — 85025 COMPLETE CBC W/AUTO DIFF WBC: CPT

## 2024-02-21 PROCEDURE — 84443 ASSAY THYROID STIM HORMONE: CPT

## 2024-02-21 PROCEDURE — 80053 COMPREHEN METABOLIC PANEL: CPT

## 2024-02-21 PROCEDURE — 36415 COLL VENOUS BLD VENIPUNCTURE: CPT

## 2024-02-21 NOTE — PROGRESS NOTES
HPI:    Patient ID: Linda Cleveland is a 89 year old female.    HPI    Dental clearance dental work at [facility] and cosmetic dentistry phone: [phone] fax : [phone]     Linda Cleveland presents for dental procedure  She is on anticoagulation   For chronic atrial fibrillation  Hx of DVT and PE  dx 5/22/23    Acute neck pain  - Arthritis: yes  - Neck pain: yes  - Difficulty with ROM: yes  - Prior neck injury/surgery: no    Cardiac:  - History of ischemic coronary disease: no  - History of heart failure: no  - Heart attack in past 90 days: no  - Chest pain in last 90 days: no  - History of Arrhythmia:  yes  chronic atrial fibrilaltion  - History of stroke or TIA:yes  - Diabetes/A1C: Last A1c value was 5.8% done 10/10/2023.      MRI brain 1/2/24  mpression   CONCLUSION:  1. No acute intracranial abnormality. Specifically, no evidence of acute or early subacute infarction.  2. Background mild-to-moderate presumed sequelae of chronic microangiopathy throughout both cerebral hemispheres.  3. Probable T2 shine through from a late subacute to chronic left cerebellar lacunar infarct.  4. Image degradation secondary to patient related motion artifact.        elm-remote     Dictated by (CST): Ryley Pugh MD on 1/03/2024 at 7:15 AM         - Most recent echo/Stress test:   5/19/23  Conclusions:     1. Left ventricle: The cavity size was normal. Wall thickness was mildly      increased. Systolic function was normal. The estimated ejection fraction      was 55-60%, by biplane method of disks.   2. Right ventricle: The cavity size was mildly to moderately increased.      Systolic function was reduced.   3. Left atrium: The atrium was markedly dilated.   4. Right atrium: The atrium was markedly dilated.   5. Mitral valve: There was mild regurgitation.   6. Tricuspid valve: There was moderate regurgitation.   Impressions:  No previous study was available for comparison.   -    Pulmonary:   - Smoker: no  -  Apnea/CPAP:no  - Asthma/COPD:no  - Difficulty laying flat for extended period of time: no  - Dyspnea/exertional: sedentary  -  Functional Capacity:   Perform ADLs- eating, dress, toilet (1 METs)?yes  Walk up flight of steps, hill, walk ground level 3-4mph (4 METs)? no    Risk Assessment Tools:  body mass index is 26.69 kg/m².     Wt Readings from Last 6 Encounters:   02/21/24 186 lb (84.4 kg)   11/21/23 170 lb (77.1 kg)   10/10/23 171 lb (77.6 kg)   05/18/23 160 lb 15 oz (73 kg)   05/03/23 160 lb (72.6 kg)   04/28/23 160 lb (72.6 kg)     Body mass index is 26.69 kg/m².  HGBA1C:    Lab Results   Component Value Date    A1C 5.8 (H) 10/10/2023    A1C 5.3 12/06/2022    A1C 5.6 07/13/2022     10/10/2023         Review of Systems   Constitutional:  Negative for activity change, chills, fatigue and fever.   HENT:  Negative for ear discharge, nosebleeds, postnasal drip, rhinorrhea, sinus pressure and sore throat.    Eyes:  Negative for pain, discharge and redness.   Respiratory:  Negative for cough, chest tightness, shortness of breath and wheezing.    Cardiovascular:  Negative for chest pain, palpitations and leg swelling.   Gastrointestinal:  Negative for abdominal pain, blood in stool, constipation, diarrhea, nausea and vomiting.   Genitourinary:  Negative for difficulty urinating, dysuria, frequency, hematuria and urgency.   Musculoskeletal:  Positive for arthralgias, back pain, gait problem, neck pain and neck stiffness. Negative for joint swelling.   Skin:  Negative for rash.   Neurological:  Negative for syncope, weakness, light-headedness and headaches.   Psychiatric/Behavioral:  Negative for dysphoric mood. The patient is not nervous/anxious.          Current Outpatient Medications   Medication Sig Dispense Refill    levothyroxine 50 MCG Oral Tab Take 1 tablet (50 mcg total) by mouth before breakfast. 90 tablet 3    rosuvastatin 40 MG Oral Tab Take 1 tablet (40 mg total) by mouth daily. 90 tablet 3     ergocalciferol 1.25 MG (34099 UT) Oral Cap Take 1 capsule (50,000 Units total) by mouth every 14 (fourteen) days. 6 capsule 3    rivaroxaban 20 MG Oral Tab Take 1 tablet (20 mg total) by mouth nightly. 90 tablet 1    DULoxetine HCl 60 MG Oral Capsule Delayed Release Sprinkle Take 60 mg by mouth daily. 90 capsule 3    losartan 25 MG Oral Tab 1 tablet (25 mg total).      dilTIAZem HCl ER Coated Beads 180 MG Oral Capsule SR 24 Hr Take 1 capsule (180 mg total) by mouth daily. 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 Hall- mono-vision OD for near    CATARACT EXTRACTION W/  INTRAOCULAR LENS IMPLANT Left 03/02/2005    Dr. Hall- mono-vision OS for distance    YAG CAPSULOTOMY - OU - BOTH EYES  2007, 2008    OS 12/2/08 - JIMMIE, OD 3/1/07- Dr. Hall      Family History   Problem Relation Age of Onset    Other (Varicose veins) Mother     Other (Varicose veins) Father     Diabetes Neg     Glaucoma Neg     Macular degeneration Neg       Social History:   Social History     Socioeconomic History    Marital status:    Tobacco Use    Smoking status: Never     Passive exposure: Never    Smokeless tobacco: Never   Vaping Use    Vaping Use: Never used   Substance and Sexual  Activity    Alcohol use: No     Alcohol/week: 0.0 standard drinks of alcohol    Drug use: No   Other Topics Concern    Caffeine Concern Yes     Comment: coffee, ocassionally        PHYSICAL EXAM:    Physical Exam  Constitutional:       Appearance: She is not ill-appearing.   HENT:      Right Ear: Ear canal normal.      Left Ear: Ear canal normal.      Nose: No congestion.   Eyes:      Conjunctiva/sclera: Conjunctivae normal.   Cardiovascular:      Rate and Rhythm: Rhythm irregular.      Heart sounds: No murmur heard.  Pulmonary:      Effort: Pulmonary effort is normal. No respiratory distress.      Breath sounds: Normal breath sounds.   Abdominal:      General: Bowel sounds are normal.      Palpations: Abdomen is soft.      Tenderness: There is no abdominal tenderness. There is no guarding.   Musculoskeletal:      Right lower leg: No edema.      Left lower leg: No edema.   Skin:     General: Skin is warm and dry.   Neurological:      Mental Status: She is alert.      Gait: Gait abnormal.   Psychiatric:         Mood and Affect: Mood normal.         Behavior: Behavior normal.              ASSESSMENT/PLAN:     (Z01.818) Preop exam for internal medicine  (primary encounter diagnosis  Plan  Clearance for dental procedue  Hx of DVT and PE  5/2024  Chronic atrial fibrialltion  Medically stable to proceed with dental procedure   Under local anesthesia  avoid epinephrine  May hold anticoagulation  xarelto    (I10) Essential hypertension  Plan: Urinalysis, Routine       /73 (BP Location: Left arm, Patient Position: Sitting, Cuff Size: adult)   Pulse 83   Ht 5' 10\" (1.778 m)   Wt 186 lb (84.4 kg)   BMI 26.69 kg/m²   controlled    (I48.21) Permanent atrial fibrillation (HCC)  Plan: chronic stable    (E78.5) Hyperlipidemia, unspecified hyperlipidemia type  Plan: CBC With Differential With Platelet, Comp         Metabolic Panel (14), Lipid Panel, TSH and Free        T4        Low cholesterol diet advised  Avoid  saturated and trans fats       (R73.01) Abnormal fasting glucose  Plan: Hemoglobin A1C        Limit carb intake  dsicussed diet    (M47.812) Osteoarthritis of cervical spine, unspecified spinal osteoarthritis complication status  Plan: chronic  decrease ROM  Heat massage proper post ure  prn tylenol  monitor    (R26.9) Gait abnormality  Plan: fall precuaiton  Pt is now walking with stand by assits     (Z86.711) History of pulmonary embolus (PE)  Plan: no known recurrence    (Z86.718) History of DVT (deep vein thrombosis)  Plan: no known recurrence      (Z91.81) At high risk for falls  Plan: fall precaution  She is with 224 hour caregiver    (M47.812) Cervical spondylosis  Plan: fall precaution    (M47.816) Lumbar spondylosis  Plan: fall precaution    (Z79.01) Current use of long term anticoagulation  Plan:hold for dental procedure for 3 days and resume promptly    Patient voiced understanding  and agrees with plan          Meds This Visit:  Requested Prescriptions      No prescriptions requested or ordered in this encounter       Imaging & Referrals:  None        ID#[id number]

## 2024-04-08 ENCOUNTER — TELEPHONE (OUTPATIENT)
Dept: INTERNAL MEDICINE CLINIC | Facility: CLINIC | Age: 89
End: 2024-04-08

## 2024-04-08 NOTE — TELEPHONE ENCOUNTER
[facility] confirm how patient is to hold her anticoagulation medication. They received a form stating that patient should hold anticoagulation medication for 3 days, is this for all services or a particular service? Please advise

## 2024-04-19 NOTE — TELEPHONE ENCOUNTER
Please review. Protocol Failed; No Protocol    Requested Prescriptions   Pending Prescriptions Disp Refills    XARELTO 20 MG Oral Tab [Pharmacy Med Name: XARELTO 20MG TABLETS] 90 tablet 1     Sig: TAKE 1 TABLET(20 MG) BY MOUTH EVERY NIGHT       There is no refill protocol information for this order              Recent Outpatient Visits              1 month ago Preop exam for internal medicine    [facility], Natalie Oates MD    Office Visit    5 months ago Movement disorder    [facility], Natalie Cabello MD    Office Visit    6 months ago Essential hypertension    [facility]    [facility]    9 months ago Essential hypertension    [facility], Natalie Cabello MD    Telemedicine    10 months ago Essential hypertension    [facility]

## 2024-05-01 ENCOUNTER — OFFICE VISIT (OUTPATIENT)
Dept: INTERNAL MEDICINE CLINIC | Facility: CLINIC | Age: 89
End: 2024-05-01

## 2024-05-01 VITALS
DIASTOLIC BLOOD PRESSURE: 80 MMHG | BODY MASS INDEX: 26.63 KG/M2 | HEIGHT: 70 IN | HEART RATE: 92 BPM | WEIGHT: 186 LBS | SYSTOLIC BLOOD PRESSURE: 118 MMHG

## 2024-05-01 DIAGNOSIS — I10 ESSENTIAL HYPERTENSION: ICD-10-CM

## 2024-05-01 DIAGNOSIS — Z91.81 AT HIGH RISK FOR FALLS: ICD-10-CM

## 2024-05-01 DIAGNOSIS — R73.01 ABNORMAL FASTING GLUCOSE: ICD-10-CM

## 2024-05-01 DIAGNOSIS — Z86.711 HISTORY OF PULMONARY EMBOLUS (PE): ICD-10-CM

## 2024-05-01 DIAGNOSIS — Z86.718 HISTORY OF DVT (DEEP VEIN THROMBOSIS): ICD-10-CM

## 2024-05-01 DIAGNOSIS — R26.9 GAIT ABNORMALITY: ICD-10-CM

## 2024-05-01 DIAGNOSIS — M47.816 LUMBAR SPONDYLOSIS: ICD-10-CM

## 2024-05-01 DIAGNOSIS — Z79.01 CURRENT USE OF LONG TERM ANTICOAGULATION: ICD-10-CM

## 2024-05-01 DIAGNOSIS — H35.30 ARMD (AGE-RELATED MACULAR DEGENERATION), BILATERAL: ICD-10-CM

## 2024-05-01 DIAGNOSIS — R41.0 NOCTURNAL CONFUSION: Primary | ICD-10-CM

## 2024-05-01 DIAGNOSIS — I48.21 PERMANENT ATRIAL FIBRILLATION (HCC): ICD-10-CM

## 2024-05-01 DIAGNOSIS — R53.1 WEAKNESS GENERALIZED: ICD-10-CM

## 2024-05-01 PROCEDURE — 99215 OFFICE O/P EST HI 40 MIN: CPT | Performed by: INTERNAL MEDICINE

## 2024-05-01 RX ORDER — VIT A/VIT C/VIT E/ZINC/COPPER 7160-113
TABLET, DELAYED RELEASE (ENTERIC COATED) ORAL
COMMUNITY

## 2024-05-01 RX ORDER — OLANZAPINE 2.5 MG/1
2.5 TABLET, FILM COATED ORAL NIGHTLY
Qty: 30 TABLET | Refills: 0 | Status: SHIPPED | OUTPATIENT
Start: 2024-05-01

## 2024-05-07 ENCOUNTER — OFFICE VISIT (OUTPATIENT)
Dept: PODIATRY CLINIC | Facility: CLINIC | Age: 89
End: 2024-05-07

## 2024-05-07 DIAGNOSIS — R26.81 GAIT INSTABILITY: ICD-10-CM

## 2024-05-07 DIAGNOSIS — B35.1 ONYCHOMYCOSIS: Primary | ICD-10-CM

## 2024-05-07 PROCEDURE — 99203 OFFICE O/P NEW LOW 30 MIN: CPT | Performed by: PODIATRIST

## 2024-05-07 RX ORDER — KETOCONAZOLE 20 MG/G
CREAM TOPICAL
Qty: 30 G | Refills: 2 | Status: SHIPPED | OUTPATIENT
Start: 2024-05-07

## 2024-05-07 NOTE — PROGRESS NOTES
[facility]  Progress Note    Linda Cleveland is a 89 year old female.   Chief Complaint   Patient presents with    Toenail Fungus     Left big toe - no pain - has very thick toenail - here with her caregivers          HPI:     This is a pleasant female with afib, lumbar spondylosis, history of DVT and PE.  She is on xarelto.      She presents to clinic with her caregiver.    She does use a walker for ambulation.      She presents to clinic today due to left hallux toenail being very thick and discolored.  She denies any pain.         Allergies: Patient has no known allergies.   Current Outpatient Medications   Medication Sig Dispense Refill    ketoconazole 2 % External Cream Apply thin layer to left great toenail daily 30 g 2    Multiple Vitamins-Minerals (ICAPS AREDS FORMULA) Oral Tab Take by mouth.      OLANZapine (ZYPREXA) 2.5 MG Oral Tab Take 1 tablet (2.5 mg total) by mouth nightly. 30 tablet 0    rivaroxaban (XARELTO) 20 MG Oral Tab Take 1 tablet (20 mg total) by mouth nightly. 90 tablet 1    zolpidem (AMBIEN) 5 MG Oral Tab Take 1 tablet (5 mg total) by mouth nightly as needed for Sleep. 30 tablet 0    levothyroxine 50 MCG Oral Tab Take 1 tablet (50 mcg total) by mouth before breakfast. 90 tablet 3    rosuvastatin 40 MG Oral Tab Take 1 tablet (40 mg total) by mouth daily. 90 tablet 3    ergocalciferol 1.25 MG (94202 UT) Oral Cap Take 1 capsule (50,000 Units total) by mouth every 14 (fourteen) days. 6 capsule 3    DULoxetine HCl 60 MG Oral Capsule Delayed Release Sprinkle Take 60 mg by mouth daily. 90 capsule 3    losartan 25 MG Oral Tab 1 tablet (25 mg total).      dilTIAZem HCl ER Coated Beads 180 MG Oral Capsule SR 24 Hr Take 1 capsule (180 mg total) by mouth daily. 30 capsule 3      Past Medical History:    Blepharitis    Capsular opacification    YAG laser    Cataract    Cup to disc asymmetry    Increased C/D ratio    Dry eyes, bilateral    Essential hypertension    Glaucoma suspect of both  eyes    Hyperlipidemia    Map-dot-fingerprint corneal dystrophy    Meibomian gland dysfunction    Permanent atrial fibrillation (HCC)    Preglaucoma    Pseudophakia of both eyes    Tearing eyes    Uncomplicated varicose veins    Vitamin D deficiency    Vitreous floater      Past Surgical History:   Procedure Laterality Date    Cataract extraction w/  intraocular lens implant Right 02/09/2005    Dr. Hall- mono-vision OD for near    Cataract extraction w/  intraocular lens implant Left 03/02/2005    Dr. Hall- mono-vision OS for distance    Yag capsulotomy - ou - both eyes  2007, 2008    OS 12/2/08 - JIMMIE, OD 3/1/07- Dr. Hall      Family History   Problem Relation Age of Onset    Other (Varicose veins) Mother     Other (Varicose veins) Father     Diabetes Neg     Glaucoma Neg     Macular degeneration Neg       Social History     Socioeconomic History    Marital status:    Tobacco Use    Smoking status: Never     Passive exposure: Never    Smokeless tobacco: Never   Vaping Use    Vaping status: Never Used   Substance and Sexual Activity    Alcohol use: No     Alcohol/week: 0.0 standard drinks of alcohol    Drug use: No   Other Topics Concern    Caffeine Concern Yes     Comment: coffee, ocassionally           REVIEW OF SYSTEMS:   Denies nausea, fever, chills  No calf pain  No other muscle or joint aches  Denies chest pain or shortness of breath.      EXAM:   There were no vitals taken for this visit.    Constitutional:   Patient in no apparent distress. Well kept. Of normal body habitus. Alert and oriented to person, place, and time.  Vascular Examination:  DP pulse is 2/4  PT pulse is diminished due to edema  Capillary refill is immediate  Edema is present bilateral  Varicosities present bilateral  Integumentary Examination:   Left hallux toenail is thickened, elongated, dystrophic, discolored with subungual debris   Digital hair growth is absent  Skin is of diminished texture and decreased  turgor.  Neurological Examination:  Sharp/dull is present to right and is present to left.  Parasthesias absent.  Musculoskeletal Examination:  B/L pedal muscle strength diminished      LABS & IMAGING:     Lab Results   Component Value Date    GLU 79 02/21/2024    BUN 16 02/21/2024    CREATSERUM 1.02 02/21/2024    BUNCREA 15.7 02/21/2024    ANIONGAP 4 02/21/2024    GFRAA 59 (L) 07/13/2022    GFRNAA 51 (L) 07/13/2022    CA 9.4 02/21/2024     02/21/2024    K 4.5 02/21/2024     02/21/2024    CO2 30.0 02/21/2024    OSMOCALC 292 02/21/2024        Lab Results   Component Value Date     02/21/2024    A1C 5.9 (H) 02/21/2024        No results found.     ASSESSMENT AND PLAN:   Diagnoses and all orders for this visit:    Onychomycosis    Gait instability    Other orders  -     ketoconazole 2 % External Cream; Apply thin layer to left great toenail daily        Plan:     Reviewed treatment options for nail dystrophy / onychomycosis including:   No treatment and monitoring, topical meds, oral meds, nail removal   Advantages and disadvantages of each reviewed. Using oral agents would require regular blood test monitoring due to risk of hepatotoxicity and other adverse reactions including drug interactions.   Topical meds are much safer yet carry very low efficacy.      Prescribed topical ketoconazole cream, pt to apply to left hallux toenail daily    RTC 3-4 months if needed for re evaluation of left hallux fungal toenail    No follow-ups on file.    Nahum Good DPM  5/7/2024

## 2024-05-16 NOTE — PROGRESS NOTES
HPI:    Patient ID: Linda Cleveland is a 89 year old female.    HPI    Accompanied by 24 live in caregivers  Ceci Mathews with progressive recurrent confustion     And insmnia  halluciationns and confusion the last year  She is not walking  mostly wheelcair bound    Naeed assist with almonst all ADL.s  Work up  01/02/2024  Procedure: MRI BRAIN (CPT=70551)  =====  CONCLUSION:   1. No acute intracranial abnormality. Specifically, no evidence of acute or early subacute infarction.  2. Background mild-to-moderate presumed sequelae of chronic microangiopathy throughout both cerebral hemispheres.  3. Probable T2 shine through from a late subacute to chronic left cerebellar lacunar infarct.  4. Image degradation secondary to patient related motion artifact.        elm-remote     Dictated by (CST): Ryley Pugh MD on 1/03/2024 at 7:15 AM      01/03/2024  Procedure: XR VIDEO SWALLOW (CPT=74230)   -----------------------------------------------------------------------------------------------------------------------------------------------                   ONCLUSION:   1. No evidence of laryngeal penetration or aspiration.  /80 (BP Location: Right arm, Patient Position: Sitting, Cuff Size: adult)   Pulse 92   Ht 5' 10\" (1.778 m)   Wt 186 lb (84.4 kg)   BMI 26.69 kg/m²   Wt Readings from Last 6 Encounters:   05/01/24 186 lb (84.4 kg)   02/21/24 186 lb (84.4 kg)   11/21/23 170 lb (77.1 kg)   10/10/23 171 lb (77.6 kg)   05/18/23 160 lb 15 oz (73 kg)   05/03/23 160 lb (72.6 kg)     Body mass index is 26.69 kg/m².  HGBA1C:    Lab Results   Component Value Date    A1C 5.9 (H) 02/21/2024    A1C 5.8 (H) 10/10/2023    A1C 5.3 12/06/2022     02/21/2024         Review of Systems      Current Outpatient Medications   Medication Sig Dispense Refill    Multiple Vitamins-Minerals (ICAPS AREDS FORMULA) Oral Tab Take by mouth.      OLANZapine (ZYPREXA) 2.5 MG Oral Tab Take 1 tablet (2.5 mg total) by mouth nightly. 30 tablet 0     rivaroxaban (XARELTO) 20 MG Oral Tab Take 1 tablet (20 mg total) by mouth nightly. 90 tablet 1    zolpidem (AMBIEN) 5 MG Oral Tab Take 1 tablet (5 mg total) by mouth nightly as needed for Sleep. 30 tablet 0    levothyroxine 50 MCG Oral Tab Take 1 tablet (50 mcg total) by mouth before breakfast. 90 tablet 3    rosuvastatin 40 MG Oral Tab Take 1 tablet (40 mg total) by mouth daily. 90 tablet 3    ergocalciferol 1.25 MG (86262 UT) Oral Cap Take 1 capsule (50,000 Units total) by mouth every 14 (fourteen) days. 6 capsule 3    DULoxetine HCl 60 MG Oral Capsule Delayed Release Sprinkle Take 60 mg by mouth daily. 90 capsule 3    losartan 25 MG Oral Tab 1 tablet (25 mg total).      dilTIAZem HCl ER Coated Beads 180 MG Oral Capsule SR 24 Hr Take 1 capsule (180 mg total) by mouth daily. 30 capsule 3    ketoconazole 2 % External Cream Apply thin layer to left great toenail daily 30 g 2     Allergies:No Known Allergies    HISTORY:  Past Medical History:    Blepharitis    Capsular opacification    YAG laser    Cataract    Cup to disc asymmetry    Increased C/D ratio    Dry eyes, bilateral    Essential hypertension    Glaucoma suspect of both eyes    Hyperlipidemia    Map-dot-fingerprint corneal dystrophy    Meibomian gland dysfunction    Permanent atrial fibrillation (HCC)    Preglaucoma    Pseudophakia of both eyes    Tearing eyes    Uncomplicated varicose veins    Vitamin D deficiency    Vitreous floater      Past Surgical History:   Procedure Laterality Date    Cataract extraction w/  intraocular lens implant Right 02/09/2005    Dr. Hall- mono-vision OD for near    Cataract extraction w/  intraocular lens implant Left 03/02/2005    Dr. Hall- mono-vision OS for distance    Yag capsulotomy - ou - both eyes  2007, 2008    OS 12/2/08 - JIMMIE, OD 3/1/07- Dr. Hall      Family History   Problem Relation Age of Onset    Other (Varicose veins) Mother     Other (Varicose veins) Father     Diabetes Neg     Glaucoma Neg     Macular  degeneration Neg       Social History:   Social History     Socioeconomic History    Marital status:    Tobacco Use    Smoking status: Never     Passive exposure: Never    Smokeless tobacco: Never   Vaping Use    Vaping status: Never Used   Substance and Sexual Activity    Alcohol use: No     Alcohol/week: 0.0 standard drinks of alcohol    Drug use: No   Other Topics Concern    Caffeine Concern Yes     Comment: coffee, ocassionally     Social Determinants of Health     Physical Activity: High Risk (8/15/2023)    [facility]    Exercise Vital Sign     On average, how many days per week do you engage in moderate to strenuous exercise (like a brisk walk)?: 0 days     On average, how many minutes do you engage in exercise at this level?: 0 min        PHYSICAL EXAM:    Physical Exam  05/22/2023  Procedure: US VENOUS DOPPLER LEG BILAT - DIAG IMG (CPT=93970) There are bilateral DVT/SVTs.     Right lower extremity:  Thrombus is seen in the right proximal femoral vein near the bifurcation as well as in the right proximal deep femoral vein.  Thrombus is also seen in the posterior tibial vein, right anterior peroneal vein, and right great   saphenous vein in the proximal thigh to mid calf.  There is a fluid collection in the popliteal fossa measuring 4 x 5.7 x 0.6 cm likely a popliteal cyst.     Left lower extremity:  Thrombus is seen in the left greater saphenous vein from the level of the knee to the distal calf.  Thrombus is seen in the left common femoral vein.  There is thrombus in the left gastrocnemius veins.  Fluid collection in the left   popliteal fossa measuring 4.3 x 6 x 0.5 cm, likely a popliteal cyst.              =====  CONCLUSION:Bilateral DVT/SVT as above in this patient with known pulmonary emboli.      Impression   CONCLUSION:     Lobar, segmental, subsegmental PE throughout all lobes of the right lung     No signs of RV strain      Penetrating ulcer in the proximal descending thoracic aorta        Dictated by (CST): Jose J Raymundo MD on 5/22/2023 at 12:37 PM      Finalized by (CST): Jose J Raymundo MD on 5/22/2023 at 12:52 PM         Spent total time    40 minutes on obtaining history / chart review, evaluating patient / performing medically appropriate exam, discussing treatment options, counseling / educating, and completing documentation, coordinating care.         ASSESSMENT/PLAN:   Nocturnal confusion/ insomnia  Plan: Neuro Referral - In Network        Noted cognitive dysfunciton the last year   Difficulty falling asleep  stays up at night restless  trying to get out of bed    She sleeps in a bed with bed rails   is suggesting posslbe need for a sitter  Currently pt recognized me  she is appropriate  possible sundowning    Night time confusion rx olanzapine    (Z86.718) History of DVT (deep vein thrombosis)  Plan:pt on zaretlo    (Z86.711) History of pulmonary embolus (PE)  Plan:pt on zarelot    (I48.21) Permanent atrial fibrillation (HCC)  Plan: pt on zarelto  followed by dR carmichael from [facility]    (R73.01) Abnormal fasting glucose  Plan: HGBA1C:    Lab Results   Component Value Date    A1C 5.9 (H) 02/21/2024    A1C 5.8 (H) 10/10/2023    A1C 5.3 12/06/2022     02/21/2024     Controlledm onitor    (M47.816) Lumbar spondylosis  Plan: chornic  seen by physiatry  Pain controlled with cymbalta    (H35.30) ARMD (age-related macular degeneration), bilateral  Plan: ongoing follow up with opthalmology    (Z79.01) Current use of long term anticoagulation  Plan: fall precaution bleeding shamikatcari    (I10) Essential hypertension  Plan: /80 (BP Location: Right arm, Patient Position: Sitting, Cuff Size: adult)   Pulse 92   Ht 5' 10\" (1.778 m)   Wt 186 lb (84.4 kg)   BMI 26.69 kg/m²   Controlled monitor    (Z91.81) At high risk for falls  Plan: fall risk   Will order home health and PT OT    (R53.1) Weakness  generalized  Plan: will order home health PT OT    (R26.9) Gait abnormality  Plan: need gait and balance training        No orders of the defined types were placed in this encounter.      Meds This Visit:  Requested Prescriptions     Signed Prescriptions Disp Refills    OLANZapine (ZYPREXA) 2.5 MG Oral Tab 30 tablet 0     Sig: Take 1 tablet (2.5 mg total) by mouth nightly.       Imaging & Referrals:  NEURO - INTERNAL        ID#[id number]

## 2024-05-20 ENCOUNTER — PATIENT MESSAGE (OUTPATIENT)
Dept: INTERNAL MEDICINE CLINIC | Facility: CLINIC | Age: 89
End: 2024-05-20

## 2024-05-20 NOTE — TELEPHONE ENCOUNTER
From: Linda Cleveland  To: Natalie Goetz  Sent: 5/20/2024 7:15 AM CDT  Subject: Home Health Referral     Hi Dr. Goetz,  Just want to follow Referral to  for nursing and PT please. Please fax to R # [phone].   Thank you!

## 2024-05-30 RX ORDER — OLANZAPINE 2.5 MG/1
2.5 TABLET, FILM COATED ORAL NIGHTLY
Qty: 30 TABLET | Refills: 0 | OUTPATIENT
Start: 2024-05-30

## 2024-05-30 NOTE — TELEPHONE ENCOUNTER
Please Review. Protocol Failed; No Protocol   Medication prescribed at last office visit: Written for Quantity: 30 Refills: 0 Is refill appropriate?   Noted from last Office Visit:  Night time confusion rx olanzapine     Recent Visits  Date Type Provider Dept   05/01/24 Office Visit Natalie Morrissey MD [facility]       Requested Prescriptions   Pending Prescriptions Disp Refills    OLANZAPINE 2.5 MG Oral Tab [Pharmacy Med Name: OLANZAPINE 2.5MG TABLETS] 30 tablet 0     Sig: TAKE 1 TABLET(2.5 MG) BY MOUTH EVERY NIGHT       There is no refill protocol information for this order            Future Appointments         Provider Department Appt Notes    In 2 months Adi Bay,  [facility] ref by dr morrissey/Altered mental status, unspecified altered mental status type/ medicare          Recent Outpatient Visits              3 weeks ago Onychomycosis    [facility], [place], Eric Hal, DPM    Office Visit    4 weeks ago Nocturnal confusion    [facility], Natalie Oates MD    Office Visit    3 months ago Preop exam for internal medicine    [facility], Natalie Oates MD    Office Visit    [facility]    [facility]    7 months ago [facility]    Office Visit

## 2024-05-31 RX ORDER — OLANZAPINE 2.5 MG/1
2.5 TABLET, FILM COATED ORAL NIGHTLY
OUTPATIENT
Start: 2024-05-31

## 2024-05-31 RX ORDER — DILTIAZEM HYDROCHLORIDE 180 MG/1
180 CAPSULE, COATED, EXTENDED RELEASE ORAL DAILY
Qty: 90 CAPSULE | Refills: 2 | Status: SHIPPED | OUTPATIENT
Start: 2024-05-31

## 2024-06-13 ENCOUNTER — MED REC SCAN ONLY (OUTPATIENT)
Dept: INTERNAL MEDICINE CLINIC | Facility: CLINIC | Age: 89
End: 2024-06-13

## 2024-07-03 ENCOUNTER — NURSE TRIAGE (OUTPATIENT)
Dept: INTERNAL MEDICINE CLINIC | Facility: CLINIC | Age: 89
End: 2024-07-03

## 2024-07-03 NOTE — TELEPHONE ENCOUNTER
Action Requested: Summary for Provider     []  Critical Lab, Recommendations Needed  [] Need Additional Advice  [x]   FYI    []   Need Orders  [] Need Medications Sent to Pharmacy  []  Other     SUMMARY: Call from patient's caregiver Ceci, on ELVI, verified patient's name/.  She was instructed by [facility] to speak to nurse because she booked a MyChart appointment to evaluate groin pain.  Patient does have some cognitive difficulty.  Ceci states that yesterday after her physical therapy (through [facility]) she complained of pain in her right groin.  She had been on an exercise bicycle.  Ceci states patient is not moaning in pain or restless.  Ceci denies UTI symptoms, hematuria, burning, dark urine, fever, chills, or nausea/vomiting.  Advised if symptoms worsen, take to ED or Immediate Care.  Otherwise, advised her to discuss symptoms with the home health team so they can evaluate and communicate to our office.    Ceci agreed to plan.  This message sent to Dr Goetz as FYI.    Future Appointments   Date Time Provider Department Center   2024  3:00 PM Natalie Goetz MD [facility]   2024  1:00 PM Adi Bay DO [facility]     Reason for call: Acute pain in right groin area  Onset: yesterday

## 2024-07-05 NOTE — TELEPHONE ENCOUNTER
This RN called Ceci for update.  States patient seems better, not even asking for Tylenol.  She has call in to the Home Health physical therapist.    Offered her appointment today, but patient is sleeping presently as they stayed up to watch fireworks last evening.  Ceci lópez will take her to Immediate Care or ED if symptoms recur or worsen.

## 2024-08-08 ENCOUNTER — MED REC SCAN ONLY (OUTPATIENT)
Dept: NEUROLOGY | Facility: CLINIC | Age: 89
End: 2024-08-08

## 2024-08-13 ENCOUNTER — OFFICE VISIT (OUTPATIENT)
Dept: INTERNAL MEDICINE CLINIC | Facility: CLINIC | Age: 89
End: 2024-08-13

## 2024-08-13 ENCOUNTER — LAB ENCOUNTER (OUTPATIENT)
Dept: LAB | Age: 89
End: 2024-08-13
Attending: INTERNAL MEDICINE
Payer: MEDICARE

## 2024-08-13 VITALS
BODY MASS INDEX: 27.63 KG/M2 | HEART RATE: 74 BPM | DIASTOLIC BLOOD PRESSURE: 63 MMHG | SYSTOLIC BLOOD PRESSURE: 100 MMHG | WEIGHT: 193 LBS | HEIGHT: 70 IN

## 2024-08-13 DIAGNOSIS — N18.31 STAGE 3A CHRONIC KIDNEY DISEASE (HCC): ICD-10-CM

## 2024-08-13 DIAGNOSIS — Z86.711 HISTORY OF PULMONARY EMBOLISM: ICD-10-CM

## 2024-08-13 DIAGNOSIS — I10 ESSENTIAL HYPERTENSION: ICD-10-CM

## 2024-08-13 DIAGNOSIS — G30.1 SEVERE LATE ONSET ALZHEIMER'S DEMENTIA WITH OTHER BEHAVIORAL DISTURBANCE (HCC): ICD-10-CM

## 2024-08-13 DIAGNOSIS — Z79.01 CURRENT USE OF LONG TERM ANTICOAGULATION: ICD-10-CM

## 2024-08-13 DIAGNOSIS — Z86.718 HISTORY OF DVT OF LOWER EXTREMITY: ICD-10-CM

## 2024-08-13 DIAGNOSIS — E55.9 VITAMIN D DEFICIENCY: ICD-10-CM

## 2024-08-13 DIAGNOSIS — M47.816 LUMBAR SPONDYLOSIS: ICD-10-CM

## 2024-08-13 DIAGNOSIS — R26.9 GAIT ABNORMALITY: ICD-10-CM

## 2024-08-13 DIAGNOSIS — Z91.81 AT HIGH RISK FOR FALLS: ICD-10-CM

## 2024-08-13 DIAGNOSIS — H35.30 ARMD (AGE-RELATED MACULAR DEGENERATION), BILATERAL: ICD-10-CM

## 2024-08-13 DIAGNOSIS — I10 ESSENTIAL HYPERTENSION: Primary | ICD-10-CM

## 2024-08-13 DIAGNOSIS — E78.5 HYPERLIPIDEMIA, UNSPECIFIED HYPERLIPIDEMIA TYPE: ICD-10-CM

## 2024-08-13 DIAGNOSIS — F02.C18 SEVERE LATE ONSET ALZHEIMER'S DEMENTIA WITH OTHER BEHAVIORAL DISTURBANCE (HCC): ICD-10-CM

## 2024-08-13 DIAGNOSIS — I48.21 PERMANENT ATRIAL FIBRILLATION (HCC): ICD-10-CM

## 2024-08-13 LAB
ALBUMIN SERPL-MCNC: 4.2 G/DL (ref 3.2–4.8)
ALBUMIN/GLOB SERPL: 1.6 {RATIO} (ref 1–2)
ALP LIVER SERPL-CCNC: 130 U/L
ALT SERPL-CCNC: 18 U/L
ANION GAP SERPL CALC-SCNC: 8 MMOL/L (ref 0–18)
AST SERPL-CCNC: 22 U/L (ref ?–34)
BILIRUB SERPL-MCNC: 0.7 MG/DL (ref 0.2–1.1)
BUN BLD-MCNC: 20 MG/DL (ref 9–23)
BUN/CREAT SERPL: 16.1 (ref 10–20)
CALCIUM BLD-MCNC: 9.7 MG/DL (ref 8.7–10.4)
CHLORIDE SERPL-SCNC: 106 MMOL/L (ref 98–112)
CHOLEST SERPL-MCNC: 139 MG/DL (ref ?–200)
CO2 SERPL-SCNC: 29 MMOL/L (ref 21–32)
CREAT BLD-MCNC: 1.24 MG/DL
DEPRECATED RDW RBC AUTO: 54.8 FL (ref 35.1–46.3)
EGFRCR SERPLBLD CKD-EPI 2021: 42 ML/MIN/1.73M2 (ref 60–?)
ERYTHROCYTE [DISTWIDTH] IN BLOOD BY AUTOMATED COUNT: 15.1 % (ref 11–15)
FASTING PATIENT LIPID ANSWER: NO
FASTING STATUS PATIENT QL REPORTED: NO
GLOBULIN PLAS-MCNC: 2.7 G/DL (ref 2–3.5)
GLUCOSE BLD-MCNC: 104 MG/DL (ref 70–99)
HCT VFR BLD AUTO: 42.2 %
HDLC SERPL-MCNC: 56 MG/DL (ref 40–59)
HGB BLD-MCNC: 13.9 G/DL
LDLC SERPL CALC-MCNC: 66 MG/DL (ref ?–100)
MCH RBC QN AUTO: 32.1 PG (ref 26–34)
MCHC RBC AUTO-ENTMCNC: 32.9 G/DL (ref 31–37)
MCV RBC AUTO: 97.5 FL
NONHDLC SERPL-MCNC: 83 MG/DL (ref ?–130)
OSMOLALITY SERPL CALC.SUM OF ELEC: 299 MOSM/KG (ref 275–295)
PLATELET # BLD AUTO: 301 10(3)UL (ref 150–450)
POTASSIUM SERPL-SCNC: 5.3 MMOL/L (ref 3.5–5.1)
PROT SERPL-MCNC: 6.9 G/DL (ref 5.7–8.2)
RBC # BLD AUTO: 4.33 X10(6)UL
SODIUM SERPL-SCNC: 143 MMOL/L (ref 136–145)
TRIGL SERPL-MCNC: 90 MG/DL (ref 30–149)
VIT D+METAB SERPL-MCNC: 56.6 NG/ML (ref 30–100)
VLDLC SERPL CALC-MCNC: 14 MG/DL (ref 0–30)
WBC # BLD AUTO: 6.2 X10(3) UL (ref 4–11)

## 2024-08-13 PROCEDURE — 80053 COMPREHEN METABOLIC PANEL: CPT

## 2024-08-13 PROCEDURE — 80061 LIPID PANEL: CPT

## 2024-08-13 PROCEDURE — 85027 COMPLETE CBC AUTOMATED: CPT

## 2024-08-13 PROCEDURE — 36415 COLL VENOUS BLD VENIPUNCTURE: CPT

## 2024-08-13 PROCEDURE — 82306 VITAMIN D 25 HYDROXY: CPT

## 2024-08-13 PROCEDURE — G2211 COMPLEX E/M VISIT ADD ON: HCPCS | Performed by: INTERNAL MEDICINE

## 2024-08-13 PROCEDURE — 99215 OFFICE O/P EST HI 40 MIN: CPT | Performed by: INTERNAL MEDICINE

## 2024-08-13 PROCEDURE — 99499 UNLISTED E&M SERVICE: CPT | Performed by: INTERNAL MEDICINE

## 2024-08-15 NOTE — PROGRESS NOTES
HPI:    Patient ID: Linda Cleveland is a 89 year old female.    HPI    Follow up   Accompanied by caregivers  Ceci and Jayson  Pt seen by neurology DR Bay  Pt sitting up wheelchair she recognized me and asked about my family she has significant speech abnormality stuttering    Night time sleep disturbance and hallucination  Looking for her  trying to get out of bed  Olanzapine nightly as needed DR Bay added doxepin 6 mg low dose    Per Ceci she also gives her  tylenol  PM  Pt has dry mouth which could be a contributing factor  To her speech abnormality    Offhand I adivsed ceci not to gvie her tyelnol PM it has benadryl which is anticholinergic and can make her more confuse  Doxepin contributing factor to dry mouth stutterring  She looks for her  paranoid at times  Per ceci patient would like to go to [place]  Ceci and Jaysno will accompany her to [place]  Patient is quite confused at time and memory is progressively worse. She has chronic medical and behavioral problem not sure if she can handle the   Long trip and her dementia is progressive   Per DR Bay cannot use Aricept due to her arrytmia  Per DR Bay's  notes  Avoid anticholinergic medications in patients with mild cognitive impairment.  In particular the following medications which have a high anticholinergic activity:  Antihistamines including doxylamine, hydroxyzine, meclizine.  TCAs: amitriptyline, clomipramine, desipramine, doxepin, imipramine, nortriptyline.  First generation antipsychotics and clozapine.  Muscle relaxants: Tizanidine (lower anticholinergic affect is seen w/ cyclobenzaprine, baclofen, and methocarbamol.)  Antiemetics:  hydroxyzine, meclizine, promethazine, scopolamine.  Antispasmodics including atropine, clozapine, hycoasmine, glycopyrrolate.  Medications for overactive bladder including darifenacin oxybutynin, solifenacin (vesicare), tolterodine  Consider stopping other neurologic agents that have low  anticholinergic activity including carbamazepine, lithium, nefazodone, oxcarbazepine, phenelzine, and trazodone.     Advised Ceci to stop the Doxepin for now    Jayson takes care of her 7 to 3 pm  Ceci takes over 3 to 11  And she has a night time caregiver form 11 pm to 7 AM  Round the clock  she has a sitter.      /63 (BP Location: Right arm, Patient Position: Sitting, Cuff Size: large)   Pulse 74   Ht 5' 10\" (1.778 m)   Wt 193 lb (87.5 kg)   BMI 27.69 kg/m²   Wt Readings from Last 6 Encounters:   08/13/24 193 lb (87.5 kg)   05/01/24 186 lb (84.4 kg)   02/21/24 186 lb (84.4 kg)   11/21/23 170 lb (77.1 kg)   10/10/23 171 lb (77.6 kg)   05/18/23 160 lb 15 oz (73 kg)     Body mass index is 27.69 kg/m².  HGBA1C:    Lab Results   Component Value Date    A1C 5.9 (H) 02/21/2024    A1C 5.8 (H) 10/10/2023    A1C 5.3 12/06/2022     02/21/2024         Review of Systems   Unable to perform ROS: Dementia   Psychiatric/Behavioral:  Positive for behavioral problems and confusion.          Current Outpatient Medications   Medication Sig Dispense Refill    Doxepin HCl 6 MG Oral Tab Take 6 mg by mouth every evening. 30 tablet 1    OLANZapine (ZYPREXA) 2.5 MG Oral Tab Take 1 tablet (2.5 mg total) by mouth nightly. 90 tablet 1    dilTIAZem HCl ER Coated Beads 180 MG Oral Capsule SR 24 Hr Take 1 capsule (180 mg total) by mouth daily. 90 capsule 1    losartan 25 MG Oral Tab Take 1 tablet (25 mg total) by mouth daily. 90 tablet 1    ketoconazole 2 % External Cream Apply thin layer to left great toenail daily 30 g 2    Multiple Vitamins-Minerals (ICAPS AREDS FORMULA) Oral Tab Take by mouth.      rivaroxaban (XARELTO) 20 MG Oral Tab Take 1 tablet (20 mg total) by mouth nightly. 90 tablet 1    levothyroxine 50 MCG Oral Tab Take 1 tablet (50 mcg total) by mouth before breakfast. 90 tablet 3    rosuvastatin 40 MG Oral Tab Take 1 tablet (40 mg total) by mouth daily. 90 tablet 3    ergocalciferol 1.25 MG (33276 UT) Oral Cap  Take 1 capsule (50,000 Units total) by mouth every 14 (fourteen) days. 6 capsule 3    DULoxetine HCl 60 MG Oral Capsule Delayed Release Sprinkle Take 60 mg by mouth daily. 90 capsule 3     Allergies:No Known Allergies    HISTORY:  Past Medical History:    Blepharitis    Capsular opacification    YAG laser    Cataract    Cup to disc asymmetry    Increased C/D ratio    Dry eyes, bilateral    Essential hypertension    Glaucoma suspect of both eyes    Hyperlipidemia    Map-dot-fingerprint corneal dystrophy    Meibomian gland dysfunction    Permanent atrial fibrillation (HCC)    Preglaucoma    Pseudophakia of both eyes    Tearing eyes    Uncomplicated varicose veins    Vitamin D deficiency    Vitreous floater      Past Surgical History:   Procedure Laterality Date    Cataract extraction w/  intraocular lens implant Right 02/09/2005    Dr. Hall- mono-vision OD for near    Cataract extraction w/  intraocular lens implant Left 03/02/2005    Dr. Hall- mono-vision OS for distance    Yag capsulotomy - ou - both eyes  2007, 2008    OS 12/2/08 - JIMMIE, OD 3/1/07- Dr. Hall      Family History   Problem Relation Age of Onset    Other (Varicose veins) Mother     Other (Varicose veins) Father     Diabetes Neg     Glaucoma Neg     Macular degeneration Neg       Social History:   Social History     Socioeconomic History    Marital status:    Tobacco Use    Smoking status: Never     Passive exposure: Never    Smokeless tobacco: Never   Vaping Use    Vaping status: Never Used   Substance and Sexual Activity    Alcohol use: No     Alcohol/week: 0.0 standard drinks of alcohol    Drug use: No   Other Topics Concern    Caffeine Concern Yes     Comment: coffee, ocassionally     Social Determinants of Health     Physical Activity: High Risk (8/15/2023)    [facility]    Exercise Vital Sign     On average, how many days per week do you engage in moderate to  strenuous exercise (like a brisk walk)?: 0 days     On average, how many minutes do you engage in exercise at this level?: 0 min        PHYSICAL EXAM:    Physical Exam  Constitutional:       Appearance: She is obese. She is not ill-appearing.   Cardiovascular:      Rate and Rhythm: Normal rate. Rhythm irregular.   Pulmonary:      Effort: Pulmonary effort is normal.      Breath sounds: Normal breath sounds.   Abdominal:      General: Bowel sounds are normal.      Palpations: Abdomen is soft.   Neurological:      Mental Status: She is alert. She is disoriented.      Motor: Weakness present.      Gait: Gait abnormal.   Psychiatric:         Mood and Affect: Mood normal.         Behavior: Behavior normal.              ASSESSMENT/PLAN:   (I10) Essential hypertension  (primary encounter diagnosis)  Plan: CBC, Platelet; No Differential, Comp Metabolic         Panel (14)      /63 (BP Location: Right arm, Patient Position: Sitting, Cuff Size: large)   Pulse 74   Ht 5' 10\" (1.778 m)   Wt 193 lb (87.5 kg)   BMI 27.69 kg/m²   Controlled monitor    (G30.1,  F02.C18) Severe late onset Alzheimer's dementia with other behavioral disturbance (HCC)  Plan: 24 hour care  Home health  physical therapy    (I48.21) Permanent atrial fibrillation  Plan: chronic  followed by Dr Hernández    (E78.5) Hyperlipidemia, unspecified hyperlipidemia type  Plan: Lipid Panel        Healthy diet  avoid fatty foods    (E55.9) Vitamin D deficiency  Plan: Vitamin D        supplement    (R26.9) Gait abnormality  Plan: fall precaution  continue physical therapy    (N18.31) Stage 3a chronic kidney disease (HCC)  Plan: US KIDNEYS (CPT=76775)        Chronic monitor  US kidney ordered    (M47.816) Lumbar spondylosis  Plan: chronic pain controlled  Pt on cymbalta for pain mgt and reactive depression anxisty    (Z86.711) History of pulmonary embolism  Plan: on anticoagulation    (Z86.718) History of DVT of lower extremity  Plan: on anticoagulaiton    (H35.30)  ARMD (age-related macular degeneration), bilateral  Plan: ongoing follow up with otphalmolgy    (Z79.01) Current use of long term anticoagulation  Plan: bleeding precaution    (Z91.81) At high risk for falls  Plan: fall precaution    Caregiver voiced understanding    Spent total time  40  minutes on obtaining history / chart review, evaluating patient / performing medically appropriate exam, discussing treatment options, counseling / educating, and completing documentation, coordinating care.        Orders Placed This Encounter   Procedures    CBC, Platelet; No Differential    Comp Metabolic Panel (14)    Lipid Panel    Vitamin D       Meds This Visit:  Requested Prescriptions      No prescriptions requested or ordered in this encounter       Imaging & Referrals:  US KIDNEYS (CPT=76775)        ID#[id number]

## 2024-09-09 ENCOUNTER — NURSE TRIAGE (OUTPATIENT)
Dept: INTERNAL MEDICINE CLINIC | Facility: CLINIC | Age: 89
End: 2024-09-09

## 2024-09-09 NOTE — TELEPHONE ENCOUNTER
Action Requested: Summary for Provider     []  Critical Lab, Recommendations Needed  [x] Need Additional Advice  []   FYI    []   Need Orders  [] Need Medications Sent to Pharmacy  []  Other     SUMMARY: Patient's care giver Ceci asks if Dr Goetz can see patient for visit at the end of the day today, or tomorrow after 4pm?    Dr Goetz, please advise.    Reason for call: Cough, Covid Pos 8-30-24  Onset: 8-30-24     Patient /caregiver Ceci calling who has release of information permission,verified name and date of birth. Patient was Positive for Covid 8-30-24 and most symptoms have resolved. She is is concerned about lingering congested cough and would like to see Dr Goetz. Today she is afebrile, staying hydrated, eating regularly with reduced appetite, Pulse Ox 95-97%.   Reviewed care advice to continue home carre, office will contact her with Dr Goetz's recommendations. Ceci verbalized understanding.    Reason for Disposition   HIGH RISK patient (e.g., weak immune system, age > 64 years, obesity with BMI of 30 or higher, pregnant, chronic lung disease or other chronic medical condition) and COVID symptoms (e.g., cough, fever)  (Exceptions: Already seen by doctor or NP/PA and no new or worsening symptoms.)    Protocols used: Coronavirus (COVID-19) Diagnosed or [id number]-D-DO

## 2024-09-11 ENCOUNTER — HOSPITAL ENCOUNTER (OUTPATIENT)
Dept: GENERAL RADIOLOGY | Age: 89
Discharge: HOME | End: 2024-09-11
Attending: INTERNAL MEDICINE
Payer: MEDICARE

## 2024-09-11 ENCOUNTER — OFFICE VISIT (OUTPATIENT)
Dept: INTERNAL MEDICINE CLINIC | Facility: CLINIC | Age: 89
End: 2024-09-11

## 2024-09-11 VITALS
HEIGHT: 70 IN | HEART RATE: 83 BPM | SYSTOLIC BLOOD PRESSURE: 130 MMHG | OXYGEN SATURATION: 96 % | BODY MASS INDEX: 28 KG/M2 | DIASTOLIC BLOOD PRESSURE: 80 MMHG

## 2024-09-11 DIAGNOSIS — Z86.711 HISTORY OF PULMONARY EMBOLISM: ICD-10-CM

## 2024-09-11 DIAGNOSIS — R05.1 ACUTE COUGH: ICD-10-CM

## 2024-09-11 DIAGNOSIS — I10 ESSENTIAL HYPERTENSION: ICD-10-CM

## 2024-09-11 DIAGNOSIS — R26.9 GAIT ABNORMALITY: ICD-10-CM

## 2024-09-11 DIAGNOSIS — Z79.01 CURRENT USE OF LONG TERM ANTICOAGULATION: ICD-10-CM

## 2024-09-11 DIAGNOSIS — Z86.718 HISTORY OF DVT OF LOWER EXTREMITY: ICD-10-CM

## 2024-09-11 DIAGNOSIS — I48.21 PERMANENT ATRIAL FIBRILLATION (HCC): ICD-10-CM

## 2024-09-11 DIAGNOSIS — G30.1 SEVERE LATE ONSET ALZHEIMER'S DEMENTIA WITH OTHER BEHAVIORAL DISTURBANCE (HCC): ICD-10-CM

## 2024-09-11 DIAGNOSIS — F02.C18 SEVERE LATE ONSET ALZHEIMER'S DEMENTIA WITH OTHER BEHAVIORAL DISTURBANCE (HCC): ICD-10-CM

## 2024-09-11 DIAGNOSIS — U07.1 COVID-19 VIRUS INFECTION: Primary | ICD-10-CM

## 2024-09-11 PROCEDURE — 71046 X-RAY EXAM CHEST 2 VIEWS: CPT | Performed by: INTERNAL MEDICINE

## 2024-09-11 PROCEDURE — 99214 OFFICE O/P EST MOD 30 MIN: CPT | Performed by: INTERNAL MEDICINE

## 2024-09-25 ENCOUNTER — LAB ENCOUNTER (OUTPATIENT)
Dept: LAB | Facility: HOSPITAL | Age: 89
End: 2024-09-25
Attending: Other
Payer: MEDICARE

## 2024-09-25 ENCOUNTER — OFFICE VISIT (OUTPATIENT)
Dept: NEUROLOGY | Facility: CLINIC | Age: 89
End: 2024-09-25
Payer: MEDICARE

## 2024-09-25 DIAGNOSIS — F03.C3 SEVERE DEMENTIA WITH MOOD DISTURBANCE, UNSPECIFIED DEMENTIA TYPE (HCC): ICD-10-CM

## 2024-09-25 DIAGNOSIS — G47.00 INSOMNIA, UNSPECIFIED TYPE: ICD-10-CM

## 2024-09-25 DIAGNOSIS — F03.C3 SEVERE DEMENTIA WITH MOOD DISTURBANCE, UNSPECIFIED DEMENTIA TYPE (HCC): Primary | ICD-10-CM

## 2024-09-25 LAB
Q-T INTERVAL: 430 MS
QRS DURATION: 92 MS
QTC CALCULATION (BEZET): 422 MS
R AXIS: -31 DEGREES
T AXIS: 24 DEGREES
VENTRICULAR RATE: 58 BPM

## 2024-09-25 PROCEDURE — 99214 OFFICE O/P EST MOD 30 MIN: CPT | Performed by: OTHER

## 2024-09-25 PROCEDURE — 93005 ELECTROCARDIOGRAM TRACING: CPT

## 2024-09-25 PROCEDURE — G2211 COMPLEX E/M VISIT ADD ON: HCPCS | Performed by: OTHER

## 2024-09-25 PROCEDURE — 93010 ELECTROCARDIOGRAM REPORT: CPT | Performed by: INTERNAL MEDICINE

## 2024-09-25 RX ORDER — DULOXETIN HYDROCHLORIDE 60 MG/1
60 CAPSULE, DELAYED RELEASE ORAL DAILY
Qty: 90 CAPSULE | Refills: 3 | Status: SHIPPED | OUTPATIENT
Start: 2024-09-25

## 2024-09-25 RX ORDER — DULOXETIN HYDROCHLORIDE 60 MG/1
60 CAPSULE, DELAYED RELEASE ORAL DAILY
Qty: 90 CAPSULE | Refills: 0 | OUTPATIENT
Start: 2024-09-25

## 2024-09-25 NOTE — TELEPHONE ENCOUNTER
Refill passed per [facility] protocols.    Requested Prescriptions   Pending Prescriptions Disp Refills    DULOXETINE 60 MG Oral Cap DR Particles [Pharmacy Med Name: DULOXETINE DR 60MG CAPSULES] 90 capsule 3     Sig: TAKE ONE CAPSULE BY MOUTH DAILY       Psychiatric Non-Scheduled (Anti-Anxiety) Passed - 9/20/2024 10:16 AM        Passed - In person appointment or virtual visit in the past 6 mos or appointment in next 3 mos     Recent Outpatient Visits              Today Severe dementia with mood disturbance, unspecified dementia type (HCC)    [facility], Adi Bean DO    Office Visit    2 weeks ago Acute cough    [facility], Natalie Oates MD    Office Visit    1 month ago Essential hypertension    [facility], Natalie Cabello MD    Office Visit    1 month ago Severe dementia with mood disturbance, unspecified dementia type (HCC)    [facility], Adi Bean DO    Office Visit    4 months ago Onychomycosis    [facility], [place], Eric Hal, DPM    Office Visit                      Passed - Depression Screening completed within the past 12 months

## 2024-09-25 NOTE — PROGRESS NOTES
[facility]  [address] [place] [address], [address]  [facility] [address]  [phone]        Neurology Clinic Follow Up Note    Chief Complaint:  Memory Loss (LOV 08/07/24 Pt present with family for memory.pt Doxepin HCl 6 MG Oral Tab has been discontinued  by  and is not taking OLANZapine (ZYPREXA) 2.5 MG Oral Tab this medication.)      HPI:   Linda Cleveland is a 89 year old woman here in follow up for sleep disturbances associated w/ dementia. She was started on doxepin and switched to olanzapine. Neither have helped. Her insomnia is still severe. She will wake up after falling asleep. She had a sleep study at [facility] in 2018,  which was negative.  She does snore.  There has been observed apnea.  Her caretakers note she is unlikely to tolerate a cpap.  She pull out her external catheter and  her clothes off in the evenings.        ROS: Pertinent positive and negatives per HPI.  All others were reviewed and negative.     Medications:  Current Outpatient Medications   Medication Instructions    Acetaminophen (TYLENOL OR) No dose, route, or frequency recorded.    Dextromethorphan-guaiFENesin (MUCINEX DM OR) Oral    dilTIAZem HCl ER Coated Beads (CARDIZEM CD) 180 mg, Oral, Daily    Doxepin HCl 6 mg, Oral, Every evening    DULoxetine HCl 60 mg, Oral, Daily    ergocalciferol (VITAMIN D2) 50,000 Units, Oral, Every 14 days    ketoconazole 2 % External Cream Apply thin layer to left great toenail daily    levothyroxine (SYNTHROID) 50 mcg, Oral, Before breakfast    losartan (COZAAR) 25 mg, Oral, Daily    Multiple Vitamins-Minerals (ICAPS AREDS FORMULA) Oral Tab Oral    OLANZapine (ZYPREXA) 2.5 mg, Oral, Nightly    rivaroxaban (XARELTO) 20 mg, Oral, Nightly    rosuvastatin (CRESTOR) 40 mg, Oral, Daily        Reviewed and assessed      Objective:  Last vitals and weight :  There is no height or weight on file to calculate BMI.   There were no vitals filed for this visit.   not currently  breastfeeding.  There were no vitals taken for this visit.  Exam:     - General: appears stated age and no distress     Neurologic Exam  - Mental Status: Alert and attentive.   Speech is spontaneous, fluent, and prosodic. Comprehension intact. Repetition intact. Phrase length and rate are normal. No paraphasic errors, neologisms, anomia, neglect, apraxia, or R/L confusion.   - Cranial Nerves: No gaze preference. Visual fields:normal  Pupils are 4mm briskly constricting to 3mm and equally round and reactive to light  in a well lit room. EOMI. No nystagmus. No ptosis. V1-V3 intact B/L to light touch.No pathological facial asymmetry. No flattening of the nasolabial fold. .  Hearing grossly intact.  Tongue midline. No atrophy or fasiculations of the tongue noted. Palate and uvula elevate symmetrically.  Shoulder shrug symmetric.    - Motor:  normal tone,  nml bulk.     Pronator drift: No pronator drift   Index finger Rolling: No orbiting.   Finger Taps: Finger taps are symmetric in rate and amplitude. .     Leg Drift:  none     - Sensory:   Light touch: normal   - Cerebellum: No truncal ataxia. No titubations. No dysmetria.     Exam is unchanged on 09/25/24    Most recent lab results:   Reviewed and assessed  No results found for this or any previous visit (from the past 36 hour(s)).    Diagnostic studies:  Performed an independent visualization of   Imaging revealed:      Assessment     All of the appropriate medications have been tried without success. Options include:  Increasing the dose of olanzapine, but there is an increased risk of death IF IT IS USED FOR BEHAVIORAL DISTURBANCES. May not be associated w/ increased risk of death if used for insomnia.   Stopping olanzapine and switching to Seroquel 25 at bedtime and slowly going up on the dose. Same risks as with olanzapine.  Could try amitriptyline or nortriptyline. These should be avoided in patient w/ mci given that they are anti-cholingeric and can make  cognitive sx worse. However, given the severity of her sx it may not make a difference.         Plan   1. Severe dementia with mood disturbance, unspecified dementia type (HCC)  - SPECIALTY (OTHER) - EXTERNAL  - Psychiatry Referral - In Network    2. Insomnia, unspecified type  - Psychiatry Referral - In Network  Referral to Geriatric psychiatry  See / Abimbola Atkinson first.  Can go up on olanzapine but again, less likely  it will help. Her QTC was 560. Will order repeat EKG.                   This document is not intended to support charting by exception.  Sections left blank in a completed note should be presumed not to have been done.      Disclaimer:   This record was dictated using  Dragon software. There may be errors due to voice recognition problems that were not realized and corrected during the completion of the note.             Thank you for allowing me to participate in the care of your patient.    Adi Bay DO  9/25/2024

## 2024-10-01 NOTE — PROGRESS NOTES
HPI:    Patient ID: Linda Cleveland is a 89 year old female.    Cough  Pertinent negatives include no chills or fever.     Tested positive for COVID 8/30  still coughing  No fever  Pt chronically confused  with sleep disturbance    /80 (BP Location: Right arm, Patient Position: Sitting, Cuff Size: large)   Pulse 83   Ht 5' 10\" (1.778 m)   SpO2 96%   BMI 27.69 kg/m²   Wt Readings from Last 6 Encounters:   08/13/24 193 lb (87.5 kg)   05/01/24 186 lb (84.4 kg)   02/21/24 186 lb (84.4 kg)   11/21/23 170 lb (77.1 kg)   10/10/23 171 lb (77.6 kg)   05/18/23 160 lb 15 oz (73 kg)     Body mass index is 27.69 kg/m².  HGBA1C:    Lab Results   Component Value Date    A1C 5.9 (H) 02/21/2024    A1C 5.8 (H) 10/10/2023    A1C 5.3 12/06/2022     02/21/2024         Review of Systems   Unable to perform ROS: Dementia (hx from caregiver  Janet Reyes)   Constitutional:  Negative for chills and fever.   Respiratory:  Positive for cough.    Musculoskeletal:  Positive for gait problem.   Neurological:  Positive for weakness.   Psychiatric/Behavioral:  Positive for behavioral problems, confusion, hallucinations and sleep disturbance.          Current Outpatient Medications   Medication Sig Dispense Refill    Dextromethorphan-guaiFENesin (MUCINEX DM OR) Take by mouth.      Acetaminophen (TYLENOL OR)       OLANZapine (ZYPREXA) 2.5 MG Oral Tab Take 1 tablet (2.5 mg total) by mouth nightly. 90 tablet 1    dilTIAZem HCl ER Coated Beads 180 MG Oral Capsule SR 24 Hr Take 1 capsule (180 mg total) by mouth daily. 90 capsule 1    losartan 25 MG Oral Tab Take 1 tablet (25 mg total) by mouth daily. 90 tablet 1    Multiple Vitamins-Minerals (ICAPS AREDS FORMULA) Oral Tab Take by mouth.      rivaroxaban (XARELTO) 20 MG Oral Tab Take 1 tablet (20 mg total) by mouth nightly. 90 tablet 1    levothyroxine 50 MCG Oral Tab Take 1 tablet (50 mcg total) by mouth before breakfast. 90 tablet 3    rosuvastatin 40 MG Oral Tab Take 1 tablet (40 mg  total) by mouth daily. 90 tablet 3    DULoxetine 60 MG Oral Cap DR Particles Take 1 capsule (60 mg total) by mouth daily. 90 capsule 3    ketoconazole 2 % External Cream Apply thin layer to left great toenail daily (Patient not taking: Reported on 9/11/2024) 30 g 2    ergocalciferol 1.25 MG (24677 UT) Oral Cap Take 1 capsule (50,000 Units total) by mouth every 14 (fourteen) days. (Patient not taking: Reported on 9/11/2024) 6 capsule 3     Allergies:No Known Allergies    HISTORY:  Past Medical History:    Blepharitis    Capsular opacification    YAG laser    Cataract    Cup to disc asymmetry    Increased C/D ratio    Dry eyes, bilateral    Essential hypertension    Glaucoma suspect of both eyes    Hyperlipidemia    Map-dot-fingerprint corneal dystrophy    Meibomian gland dysfunction    Permanent atrial fibrillation (HCC)    Preglaucoma    Pseudophakia of both eyes    Tearing eyes    Uncomplicated varicose veins    Vitamin D deficiency    Vitreous floater      Past Surgical History:   Procedure Laterality Date    Cataract extraction w/  intraocular lens implant Right 02/09/2005    Dr. Hall- mono-vision OD for near    Cataract extraction w/  intraocular lens implant Left 03/02/2005    Dr. Hall- mono-vision OS for distance    Yag capsulotomy - ou - both eyes  2007, 2008    OS 12/2/08 - JIMMIE, OD 3/1/07- Dr. Hall      Family History   Problem Relation Age of Onset    Other (Varicose veins) Mother     Other (Varicose veins) Father     Diabetes Neg     Glaucoma Neg     Macular degeneration Neg       Social History:   Social History     Socioeconomic History    Marital status:    Tobacco Use    Smoking status: Never     Passive exposure: Never    Smokeless tobacco: Never   Vaping Use    Vaping status: Never Used   Substance and Sexual Activity    Alcohol use: No     Alcohol/week: 0.0 standard drinks of alcohol    Drug use: No   Other Topics Concern    Caffeine Concern Yes     Comment: coffee, ocassionally     Social  Determinants of Health     Physical Activity: High Risk (8/15/2023)    Received from [facility], [facility], [facility]    Exercise Vital Sign     On average, how many days per week do you engage in moderate to strenuous exercise (like a brisk walk)?: 0 days     On average, how many minutes do you engage in exercise at this level?: 0 min        PHYSICAL EXAM:    Physical Exam  Cardiovascular:      Rate and Rhythm: Rhythm irregular.   Pulmonary:      Effort: Pulmonary effort is normal.      Breath sounds: Normal breath sounds.   Abdominal:      Tenderness: There is no abdominal tenderness.   Musculoskeletal:      Right lower leg: Edema (mild) present.      Left lower leg: Edema (mild\) present.   Neurological:      Mental Status: She is alert. She is disoriented.      Gait: Gait abnormal.      Comments: Recognizes me   appropriate at times   forgetful              ASSESSMENT/PLAN:   (U07.1) COVID-19 virus infection  (primary encounter diagnosis)  Plan: no paxlovid due to anticoag   Peristent cough no fever    (R05.1) Acute cough  Plan:  mpression   CONCLUSION:  1. Cardiomegaly.  Tortuous aorta.  2. No acute pulmonary disease.             XR CHEST PA + LAT CHEST (KGB=68025)            (Z86.711) History of pulmonary embolism  Plan:no recurrenc cont anticoag    (Z86.718) History of DVT of lower extremity  Plan: no recurrence  cont anticoag    (I48.21) Permanent atrial fibrillation  Plan: rate controlled    (I10) Essential hypertension  Plan: /80 (BP Location: Right arm, Patient Position: Sitting, Cuff Size: large)   Pulse 83   Ht 5' 10\" (1.778 m)   SpO2 96%   BMI 27.69 kg/m²   Controlled monitor    (Z79.01) Current use of long term anticoagulation  Plan: bleedin precaution    (R26.9) Gait abnormality  Plan: cont  rehabilitaiton    (G30.1,  F02.C18) 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      Meds This Visit:  Requested Prescriptions      No prescriptions requested or ordered in this encounter       Imaging & Referrals:  None        ID#[id number]

## 2024-11-01 NOTE — TELEPHONE ENCOUNTER
Please review. Protocol Failed; No Protocol    Requested Prescriptions   Pending Prescriptions Disp Refills    XARELTO 20 MG Oral Tab [Pharmacy Med Name: XARELTO 20MG TABLETS] 90 tablet 1     Sig: TAKE 1 TABLET(20 MG) BY MOUTH EVERY NIGHT       There is no refill protocol information for this order              Recent Outpatient Visits              1 month ago Severe dementia with mood disturbance, unspecified dementia type (HCC)    [facility] [place]    Office Visit    1 month ago COVID-19 virus infection    [facility], Natalie Oates MD    Office Visit    2 months ago Essential hypertension    [facility], Natalie Cabello MD    Office Visit    2 months ago Severe dementia with mood disturbance, unspecified dementia type (HCC)    [facility], Adi Bean DO    Office Visit    5 months ago [facility]omycosis    [facility]    Office Visit

## 2024-11-20 NOTE — TELEPHONE ENCOUNTER
Current Outpatient Medications:       OLANZapine (ZYPREXA) 2.5 MG Oral Tab, Take 1 tablet (2.5 mg total) by mouth nightly., Disp: 90 tablet, Rfl: 1

## 2024-11-23 RX ORDER — ROSUVASTATIN CALCIUM 40 MG/1
40 TABLET, COATED ORAL DAILY
Qty: 90 TABLET | Refills: 3 | Status: SHIPPED | OUTPATIENT
Start: 2024-11-23

## 2024-11-23 NOTE — TELEPHONE ENCOUNTER
Refill passed per [facility] protocol.     Requested Prescriptions   Pending Prescriptions Disp Refills    ROSUVASTATIN 40 MG Oral Tab [Pharmacy Med Name: ROSUVASTATIN 40MG TABLETS] 90 tablet 3     Sig: TAKE 1 TABLET(40 MG) BY MOUTH DAILY       Cholesterol Medication Protocol Passed - 11/23/2024  8:20 AM        Passed - ALT < 80     Lab Results   Component Value Date    ALT 18 08/13/2024             Passed - ALT resulted within past year        Passed - Lipid panel within past 12 months     Lab Results   Component Value Date    CHOLEST 139 08/13/2024    TRIG 90 08/13/2024    HDL 56 08/13/2024    LDL 66 08/13/2024    VLDL 14 08/13/2024    NONHDLC 83 08/13/2024             Passed - In person appointment or virtual visit in the past 12 mos or appointment in next 3 mos     Recent Outpatient Visits              1 month ago Severe dementia with mood disturbance, unspecified dementia type (HCC)    [facility] [place]    Office Visit    2 months ago COVID-19 virus infection    [facility] Natalie Oates MD    Office Visit    3 months ago Essential hypertension    [facility] Naatlie Cabello MD    Office Visit    3 months ago Severe dementia with mood disturbance, unspecified dementia type (HCC)    [facility]    [facility]    6 months ago Onychomycosis    [facility] [place], Eric Hal, DPM    Office Visit

## 2024-11-26 RX ORDER — OLANZAPINE 2.5 MG/1
2.5 TABLET, FILM COATED ORAL NIGHTLY
Qty: 90 TABLET | Refills: 3 | Status: SHIPPED | OUTPATIENT
Start: 2024-11-26

## 2024-11-26 NOTE — TELEPHONE ENCOUNTER
Protocol Failed/ No Protocol    Requested Prescriptions   Pending Prescriptions Disp Refills    OLANZapine (ZYPREXA) 2.5 MG Oral Tab 90 tablet 1     Sig: Take 1 tablet (2.5 mg total) by mouth nightly.       There is no refill protocol information for this order            Recent Outpatient Visits              2 months ago Severe dementia with mood disturbance, unspecified dementia type ([facility])    [facility] [place]    Office Visit    2 months ago COVID-19 virus infection    [facility], Natalie Oates MD    Office Visit    3 months ago Essential hypertension    [facility], Natalie Cabello MD    Office Visit    3 months ago Severe dementia with mood disturbance, unspecified dementia type ([facility])    [facility], Adi Bean DO    Office Visit    6 months ago Onychomycosis    [facility] [address], [place] Nahum Good DPM    Office Visit

## 2025-01-07 ENCOUNTER — NURSE TRIAGE (OUTPATIENT)
Dept: INTERNAL MEDICINE CLINIC | Facility: CLINIC | Age: OVER 89
End: 2025-01-07

## 2025-01-07 RX ORDER — LOSARTAN POTASSIUM 25 MG/1
25 TABLET ORAL DAILY
Qty: 90 TABLET | Refills: 3 | Status: SHIPPED | OUTPATIENT
Start: 2025-01-07

## 2025-01-07 NOTE — TELEPHONE ENCOUNTER
Discussed with Ceci caregiver  Cough congestion   not coughing any phlegm no fever no shortness of breath  Mucinex  DM advised    monitor

## 2025-01-07 NOTE — TELEPHONE ENCOUNTER
Protocol Failed/ No Protocol    Requested Prescriptions   Pending Prescriptions Disp Refills    LOSARTAN 25 MG Oral Tab [Pharmacy Med Name: LOSARTAN 25MG TABLETS] 90 tablet 1     Sig: TAKE 1 TABLET(25 MG) BY MOUTH DAILY       Hypertension Medications Protocol Failed - 1/7/2025 11:26 AM        Failed - EGFRCR or GFRNAA > 50     GFR Evaluation  EGFRCR: 42 , resulted on 8/13/2024          Passed - CMP or BMP in past 12 months        Passed - Last BP reading less than 140/90     BP Readings from Last 1 Encounters:   09/11/24 130/80               Passed - In person appointment or virtual visit in the past 12 mos or appointment in next 3 mos     Recent Outpatient Visits              3 months ago Severe dementia with mood disturbance, unspecified dementia type (HCC)    [facility], Adi Bean DO    Office Visit    3 months ago COVID-19 virus infection    [facility], Natalie Oates MD    Office Visit    4 months ago Essential hypertension    [facility], Natalie Cabello MD    Office Visit    5 months ago Severe dementia with mood disturbance, unspecified dementia type (HCC)    [facility], Adi Bean DO    Office Visit    8 months ago Onychomycosis    [facility], [place], Eric Hal, DPM    Office Visit                             Recent Outpatient Visits              3 months ago Severe dementia with mood disturbance, unspecified dementia type (HCC)    [facility], Adi Bean DO    Office Visit    3 months ago COVID-19 virus infection    [facility], Natalie Oates MD    Office Visit    4 months ago Essential hypertension    [facility], Natalie Cabello MD    Office Visit    5 months  ago Severe dementia with mood disturbance, unspecified dementia type (HCC)    [facility] [place] Adi Bay,     Office Visit    8 months ago Onychomycosis    [facility], [place], Eric Hal, DPM    Office Visit

## 2025-01-07 NOTE — TELEPHONE ENCOUNTER
Action Requested: Summary for Provider     []  Critical Lab, Recommendations Needed  [x] Need Additional Advice  []   FYI    []   Need Orders  [] Need Medications Sent to Pharmacy  []  Other     SUMMARY: Office visit. Patient will only see Dr. Goetz. Okay to add patient to your schedule? First available is not until 1/14/25/    Reason for call: Cough  Onset: Data Unavailable    Per caregiver Ceci, patient was sick over the New Year. She had a fever over New Year but it was only for one day. Her vitals have been stable. She denies shortness of breath, chest pain, wheezing.  Currently she has a productive cough but she is unable to spit the phlegm out. Patient is taking Robitussin and Tylenol as needed. Ceci would like patient to be seen in the office to have her lungs checked.    Reason for Disposition   Patient wants to be seen    Protocols used: Cough-A-OH

## 2025-01-08 RX ORDER — LOSARTAN POTASSIUM 25 MG/1
25 TABLET ORAL DAILY
Qty: 90 TABLET | Refills: 1 | OUTPATIENT
Start: 2025-01-08

## 2025-01-23 ENCOUNTER — TELEPHONE (OUTPATIENT)
Dept: INTERNAL MEDICINE CLINIC | Facility: CLINIC | Age: OVER 89
End: 2025-01-23

## 2025-01-23 NOTE — TELEPHONE ENCOUNTER
Jennifer, would like to know if the doctor can give them orders for home nursing and home physical therapy.

## 2025-01-23 NOTE — TELEPHONE ENCOUNTER
I approve    Resume HH and physical therapy  I will sign the paper  I work  [employer]   off this afternoon and will be back next Tuesday

## 2025-01-23 NOTE — TELEPHONE ENCOUNTER
- continue prograf, cellcept and prednisone. Monitor labs daily and adjust dose accordingly for a therapeutic level.      Jennifer notified of below  She will fax orders to the office, provided fax number.   Asked when last visit was, advised of info. Patient may need to schedule - drew will have patient call us to schedule a visit.

## 2025-02-07 ENCOUNTER — TELEPHONE (OUTPATIENT)
Dept: INTERNAL MEDICINE CLINIC | Facility: CLINIC | Age: OVER 89
End: 2025-02-07

## 2025-02-07 NOTE — TELEPHONE ENCOUNTER
Jennifer from [facility] is following up on requested orders and referral needed for nurse home health services, most recent face to face notes visit for the patient.  It is mentioned the requested was faxed to Dr Goetz's office on 2/6/25    FAX   [phone]    Please see closed telephone encounter of 1/23/25

## 2025-02-11 NOTE — TELEPHONE ENCOUNTER
Order received from [facility] to be signed on 02/05/25, will have Dr Goetz sign and will fax along progress Notes . Referral order is also being requested to [facility] , pended please review and sign off if you agree . Fax # [phone] .

## 2025-02-18 ENCOUNTER — TELEPHONE (OUTPATIENT)
Dept: INTERNAL MEDICINE CLINIC | Facility: CLINIC | Age: OVER 89
End: 2025-02-18

## 2025-02-18 NOTE — TELEPHONE ENCOUNTER
Cesia from [facility] would like last office visit notes faxed to # [phone]. Please advise

## 2025-02-20 ENCOUNTER — OFFICE VISIT (OUTPATIENT)
Dept: INTERNAL MEDICINE CLINIC | Facility: CLINIC | Age: OVER 89
End: 2025-02-20

## 2025-02-20 ENCOUNTER — TELEPHONE (OUTPATIENT)
Dept: INTERNAL MEDICINE CLINIC | Facility: CLINIC | Age: OVER 89
End: 2025-02-20

## 2025-02-20 VITALS
WEIGHT: 187 LBS | HEART RATE: 91 BPM | SYSTOLIC BLOOD PRESSURE: 96 MMHG | HEIGHT: 70 IN | BODY MASS INDEX: 26.77 KG/M2 | DIASTOLIC BLOOD PRESSURE: 63 MMHG

## 2025-02-20 DIAGNOSIS — M47.816 LUMBAR SPONDYLOSIS: ICD-10-CM

## 2025-02-20 DIAGNOSIS — E78.5 HYPERLIPIDEMIA, UNSPECIFIED HYPERLIPIDEMIA TYPE: ICD-10-CM

## 2025-02-20 DIAGNOSIS — R26.9 GAIT ABNORMALITY: ICD-10-CM

## 2025-02-20 DIAGNOSIS — Z79.01 CURRENT USE OF LONG TERM ANTICOAGULATION: ICD-10-CM

## 2025-02-20 DIAGNOSIS — G89.29 CHRONIC BILATERAL LOW BACK PAIN WITHOUT SCIATICA: ICD-10-CM

## 2025-02-20 DIAGNOSIS — E55.9 VITAMIN D DEFICIENCY: ICD-10-CM

## 2025-02-20 DIAGNOSIS — F02.818 LATE ONSET ALZHEIMER'S DEMENTIA WITH OTHER BEHAVIORAL DISTURBANCE, UNSPECIFIED DEMENTIA SEVERITY (HCC): ICD-10-CM

## 2025-02-20 DIAGNOSIS — I48.21 PERMANENT ATRIAL FIBRILLATION (HCC): ICD-10-CM

## 2025-02-20 DIAGNOSIS — R53.1 WEAKNESS GENERALIZED: ICD-10-CM

## 2025-02-20 DIAGNOSIS — Z86.711 HISTORY OF PULMONARY EMBOLISM: ICD-10-CM

## 2025-02-20 DIAGNOSIS — I10 ESSENTIAL HYPERTENSION: ICD-10-CM

## 2025-02-20 DIAGNOSIS — G30.1 LATE ONSET ALZHEIMER'S DEMENTIA WITH OTHER BEHAVIORAL DISTURBANCE, UNSPECIFIED DEMENTIA SEVERITY (HCC): ICD-10-CM

## 2025-02-20 DIAGNOSIS — M54.50 CHRONIC BILATERAL LOW BACK PAIN WITHOUT SCIATICA: ICD-10-CM

## 2025-02-20 DIAGNOSIS — H35.30 ARMD (AGE-RELATED MACULAR DEGENERATION), BILATERAL: ICD-10-CM

## 2025-02-20 DIAGNOSIS — Z00.00 MEDICARE ANNUAL WELLNESS VISIT, SUBSEQUENT: Primary | ICD-10-CM

## 2025-02-20 DIAGNOSIS — Z86.718 HISTORY OF DVT OF LOWER EXTREMITY: ICD-10-CM

## 2025-02-20 PROBLEM — N81.89 PELVIC FLOOR WEAKNESS: Status: RESOLVED | Noted: 2023-05-03 | Resolved: 2025-01-01

## 2025-02-20 PROBLEM — N81.89 PELVIC FLOOR WEAKNESS: Status: RESOLVED | Noted: 2023-05-03 | Resolved: 2025-02-20

## 2025-02-20 NOTE — PROGRESS NOTES
Subjective:   Linda Cleveland is a 89 year old female who presents for a Subsequent Annual Wellness visit (Pt already had Initial Annual Wellness) and scheduled follow up of multiple significant but stable problems.   Face to face encounter  Home health ordered   patient with generalized weakness deconditioning gait and balance abnormality  able to walk with assistance will benefit from OT and physical therapy  It is extremely difficult to transport patient out of her home  She has 24 hour caregiver    Patient dementia  She is cooperative  has sundowning and trouble sleeping  appropriate during the day  need help with most ADL.s    Chronic  Atrial fib  hx of Pulmonary embolus and DVT  on chronic anticoagulation                                                                                                                                                                                                                                                                                                                                                                                                                                                                                                                                                                                                                                                                                                                                                                                                                                                                                                                                                                                                                                                                                                                                                                                                                                                                                               History/Other:   Fall Risk Assessment:   She has been screened for Falls and is High Risk. Fall Prevention information provided to patient in After Visit Summary.    Do you feel unsteady when standing or walking?: Yes  Do you worry about falling?: Yes  Have you fallen in the past year?: No     Cognitive Assessment:   Abnormal  What day of the week is this?: Incorrect  What month is it?: Incorrect  What year is it?: Incorrect  Recall \"Ball\": Incorrect  Recall \"Flag\": Incorrect  Recall \"Tree\": Incorrect      Functional Ability/Status:   Linda Cleveland has some abnormal functions as listed below:  She has Dressing and/or Bathing issues based on screening of functional status.  Difficulty dressing or bathing?: Yes  Bathing or Showering: Cannot do without help  Dressing: Cannot do without help  She has Toileting difficulties based on screening of functional status.She has Eating difficulties based on screening of functional status.She has Driving difficulties based on screening of functional status. She has Meal Preparation difficulties based on screening of functional status.She has difficulties Managing Money/Bills based on screening of functional status.She has difficulties Shopping for Groceries based on screening of functional status. She has difficulties Taking Meds as Rx'd based on screening of functional status. She has Hearing problems based on screening of functional status.She has Vision problems based on screening of functional status. She has Walking problems based on screening of functional status. She has problems with Daily Activities based on screening of functional status. She has problems with Memory based on screening of functional status.       Depression Screening (PHQ):  PHQ-2 SCORE: 0  , done 2/20/2025            Advanced Directives:   She has a Living Will on file in [facility]; reviewed and discussed documents with patient (and family/surrogate if present).  She has a Power of   for Health Care on file in [facility].  Patient has Advance Care Planning documents present in EMR. Reviewed documents with patient (and family/surrogate if present).      Patient Active Problem List   Diagnosis    Essential hypertension    Hyperlipidemia    Permanent atrial fibrillation    ARMD (age-related macular degeneration), bilateral    Current use of long term anticoagulation    Chronic bilateral low back pain without sciatica    Lumbar spondylosis    Weakness generalized    History of DVT of lower extremity    History of pulmonary embolism     Allergies:  She has No Known Allergies.    Current Medications:  Outpatient Medications Marked as Taking for the 2/20/25 encounter (Office Visit) with Natalie Goetz MD   Medication Sig    DULoxetine (CYMBALTA) 30 MG Oral Cap DR Particles Take 1 capsule (30 mg total) by mouth daily.    losartan 25 MG Oral Tab Take 1 tablet (25 mg total) by mouth daily.    OLANZapine (ZYPREXA) 2.5 MG Oral Tab Take 1 tablet (2.5 mg total) by mouth nightly.    rosuvastatin 40 MG Oral Tab Take 1 tablet (40 mg total) by mouth daily.    rivaroxaban (XARELTO) 20 MG Oral Tab Take 1 tablet (20 mg total) by mouth nightly.    Acetaminophen (TYLENOL OR)     dilTIAZem HCl ER Coated Beads 180 MG Oral Capsule SR 24 Hr Take 1 capsule (180 mg total) by mouth daily.    levothyroxine 50 MCG Oral Tab Take 1 tablet (50 mcg total) by mouth before breakfast.       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  Surgical History:  She  has a past surgical history that includes Yag Capsulotomy - OU - Both Eyes (2007, 2008); Cataract extraction w/  intraocular lens implant (Right, 02/09/2005); and Cataract extraction w/  intraocular lens implant (Left, 03/02/2005).   Family History:  Her family history includes Varicose veins in her father and mother.  Social History:  She  reports that she has never smoked. She has never been exposed to tobacco smoke. She has never used smokeless tobacco. She reports that she does not drink alcohol and does not use drugs.    Tobacco:  She has never smoked tobacco.    CAGE Alcohol Screen:   CAGE screening score of 0 on 2/20/2025, showing low risk of alcohol abuse.      Patient Care Team:  Natalie Goetz MD as PCP - General (Internal Medicine)  Ezekiel Gay MD (Cardiovascular Diseases)    Review of Systems   Unable to perform ROS: Dementia (Historian caregiver Ceci)   Respiratory:  Negative for cough.    Cardiovascular:  Negative for chest pain.   Gastrointestinal:  Negative for abdominal pain.   Genitourinary:         Incontinent of urine   funcitonal   Musculoskeletal:  Positive for gait problem.   Neurological:  Positive for weakness.   Psychiatric/Behavioral:  Positive for confusion (especially  sundowning) and sleep disturbance.          Objective:   Physical Exam  Constitutional:       Appearance: She is well-developed. She is not ill-appearing.      Comments: Exam limited  Wheelchair bound  Pt with psychomotor retardation  soft spoken   Forgetful  cooperative    HENT:      Right Ear: Ear canal normal.      Left Ear: Ear canal normal.      Mouth/Throat:      Pharynx: Oropharynx is clear.   Eyes:      Extraocular Movements: Extraocular movements intact.      Conjunctiva/sclera: Conjunctivae normal.      Pupils: Pupils are equal, round, and reactive to light.   Cardiovascular:      Rate and Rhythm: Normal rate. Rhythm irregular.      Heart  sounds: Normal heart sounds.   Pulmonary:      Effort: Pulmonary effort is normal.      Breath sounds: Normal breath sounds.   Abdominal:      General: Bowel sounds are normal.      Palpations: Abdomen is soft.   Skin:     General: Skin is warm and dry.   Neurological:      Mental Status: She is alert. She is disoriented.      Motor: Weakness present.      Gait: Gait abnormal.      Comments: Pleasantly confused   Psychiatric:         Mood and Affect: Mood normal.         Behavior: Behavior normal.           BP 96/63 (BP Location: Right arm, Patient Position: Sitting, Cuff Size: adult)   Pulse 91   Ht 5' 10\" (1.778 m)   Wt 187 lb (84.8 kg)   BMI 26.83 kg/m²  Estimated body mass index is 26.83 kg/m² as calculated from the following:    Height as of this encounter: 5' 10\" (1.778 m).    Weight as of this encounter: 187 lb (84.8 kg).    Medicare Hearing Assessment:   Hearing Screening    Entry User: Kristen Espinoza CMA  Screening Method: Questionnaire  I have a problem hearing over the telephone: Yes I have trouble following the conversations when two or more people are talking at the same time: Yes   I have trouble understanding things on the TV: Yes I have to strain to understand conversations: Yes   I have to worry about missing the telephone ring or doorbell: Yes I have trouble hearing conversations in a noisy background such as a crowded room or restaurant: Yes   I get confused about where sounds come from: Yes I misunderstand some words in a sentence and need to ask people to repeat themselves: Yes   I especially have trouble understanding the speech of women and children: Yes I have trouble understanding the speaker in a large room such as at a meeting or place of [employer]: Yes   Many people I talk to seem to mumble (or don't speak clearly): Yes People get annoyed because I misunderstand what they say: Yes   I misunderstand what others are saying and make inappropriate responses: Yes I avoid social activities  because I cannot hear well and fear I will reply improperly: Yes   Family members and friends have told me they think I may have hearing loss: Yes                 Assessment & Plan:   Linda Cleveland is a 89 year old female who presents for a Medicare Assessment.   (Z00.00) Medicare annual wellness visit, subsequent  (primary encounter diagnosis)  Plan: Patient is dependent with almost all ADL.s    Health screening   routine eye exam advised.  Home Health ordered    [facility]    Face to Face Encounter Note    Linda Cleveland Patient Status:  Specimen    1934 MRN [id number]   Location [facility] Attending No att. providers found   Hosp Day # 0 PCP MD OCTAVIO Henriquez, or a non-physician practitioner working with me, or an inpatient physician had a face-to-face encounter with this patient during which a medical condition was addressed which is the primary reason for home health care on: 25 (date of face-to-face encounter).    1.  Please provide a brief narrative describing the patient's medical condition that supports the need for home care services:  (Per CMS, a diagnosis is necessary but not sufficient):  Generalized progressive weakness  Gait and balance abnormaltiy  Deconditioning  Needing help with transfers  Hx of  low back pain  controlled with cymbalta  Hx of CVA   Hx of pulmonary embolus and DVT  Dementia   Chronic atrial fibrillation and hypertesnion    2.  Please provide a brief narrative describing home care services needed to treat the patient's medical condition:  (Please complete all that apply):    Skilled Nursing to:  monitor blood pressure    Physical/Occupational Therapy  for gait training  muscle strengthneing exercisese        3.  Please provide a brief narrative describing the patient's homebound status including all assistive devices being used, assistance needed from others, special  transportation required or medical contraindications for leaving the home.  Also describe the signs and symptoms and risk factors that make it a taxing effort for the patient to leave the home:  Pt mostly wheelchair bound  can transfer with assist  And walk with walker with assist    Certification of home health services  Based on the above findings, I certify that this patient is confined to the home and needs intermittent skilled nursing care, physical therapy and/or speech therapy or continues to need occupational therapy.  The patient is under my care, and I have initiated the establishment of the plan of care.  This physician will be followed by a physician who will periodically review the plan of care.  The findings from this face-to-face encounter have been communicated with the patient's community-based physician who will be assuming this patient's home health plan of care.    Date:  2/18/2025  Physician:  Natalie Goetz MD       (Z86.711) History of pulmonary embolism  Plan: no knonw recurrence    (Z86.718) History of DVT of lower extremity  Plan: no knonw recurrence    (Z79.01) Current use of long term anticoagulation  Plan: bleeding precaution  Fall precauiton    (I10) Essential hypertension  Plan: BP 96/63 (BP Location: Right arm, Patient Position: Sitting, Cuff Size: adult)   Pulse 91   Ht 5' 10\" (1.778 m)   Wt 187 lb (84.8 kg)   BMI 26.83 kg/m²   Monitor home BP    (G30.1,  F02.818) Late onset Alzheimer's dementia with other behavioral disturbance, unspecified dementia severity (HCC)  Plan: pt seen by psychiatry  Planned neuropsychological testing  will wait for result    (R26.9) Gait abnormality  Plan: fall preucaiton  OT PT ordered    (E78.5) Hyperlipidemia, unspecified hyperlipidemia type  Plan: CBC With Differential With Platelet, Comp         Metabolic Panel (14), Lipid Panel, TSH and Free        T4        Low cholesterol diet advised  Avoid saturated and trans fats       (E55.9) Vitamin D  deficiency  Plan: Vitamin D        supplement    (I48.21) Permanent atrial fibrillation  Plan: EKG 12 Lead        Chronic stable followed by cardiology  DR [facility]    (M47.816) Lumbar spondylosis  Plan: pain controlled  Cont cymbalta    (H35.30) ARMD (age-related macular degeneration), bilateral  Plan: ongoing follow up with ophtalmology    (M54.50,  G89.29) Chronic bilateral low back pain without sciatica  Plan: pain controlled    (R53.1) Weakness generalized  Plan: PT  OT ordered            The patient indicates understanding of these issues and agrees to the plan.  Lab work ordered.  Reinforced healthy diet, lifestyle, and exercise.      No follow-ups on file.     Natalie Goetz MD, 2/20/2025     Supplementary Documentation:   General Health:  In the past six months, have you lost more than 10 pounds without trying?: 2 - No  Has your appetite been poor?: No  Type of Diet: Balanced  How does the patient maintain a good energy level?: Appropriate Exercise;Stretching  How would you describe your daily physical activity?: Light  How would you describe your current health state?: Fair  How do you maintain positive mental well-being?: Social Interaction  On a scale of 0 to 10, with 0 being no pain and 10 being severe pain, what is your pain level?: 0 - (None)  In the past six months, have you experienced urine leakage?: 1-Yes  At any time do you feel concerned for the safety/well-being of yourself and/or your children, in your home or elsewhere?: No  Have you had any immunizations at another office such as Influenza, Hepatitis B, Tetanus, or Pneumococcal?: No    Health Maintenance   Topic Date Due    Annual Physical  Never done    Zoster Vaccines (1 of 2) Never done    COVID-19 Vaccine (6 - 2024-25 season) 09/01/2024    Influenza Vaccine (1) 10/01/2024    Annual Depression Screening  01/01/2025    Fall Risk Screening (Annual)  01/01/2025    Pneumococcal Vaccine: 50+ Years  Completed    Meningococcal B Vaccine   Aged Out

## 2025-02-21 NOTE — TELEPHONE ENCOUNTER
Fax progress notes from 2/18  Cesia from [facility] would like last office visit notes faxed to # [phone]. Please advise          Face to face encounter    Home health ordered  Cesia from [facility] would like last office visit notes faxed to # [phone]. Please advise      Sched RN visit for prenar 20 and flu shot    Pt to get blood worik done  non fasting  EKG ordered as well    Ok to speak with Ceci caregiver  Pt with dementia

## 2025-02-21 NOTE — TELEPHONE ENCOUNTER
Call made in attempt to relay message to pt regarding blood work and EKG per MD message below no answer - left vm

## 2025-02-24 ENCOUNTER — NURSE ONLY (OUTPATIENT)
Dept: INTERNAL MEDICINE CLINIC | Facility: CLINIC | Age: OVER 89
End: 2025-02-24

## 2025-02-24 ENCOUNTER — EKG ENCOUNTER (OUTPATIENT)
Dept: LAB | Age: OVER 89
End: 2025-02-24
Attending: INTERNAL MEDICINE
Payer: MEDICARE

## 2025-02-24 ENCOUNTER — LAB ENCOUNTER (OUTPATIENT)
Dept: LAB | Age: OVER 89
End: 2025-02-24
Attending: INTERNAL MEDICINE
Payer: MEDICARE

## 2025-02-24 DIAGNOSIS — I48.21 PERMANENT ATRIAL FIBRILLATION (HCC): ICD-10-CM

## 2025-02-24 DIAGNOSIS — E78.5 HYPERLIPIDEMIA, UNSPECIFIED HYPERLIPIDEMIA TYPE: ICD-10-CM

## 2025-02-24 DIAGNOSIS — E55.9 VITAMIN D DEFICIENCY: ICD-10-CM

## 2025-02-24 DIAGNOSIS — Z23 NEED FOR VACCINATION: Primary | ICD-10-CM

## 2025-02-24 LAB
ALBUMIN SERPL-MCNC: 3.8 G/DL (ref 3.2–4.8)
ALBUMIN/GLOB SERPL: 1.5 {RATIO} (ref 1–2)
ALP LIVER SERPL-CCNC: 113 U/L
ALT SERPL-CCNC: 40 U/L
ANION GAP SERPL CALC-SCNC: 8 MMOL/L (ref 0–18)
AST SERPL-CCNC: 38 U/L (ref ?–34)
BASOPHILS # BLD AUTO: 0 X10(3) UL (ref 0–0.2)
BASOPHILS NFR BLD AUTO: 0 %
BILIRUB SERPL-MCNC: 0.6 MG/DL (ref 0.2–0.9)
BUN BLD-MCNC: 11 MG/DL (ref 9–23)
BUN/CREAT SERPL: 10 (ref 10–20)
CALCIUM BLD-MCNC: 9 MG/DL (ref 8.7–10.4)
CHLORIDE SERPL-SCNC: 105 MMOL/L (ref 98–112)
CHOLEST SERPL-MCNC: 109 MG/DL (ref ?–200)
CO2 SERPL-SCNC: 28 MMOL/L (ref 21–32)
CREAT BLD-MCNC: 1.1 MG/DL
DEPRECATED RDW RBC AUTO: 52.3 FL (ref 35.1–46.3)
EGFRCR SERPLBLD CKD-EPI 2021: 48 ML/MIN/1.73M2 (ref 60–?)
EOSINOPHIL # BLD AUTO: 0.21 X10(3) UL (ref 0–0.7)
EOSINOPHIL NFR BLD AUTO: 3.3 %
ERYTHROCYTE [DISTWIDTH] IN BLOOD BY AUTOMATED COUNT: 14.6 % (ref 11–15)
FASTING PATIENT LIPID ANSWER: NO
FASTING STATUS PATIENT QL REPORTED: NO
GLOBULIN PLAS-MCNC: 2.6 G/DL (ref 2–3.5)
GLUCOSE BLD-MCNC: 149 MG/DL (ref 70–99)
HCT VFR BLD AUTO: 42.2 %
HDLC SERPL-MCNC: 41 MG/DL (ref 40–59)
HGB BLD-MCNC: 14.2 G/DL
IMM GRANULOCYTES # BLD AUTO: 0.02 X10(3) UL (ref 0–1)
IMM GRANULOCYTES NFR BLD: 0.3 %
LDLC SERPL CALC-MCNC: 50 MG/DL (ref ?–100)
LYMPHOCYTES # BLD AUTO: 1.19 X10(3) UL (ref 1–4)
LYMPHOCYTES NFR BLD AUTO: 18.6 %
MCH RBC QN AUTO: 33 PG (ref 26–34)
MCHC RBC AUTO-ENTMCNC: 33.6 G/DL (ref 31–37)
MCV RBC AUTO: 98.1 FL
MONOCYTES # BLD AUTO: 0.59 X10(3) UL (ref 0.1–1)
MONOCYTES NFR BLD AUTO: 9.2 %
NEUTROPHILS # BLD AUTO: 4.38 X10 (3) UL (ref 1.5–7.7)
NEUTROPHILS # BLD AUTO: 4.38 X10(3) UL (ref 1.5–7.7)
NEUTROPHILS NFR BLD AUTO: 68.6 %
NONHDLC SERPL-MCNC: 68 MG/DL (ref ?–130)
OSMOLALITY SERPL CALC.SUM OF ELEC: 294 MOSM/KG (ref 275–295)
PLATELET # BLD AUTO: 238 10(3)UL (ref 150–450)
POTASSIUM SERPL-SCNC: 3.7 MMOL/L (ref 3.5–5.1)
PROT SERPL-MCNC: 6.4 G/DL (ref 5.7–8.2)
Q-T INTERVAL: 432 MS
QRS DURATION: 76 MS
QTC CALCULATION (BEZET): 434 MS
R AXIS: -29 DEGREES
RBC # BLD AUTO: 4.3 X10(6)UL
SODIUM SERPL-SCNC: 141 MMOL/L (ref 136–145)
T AXIS: 61 DEGREES
T4 FREE SERPL-MCNC: 1.2 NG/DL (ref 0.8–1.7)
TRIGL SERPL-MCNC: 92 MG/DL (ref 30–149)
TSI SER-ACNC: 2.3 UIU/ML (ref 0.55–4.78)
VENTRICULAR RATE: 61 BPM
VIT D+METAB SERPL-MCNC: 50.5 NG/ML (ref 30–100)
VLDLC SERPL CALC-MCNC: 13 MG/DL (ref 0–30)
WBC # BLD AUTO: 6.4 X10(3) UL (ref 4–11)

## 2025-02-24 PROCEDURE — 90677 PCV20 VACCINE IM: CPT | Performed by: INTERNAL MEDICINE

## 2025-02-24 PROCEDURE — 93005 ELECTROCARDIOGRAM TRACING: CPT

## 2025-02-24 PROCEDURE — 80053 COMPREHEN METABOLIC PANEL: CPT

## 2025-02-24 PROCEDURE — 85025 COMPLETE CBC W/AUTO DIFF WBC: CPT

## 2025-02-24 PROCEDURE — 90662 IIV NO PRSV INCREASED AG IM: CPT | Performed by: INTERNAL MEDICINE

## 2025-02-24 PROCEDURE — G0008 ADMIN INFLUENZA VIRUS VAC: HCPCS | Performed by: INTERNAL MEDICINE

## 2025-02-24 PROCEDURE — 82306 VITAMIN D 25 HYDROXY: CPT

## 2025-02-24 PROCEDURE — 84443 ASSAY THYROID STIM HORMONE: CPT

## 2025-02-24 PROCEDURE — 36415 COLL VENOUS BLD VENIPUNCTURE: CPT

## 2025-02-24 PROCEDURE — 93010 ELECTROCARDIOGRAM REPORT: CPT | Performed by: INTERNAL MEDICINE

## 2025-02-24 PROCEDURE — G0009 ADMIN PNEUMOCOCCAL VACCINE: HCPCS | Performed by: INTERNAL MEDICINE

## 2025-02-24 PROCEDURE — 80061 LIPID PANEL: CPT

## 2025-02-24 PROCEDURE — 84439 ASSAY OF FREE THYROXINE: CPT

## 2025-02-27 ENCOUNTER — TELEPHONE (OUTPATIENT)
Dept: INTERNAL MEDICINE CLINIC | Facility: CLINIC | Age: OVER 89
End: 2025-02-27

## 2025-03-12 ENCOUNTER — MED REC SCAN ONLY (OUTPATIENT)
Dept: INTERNAL MEDICINE CLINIC | Facility: CLINIC | Age: OVER 89
End: 2025-03-12

## 2025-03-18 ENCOUNTER — TELEPHONE (OUTPATIENT)
Dept: CARDIOLOGY | Age: OVER 89
End: 2025-03-18

## 2025-03-18 DIAGNOSIS — E78.2 HYPERLIPIDEMIA, MIXED: Primary | ICD-10-CM

## 2025-03-18 DIAGNOSIS — Z86.711 HISTORY OF PULMONARY EMBOLISM: ICD-10-CM

## 2025-03-18 DIAGNOSIS — I48.20 CHRONIC ATRIAL FIBRILLATION  (CMD): ICD-10-CM

## 2025-03-18 NOTE — TELEPHONE ENCOUNTER
Current Outpatient Medications:     dilTIAZem HCl ER Coated Beads 180 MG Oral Capsule SR 24 Hr, Take 1 capsule (180 mg total) by mouth daily., Disp: 90 capsule, Rfl: 1

## 2025-03-20 RX ORDER — LOSARTAN POTASSIUM 25 MG/1
25 TABLET ORAL DAILY
Qty: 90 TABLET | Refills: 0 | Status: SHIPPED | OUTPATIENT
Start: 2025-03-20

## 2025-03-25 RX ORDER — DILTIAZEM HYDROCHLORIDE 180 MG/1
180 CAPSULE, COATED, EXTENDED RELEASE ORAL DAILY
Qty: 90 CAPSULE | Refills: 3 | Status: SHIPPED | OUTPATIENT
Start: 2025-03-25

## 2025-04-01 PROBLEM — I26.99 ACUTE PULMONARY EMBOLISM WITHOUT ACUTE COR PULMONALE  (CMD): Status: RESOLVED | Noted: 2023-06-26 | Resolved: 2025-04-01

## 2025-04-01 PROBLEM — I82.403 ACUTE DEEP VEIN THROMBOSIS (DVT) OF BOTH LOWER EXTREMITIES  (CMD): Status: RESOLVED | Noted: 2023-06-26 | Resolved: 2025-04-01

## 2025-04-02 ENCOUNTER — TELEPHONE (OUTPATIENT)
Dept: INTERNAL MEDICINE CLINIC | Facility: CLINIC | Age: OVER 89
End: 2025-04-02

## 2025-04-02 NOTE — TELEPHONE ENCOUNTER
Dr. Goetz, please advise what patient can use for rash on face and forehead. Please see Kareo message and attached picture copied below.     JOSUE Granados with [facility] states patient's caregiver Ceci called and sent a picture to her  of a new rash on patient's cheek and forehead. It is scattered red flat dots.  Caregiver has been putting Viola cream on the rash. JOSUE Granados told caregiver to not put anything on the face until  advises.    Spoke to Ceci , the caregiver and she will send a picture on [employer].    JOSUE Granados states patient has sensitive skin,  Patient had similar rash on her legs yesterday in which Lubriderm cream was applied and rash is gone today.     Desitin cream has been applied in diaper area for redness on buttocks, now gone.         Helirma Goetz,     I noticed rashes on Linda face. I did not apply any face cream this morning.  Same food she ate .  Please advise.   Attachments   [id number]

## 2025-04-08 ENCOUNTER — APPOINTMENT (OUTPATIENT)
Dept: CARDIOLOGY | Age: OVER 89
End: 2025-04-08

## 2025-04-08 ENCOUNTER — LAB SERVICES (OUTPATIENT)
Dept: LAB | Age: OVER 89
End: 2025-04-08

## 2025-04-08 VITALS
SYSTOLIC BLOOD PRESSURE: 96 MMHG | HEART RATE: 73 BPM | BODY MASS INDEX: 24.54 KG/M2 | HEIGHT: 68 IN | DIASTOLIC BLOOD PRESSURE: 64 MMHG | OXYGEN SATURATION: 96 %

## 2025-04-08 DIAGNOSIS — I48.20 CHRONIC ATRIAL FIBRILLATION  (CMD): ICD-10-CM

## 2025-04-08 DIAGNOSIS — E78.2 HYPERLIPIDEMIA, MIXED: ICD-10-CM

## 2025-04-08 DIAGNOSIS — Z79.01 LONG TERM CURRENT USE OF ANTICOAGULANT: ICD-10-CM

## 2025-04-08 DIAGNOSIS — R53.83 OTHER FATIGUE: ICD-10-CM

## 2025-04-08 DIAGNOSIS — E03.9 ACQUIRED HYPOTHYROIDISM: ICD-10-CM

## 2025-04-08 DIAGNOSIS — I48.20 CHRONIC ATRIAL FIBRILLATION  (CMD): Primary | ICD-10-CM

## 2025-04-08 DIAGNOSIS — Z86.718 HISTORY OF DVT (DEEP VEIN THROMBOSIS): ICD-10-CM

## 2025-04-08 DIAGNOSIS — Z91.81 PERSONAL HISTORY OF FALL: ICD-10-CM

## 2025-04-08 DIAGNOSIS — Z86.711 HISTORY OF PULMONARY EMBOLISM: ICD-10-CM

## 2025-04-08 LAB
ALBUMIN SERPL-MCNC: 2.8 G/DL (ref 3.4–5)
ALBUMIN/GLOB SERPL: 0.8 {RATIO} (ref 1–2.4)
ALP SERPL-CCNC: 108 UNITS/L (ref 45–117)
ALT SERPL-CCNC: 17 UNITS/L
ANION GAP SERPL CALC-SCNC: 12 MMOL/L (ref 7–19)
AST SERPL-CCNC: 16 UNITS/L
BASOPHILS # BLD: 0 K/MCL (ref 0–0.3)
BASOPHILS NFR BLD: 0 %
BILIRUB SERPL-MCNC: 0.6 MG/DL (ref 0.2–1)
BUN SERPL-MCNC: 12 MG/DL (ref 6–20)
BUN/CREAT SERPL: 15 (ref 7–25)
CALCIUM SERPL-MCNC: 8.8 MG/DL (ref 8.4–10.2)
CHLORIDE SERPL-SCNC: 110 MMOL/L (ref 97–110)
CHOLEST SERPL-MCNC: 100 MG/DL
CHOLEST/HDLC SERPL: 2.1 {RATIO}
CO2 SERPL-SCNC: 23 MMOL/L (ref 21–32)
CREAT SERPL-MCNC: 0.81 MG/DL (ref 0.51–0.95)
DEPRECATED RDW RBC: 55.7 FL (ref 39–50)
EGFRCR SERPLBLD CKD-EPI 2021: 69 ML/MIN/{1.73_M2}
EOSINOPHIL # BLD: 0.1 K/MCL (ref 0–0.5)
EOSINOPHIL NFR BLD: 2 %
ERYTHROCYTE [DISTWIDTH] IN BLOOD: 15 % (ref 11–15)
FASTING DURATION TIME PATIENT: ABNORMAL H
GLOBULIN SER-MCNC: 3.3 G/DL (ref 2–4)
GLUCOSE SERPL-MCNC: 240 MG/DL (ref 70–99)
HCT VFR BLD CALC: 40.2 % (ref 36–46.5)
HDLC SERPL-MCNC: 48 MG/DL
HGB BLD-MCNC: 12.7 G/DL (ref 12–15.5)
IMM GRANULOCYTES # BLD AUTO: 0 K/MCL (ref 0–0.2)
IMM GRANULOCYTES # BLD: 0 %
LDLC SERPL CALC-MCNC: 39 MG/DL
LYMPHOCYTES # BLD: 1 K/MCL (ref 1–4)
LYMPHOCYTES NFR BLD: 23 %
MCH RBC QN AUTO: 32 PG (ref 26–34)
MCHC RBC AUTO-ENTMCNC: 31.6 G/DL (ref 32–36.5)
MCV RBC AUTO: 101.3 FL (ref 78–100)
MONOCYTES # BLD: 0.3 K/MCL (ref 0.3–0.9)
MONOCYTES NFR BLD: 7 %
NEUTROPHILS # BLD: 2.9 K/MCL (ref 1.8–7.7)
NEUTROPHILS NFR BLD: 68 %
NONHDLC SERPL-MCNC: 52 MG/DL
NRBC BLD MANUAL-RTO: 0 /100 WBC
PLATELET # BLD AUTO: 276 K/MCL (ref 140–450)
POTASSIUM SERPL-SCNC: 3.9 MMOL/L (ref 3.4–5.1)
PROT SERPL-MCNC: 6.1 G/DL (ref 6.4–8.2)
RBC # BLD: 3.97 MIL/MCL (ref 4–5.2)
SODIUM SERPL-SCNC: 141 MMOL/L (ref 135–145)
TRIGL SERPL-MCNC: 65 MG/DL
TSH SERPL-ACNC: 2.54 MCUNITS/ML (ref 0.35–5)
WBC # BLD: 4.3 K/MCL (ref 4.2–11)

## 2025-04-08 PROCEDURE — 85025 COMPLETE CBC W/AUTO DIFF WBC: CPT | Performed by: CLINICAL MEDICAL LABORATORY

## 2025-04-08 PROCEDURE — 80053 COMPREHEN METABOLIC PANEL: CPT | Performed by: CLINICAL MEDICAL LABORATORY

## 2025-04-08 PROCEDURE — 84443 ASSAY THYROID STIM HORMONE: CPT | Performed by: CLINICAL MEDICAL LABORATORY

## 2025-04-08 PROCEDURE — 99213 OFFICE O/P EST LOW 20 MIN: CPT | Performed by: INTERNAL MEDICINE

## 2025-04-08 PROCEDURE — 36415 COLL VENOUS BLD VENIPUNCTURE: CPT | Performed by: CLINICAL MEDICAL LABORATORY

## 2025-04-08 PROCEDURE — 80061 LIPID PANEL: CPT | Performed by: CLINICAL MEDICAL LABORATORY

## 2025-04-08 RX ORDER — OLANZAPINE 5 MG/1
5 TABLET ORAL NIGHTLY
COMMUNITY
Start: 2025-03-13

## 2025-04-08 ASSESSMENT — ENCOUNTER SYMPTOMS
ALLERGIC/IMMUNOLOGIC COMMENTS: NO NEW FOOD ALLERGIES
SHORTNESS OF BREATH: 0
HEADACHES: 0
WEAKNESS: 1
SUSPICIOUS LESIONS: 0
FEVER: 0
SYNCOPE: 0
COUGH: 0
LOSS OF BALANCE: 0
BRUISES/BLEEDS EASILY: 0
ALTERED MENTAL STATUS: 0
HEMOPTYSIS: 0
HEMATOCHEZIA: 0
CHILLS: 0
DOUBLE VISION: 0
WEIGHT LOSS: 0
WEIGHT GAIN: 0

## 2025-04-09 ENCOUNTER — TELEPHONE (OUTPATIENT)
Dept: CARDIOLOGY | Age: OVER 89
End: 2025-04-09

## 2025-04-09 ENCOUNTER — MED REC SCAN ONLY (OUTPATIENT)
Dept: INTERNAL MEDICINE CLINIC | Facility: CLINIC | Age: OVER 89
End: 2025-04-09

## 2025-04-28 NOTE — TELEPHONE ENCOUNTER
[address] nurse is requesting the Home Health orders to be signed and faxed back    DOS 8-17     Fax [phone] [employer]

## 2025-06-06 ENCOUNTER — TELEPHONE (OUTPATIENT)
Dept: INTERNAL MEDICINE CLINIC | Facility: CLINIC | Age: OVER 89
End: 2025-06-06

## 2025-06-06 NOTE — TELEPHONE ENCOUNTER
Jennifer called from [facility] asking that the patients recent office visit notes are faxed to the following fax#:    Fax#: [phone]

## 2025-06-11 RX ORDER — LEVOTHYROXINE SODIUM 50 UG/1
50 TABLET ORAL
Qty: 90 TABLET | Refills: 3 | OUTPATIENT
Start: 2025-06-11

## 2025-06-11 RX ORDER — LEVOTHYROXINE SODIUM 50 UG/1
50 TABLET ORAL
Qty: 90 TABLET | Refills: 3 | Status: SHIPPED | OUTPATIENT
Start: 2025-06-11

## 2025-06-13 ENCOUNTER — TELEPHONE (OUTPATIENT)
Dept: INTERNAL MEDICINE CLINIC | Facility: CLINIC | Age: OVER 89
End: 2025-06-13

## 2025-06-13 NOTE — TELEPHONE ENCOUNTER
Dr Goetz,    ADAM...    I received call from Roberta [facility]  She stated she is going to recertify continuation of care at this time  Patients POA had told Roberta that he was going to discuss Hospice care with you, but I do not see it mentioned in any recent note  Roberta stated patient has become weaker and she cannot even really walk orstand by herself any longer  Roberta will also continue physical and occupation therapy for patient

## 2025-06-14 NOTE — TELEPHONE ENCOUNTER
Noted  I spoke with POA    Mr Jose Cleveland  Not possible to bring pt in  We will meet within the next few days   to fill out form and discuss hospice

## 2025-06-27 ENCOUNTER — TELEPHONE (OUTPATIENT)
Dept: INTERNAL MEDICINE CLINIC | Facility: CLINIC | Age: OVER 89
End: 2025-06-27

## 2025-06-27 NOTE — TELEPHONE ENCOUNTER
Onsite Staff please be on the look out for the form. Per  home it needs to reason of death.  Per  home patient passed away on 2025 at home.

## 2025-07-31 ENCOUNTER — MED REC SCAN ONLY (OUTPATIENT)
Dept: INTERNAL MEDICINE CLINIC | Facility: CLINIC | Age: OVER 89
End: 2025-07-31

## 2026-04-09 ENCOUNTER — APPOINTMENT (OUTPATIENT)
Dept: CARDIOLOGY | Age: OVER 89
End: 2026-04-09

## (undated) NOTE — LETTER
23        Attn: Chivo Mclean  :  1934    Dr. Lorena Squires would like to perform a procedure on this patient. Patient's PCP is requesting clearance for patient to hold blood thinner. Patient is currently scheduled for 3/2/2023. []  Patient has been cleared to hold Xarelto for 2 days prior to Bilateral L3-4, L4-5 and L5-S1 facet injections . Holding the medication(s) may put the patient at an increased risk for stroke, heart attack, or neurologic or cardiac events. []  Patient has not been cleared to hold Xarelto for procedure. Please make a selection and fax back. If this office may be of further assistance, please do not hesitate to contact us.       Sincerely,    Virginie Monroy MD    [phone]  [phone]

## (undated) NOTE — LETTER
December 28, 2018    Daisy Sims MD  [address] [facility]     Patient: Caden Davis   YOB: 1934   Date of Visit: 12/28/2018       Dear Dr. Benjy Ferris MD:    Thank you for referring Caden Davis to me for evaluat Social History: Social History    Tobacco Use      Smoking status: Never Smoker      Smokeless tobacco: Never Used    Alcohol use: No      Alcohol/week: 0.0 oz    Drug use: No      Medications:    Current Outpatient Medications:  Rosuvastatin Calcium 10 MG Fundus Exam       Right Left    Disc Sloping margin, Temporal crescent Sloping margin, Temporal crescent    C/D Ratio 0.8 0.8    Macula RPE hypopigmentation  difficult view; undilated             Refraction     Wearing Rx     Type:  forgot-Reading only

## (undated) NOTE — LETTER
2023      Attn: Marcos Chavez  :  1934     Dr. Harrell He would like to perform a procedure on this patient. Patient's PCP is requesting clearance for patient to hold blood thinner. Patient is currently scheduled for 2023. []  Patient has been cleared to hold Xarelto for 2 days prior to Bilateral L3-4, L4-5 and L5-S1 facet injections . Holding the medication(s) may put the patient at an increased risk for stroke, heart attack, or neurologic or cardiac events. []  Patient has not been cleared to hold Xarelto for procedure. Please make a selection and fax back. If this office may be of further assistance, please do not hesitate to contact us.         Sincerely,     Gayla Tovar MD    P: [phone]  N:[phone]

## (undated) NOTE — LETTER
[facility], Argenis Carranza, [facility]   Date:   3/17/2023     Name:   Manpreet Gilman    YOB: 1934   MRN:   [id number]       [facility]? Myron the areas on your body where you feel the described sensations. Use the appropriate symbol. Jose Alfredo Bruno the areas of radiation. Include all affected areas. Just to complete the picture, please draw in the face. ACHE:  ^ ^ ^   NUMBNESS:  0000   PINS & NEEDLES:  = = = =                              ^ ^ ^                       0000              = = = =                                    ^ ^ ^                       0000            = = = =      BURNING:  XXXX   STABBING: ////                  XXXX                ////                         XXXX          ////     Please myron the line below indicating your degree of pain right now  with 0 being no pain 10 being the worst pain possible.                                          0             1             2              3             4              5              6              7             8             9             10         Patient Signature:

## (undated) NOTE — LETTER
January 4, 2024     Linda Cleveland  [address] [place] [address] [address] [place] [address]      Dear Linda:    Below are the results from your recent visit:      video swallow  normal     Resulted Orders   XR VIDEO SWALLOW (CPT=74230)    Narrative    PROCEDURE: XR VIDEO SWALLOW (CPT=74230)     COMPARISON: None.     INDICATIONS: Dysphagia.     TECHNIQUE: A swallowing evaluation was performed in the Radiology Department in conjunction with the Department of Speech and Hearing.  A separate detailed report will be issued by the speech pathologist who recorded the study. Fluoroscopy was provided   by a radiologist who was present during the procedure.        Materials of various consistencies were given by mouth, and swallowing was evaluated fluoroscopically.       # of FLUOROGRAPHIC CINE FILES:  8   FLUOROSCOPY IMAGES OBTAINED:  0  FLUOROSCOPY TIME:  2.3 minute  RADIATION DOSE (Dose Area Product):  1161.0-uGy*m^2     FINDINGS:       ASPIRATION/PENETRATION:   None.    STRUCTURE: Normal.  No visible obstruction, stricture, or dilatation.    OTHER: Negative.                  Impression    CONCLUSION:   1. No evidence of laryngeal penetration or aspiration.  2. A full report will follow by the speech pathologist.           Dictated by (CST): Brodie Garner MD on 1/03/2024 at 4:22 PM       Finalized by (CST): Brodie Garner MD on 1/03/2024 at 4:23 PM               If you have any questions or concerns, please don't hesitate to call.    Dr Natalie Goetz

## (undated) NOTE — ED AVS SNAPSHOT
Everardo Maher   MRN: [id number]    Department:  [facility] Emergency Department   Date of Visit:  1/20/2019           Disclosure     Insurance plans vary and the physician(s) referred by the ER may not be covered by your plan.  Please contact within the next three months to obtain basic health screening including reassessment of your blood pressure.     IF THERE IS ANY CHANGE OR WORSENING OF YOUR CONDITION, CALL YOUR PRIMARY CARE PHYSICIAN AT ONCE OR RETURN IMMEDIATELY TO THE EMERGENCY DEPARTMEN

## (undated) NOTE — LETTER
12/05/19        Linda Cleveland  [address]      Dear Alisa Mena,    [address] [facility] records indicate that you have outstanding lab work and or testing that was ordered for you and has not yet been completed:  Orders Placed This Encounter

## (undated) NOTE — LETTER
[facility], [place], [place]   Date:   2/20/2023     Name:   Erma Fisher    YOB: 1934   MRN:   [id number]       [facility]? Virgil the areas on your body where you feel the described sensations. Use the appropriate symbol. Tereasa Coty the areas of radiation. Include all affected areas. Just to complete the picture, please draw in the face. ACHE:  ^ ^ ^   NUMBNESS:  0000   PINS & NEEDLES:  = = = =                              ^ ^ ^                       0000              = = = =                                    ^ ^ ^                       0000            = = = =      BURNING:  XXXX   STABBING: ////                  XXXX                ////                         XXXX          ////     Please virgil the line below indicating your degree of pain right now  with 0 being no pain 10 being the worst pain possible.                                          0             1             2              3             4              5              6              7             8             9             10         Patient Signature:

## (undated) NOTE — LETTER
[address], [place]  Authorization for Invasive Procedures  1.  I hereby authorize Dr. Nettie Burdick , my physician and whomever may be designated as the doctor's assistant, to perform the following operation and/or procedure:  Asha Covington performed for the purposes of advancing medicine, science, and/or education, provided my identity is not revealed. If the procedure has been videotaped, the physician/surgeon will obtain the original videotape.  The hospital will not be responsible for stor My signature below affirms that prior to the time of the procedure, I have explained to the patient and/or her legal representative, the risks and benefits involved in the proposed treatment and any reasonable alternative to the proposed treatment.  I have

## (undated) NOTE — IP AVS SNAPSHOT
[address]  [address] [facility] [phone]                Discharge Summary   3/20/2017    Linda Cleveland           Admission Information        Provider Department    3/20/2017 Andree Platt MD [facility] Lissette De La Cruz XARELTO 20 MG Tabs   Generic drug:  Rivaroxaban        Take 20 mg by mouth daily.       [    ]    [    ]    [    ]    [    ]       Zolpidem Tartrate 5 MG Tabs   Commonly known as:  AMBIEN        Take 0.5 tablets (2.5 mg total) by mouth nightly as needed you to explore options for quitting.     - If you have concerns related to behavioral health issues or thoughts of harming yourself, contact [facility] at [phone].     - If you don’t have insurance, Sydnee Capone Most common side effects: Dizziness, constipation, abnormal liver function   What to report to your healthcare team:  Dizziness, muscle aches, constipation           Blood Thinners (Anticoagulants)     Rivaroxaban (XARELTO) 20 MG Oral Tab       Use: Preven

## (undated) NOTE — MR AVS SNAPSHOT
[facility] [address] [place] [address] [phone]  [phone]               Thank you for choosing us for your health care visit with Rossy Nguyen NP.   We are glad to serve you and happy to provide yo juice, canned vegetables, lunch meats, processed meats like hotdogs, sausage, link, pepperoni, soy sauce, pre-packaged rice or potatoes. Please remember to read nutrition labels for sodium content.      · Exercise daily as tolerated, with goal of doing mod - carvedilol 3.125 MG Tabs  - Rivaroxaban 20 MG Tabs            Results of Recent Testing       MyChart                  Visit [facility] online at  [email or web address]

## (undated) NOTE — LETTER
[address], [facility]   Date:   11/16/2022     Name:   Erma Fisher    YOB: 1934   MRN:   [id number]       [facility]? Virgil the areas on your body where you feel the described sensations. Use the appropriate symbol. Tereasa Coty the areas of radiation. Include all affected areas. Just to complete the picture, please draw in the face. ACHE:  ^ ^ ^   NUMBNESS:  0000   PINS & NEEDLES:  = = = =                              ^ ^ ^                       0000              = = = =                                    ^ ^ ^                       0000            = = = =      BURNING:  XXXX   STABBING: ////                  XXXX                ////                         XXXX          ////     Please virgil the line below indicating your degree of pain right now  with 0 being no pain 10 being the worst pain possible.                                          0             1             2              3             4              5              6              7             8             9             10         Patient Signature:

## (undated) NOTE — MR AVS SNAPSHOT
[facility] SPECIALTY [facility] [place] [address] [address]  [phone]               Thank you for choosing us for your health care visit with Jose Gil MD.  We are glad to serve you and happy to provide you with this summary of amiodarone HCl 200 MG Tabs   Take 400 mg by mouth daily. Commonly known as:  PACERONE           carvedilol 3.125 MG Tabs   Take 1 tablet (3.125 mg total) by mouth 2 (two) times daily with meals.    Commonly known as:  COREG           Rivaroxaban 20 MG

## (undated) NOTE — LETTER
May 1, 2018    Maryjo Leija  [address]      Dear Charley Merchant: The following are the results of your recent tests. Please review the list of test results.   Your result is the value on the left; we have also supplied the range of

## (undated) NOTE — LETTER
Patient Name: Linda Cleveland  YOB: 1934          MRN number:  [id number]  Date:  1/3/2024  Referring Physician: Natalie Goetz       ADULT VIDEOFLUOROSCOPIC SWALLOWING STUDY       ADULT VIDEOFLUOROSCOPIC SWALLOWING STUDY:   Referring Physician: Bishnu      Radiologist: Dr. Garner  Diagnosis: dysphagia    Date of Service: 1/3/2024     PATIENT SUMMARY   Chief Complaint: Pt and caregiver report patient coughs daily with food, liquid and pills.  She denies shortness of breath, odynophagia and weight loss. Pt was accompanied by two caregivers.    Current Diet: regular foods an liquids.       Problem List  Active Problems:  Active Problems:    * No active hospital problems. 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   Food/Liquid Types Presented: puree, solid, nectar thick liquid, thin liquids, and barium tablet.    Study Position and View:  Patient was seated upright and viewed laterally.  A-P was not viewed due to mobility issues and equipment limitations.    Pain Assessment: The patient  reports pain at a level of 0/10.    Oral phase:  Bilabial seal was adequate with no anterior food or liquid loss.  Disorganized lingual movements resulted in mildly increased oral prep and transit times.  Pt made several attempts to propel a barium tablet into the pharynx before successful propulsion.  Trace to mild oral residue remained intermittently with all consistencies, but was cleared with spontaneous second swallow.      Pharyngeal phase:  The pharyngeal response triggered at the tongue base for liquids and solids and at the valleculae for puree with intermittent one second delay.  Adequate base of tongue retraction, hyolaryngeal excursion and epiglottic inversion.  No laryngeal penetration or aspiration was observed with any consistency.  Trace pharyngeal retention remained in the valleculae with puree and solids and throughout the pharynx with liquids.      Esophageal phase:   Adequate flow of bolus through upper esophagus     Penetration Aspiration Scale: 1/8.  Material does not enter the airway.    Overall Impression: Normal swallow.  Recommend general diet with thin liquids.  Of note, patient tended to take large liquid boluses, especially when taking pills.  Recommend small sips and taking pills with puree to reduce risk of aspiration.       FCM category and level: Swallowing, 7  PLAN   Potential: Excellent    Diet Recommendations:  Solids: Regular  Liquids: Thin    Recommended compensatory strategies:   Sit upright  Small sips    Medication Administration:  Take whole in pureed    Further Follow-up:  No follow up warranted with this service.         EDUCATION/INSTRUCTION  Reviewed results and recommendations with patient and caregivers.  Written instructions were provided.  Agreement/Understanding verbalized and all questions answered to their apparent satisfaction.      INTERDISCIPLINARY COMMUNICATION  Reviewed results with Radiologist; agreement verbalized.      Thank you for your referral. Please  co-sign or sign and return this letter via fax as soon as possible. If you have any questions, please contact me at [phone].    Paty Mason MA/MAURIZIO-SLP  Speech Language Pathologist  [facility]  [phone]    Electronically signed by therapist: TABATHA Hassan  Physician's certification required: No

## (undated) NOTE — ED AVS SNAPSHOT
Keyona Dela Cruz   MRN: [id number]    Department:  [facility] Emergency Department   Date of Visit:  9/16/2018           Disclosure     Insurance plans vary and the physician(s) referred by the ER may not be covered by your plan.  Please contact within the next three months to obtain basic health screening including reassessment of your blood pressure.     IF THERE IS ANY CHANGE OR WORSENING OF YOUR CONDITION, CALL YOUR PRIMARY CARE PHYSICIAN AT ONCE OR RETURN IMMEDIATELY TO THE EMERGENCY DEPARTMEN

## (undated) NOTE — MR AVS SNAPSHOT
[facility] [address] [place] [address] 185 [phone]               Thank you for choosing us for your health care visit with Florian Vizcarra NP.   We are glad to serve you and happy to provide yo exercise like walking for about 30 minutes 5 days per week. Start by walking at a slow to moderate pace for 5-10 minutes 2-3 times a day. Pace your activity to prevent shortness of breath or fatigue.  Stop exercise if you develop chest pain, lightheadedness

## (undated) NOTE — LETTER
02/07/19        Patient: Caden Davis   YOB: 1934   Date of Visit: 2/7/2019       Dear  Dr. Benjy Ferris,      Thank you for referring Caden Davis to my practice. Please find my assessment and plan below. IMPRESSIONS:  1.  Atrial

## (undated) NOTE — LETTER
1/16/2019    Patient: Carolyn Lopez   MR Number: [id number]   YOB: 1934   Date of Visit: 1/16/2019   Physician: Ronald Castillo MD     Dear Medicare Patient:  Amara Rodrigez TO BENEFICIARY:  Please know that while a refraction is im

## (undated) NOTE — MR AVS SNAPSHOT
[facility] [address] [place] [address] 185 [phone]               Thank you for choosing us for your health care visit with Mirella Sung NP.   We are glad to serve you and happy to provide yo questions or there are any differences    · Drink about 4 bottles of water per day - about 64 ounces per day     · Heart healthy, decreased refined sugars, and  2000 mg sodium restricted diet and maintain fluids at at least  64 ounces per day.  Common high Component Value Standard Range & Units    Glucose 89 70-99 mg/dL    Sodium 141 136-144 mmol/L    Potassium 4.1 3.3-5.1 mmol/L    Chloride 106  mmol/L    CO2 26 22-32 mmol/L    BUN 9 8-20 mg/dL    Creatinine 0.95 0.50-1.50 mg/dL    Calcium, Total 9. Start activities slowly and build up over time Do what you like   Get your heart pumping – brisk walking, biking, swimming Even 10 minute increments are effective and add up over the week   2 ½ hours per week – spread out over time Use a kevan to keep you

## (undated) NOTE — LETTER
[address] [place] [address]                        [phone]      7/26/2021      Prasanth Scott, [address] [facility]           Patient: Wesley Hodge   YOB: 1934     Leanna Laurent